# Patient Record
Sex: FEMALE | Race: WHITE | NOT HISPANIC OR LATINO | Employment: FULL TIME | URBAN - METROPOLITAN AREA
[De-identification: names, ages, dates, MRNs, and addresses within clinical notes are randomized per-mention and may not be internally consistent; named-entity substitution may affect disease eponyms.]

---

## 2017-01-11 ENCOUNTER — OFFICE VISIT (OUTPATIENT)
Dept: NEUROSURGERY | Facility: CLINIC | Age: 52
End: 2017-01-11

## 2017-01-11 VITALS
SYSTOLIC BLOOD PRESSURE: 102 MMHG | TEMPERATURE: 96.7 F | WEIGHT: 169 LBS | HEIGHT: 64 IN | BODY MASS INDEX: 28.85 KG/M2 | DIASTOLIC BLOOD PRESSURE: 78 MMHG

## 2017-01-11 DIAGNOSIS — M51.26 LUMBAR DISC HERNIATION: Primary | ICD-10-CM

## 2017-01-11 PROCEDURE — 99024 POSTOP FOLLOW-UP VISIT: CPT | Performed by: PHYSICIAN ASSISTANT

## 2017-01-11 NOTE — PROGRESS NOTES
Patient: Roxanna Beck  : 1965  Chart #: [id number]    Date of Service: 2017    CHIEF COMPLAINT: left L3 4 disc herniation as well as recess and foraminal stenosis    History of Present Illness Ms. Beck is a 51-year-old  with a protracted history of back and left lower extremity pain that has waxed and waned but more recently became severe and unremitting.  Her MRI revealed a disc extrusion at L3 4 leftward felt to provide clinical correlation.  As such, on 12/15/2016 she underwent lumbar decompressive surgery and discectomy at this level.  Surgery was without overt intraoperative complication.    Today Ms. Beck is 3 1/2 weeks postop.  She continues to have a bit of pain in her thigh and groin area on the left, specifically with sitting.  During the first 2 postoperative weeks she called our office several times complaining of severe, unrelenting leg pain.  She was given a Medrol Dosepak and her symptoms gradually improved.  She is feeling much better now.  She uses her postoperative narcotic-based pain medication on occasion but mostly is using ibuprofen and Tylenol.  She has been trying to increase her activities a bit and is feeling in general quite tired.    The following portions of the patient's history were reviewed and updated as appropriate: allergies, current medications, past family history, past medical history, past social history, past surgical history and problem list.    Review of Systems   Constitutional: Positive for appetite change, chills and diaphoresis. Negative for activity change, fatigue, fever and unexpected weight change.   HENT: Negative for congestion, dental problem, drooling, ear discharge, ear pain, facial swelling, hearing loss, mouth sores, nosebleeds, postnasal drip, rhinorrhea, sinus pressure, sneezing, sore throat, tinnitus, trouble swallowing and voice change.    Eyes: Negative for photophobia, pain, discharge, redness, itching and visual  "disturbance.   Respiratory: Negative for apnea, cough, choking, chest tightness, shortness of breath, wheezing and stridor.    Cardiovascular: Negative for chest pain, palpitations and leg swelling.   Gastrointestinal: Negative for abdominal distention, abdominal pain, anal bleeding, blood in stool, constipation, diarrhea, nausea, rectal pain and vomiting.   Endocrine: Positive for heat intolerance. Negative for cold intolerance, polydipsia, polyphagia and polyuria.   Genitourinary: Negative for decreased urine volume, difficulty urinating, dysuria, enuresis, flank pain, frequency, genital sores, hematuria and urgency.   Musculoskeletal: Positive for arthralgias, back pain and myalgias. Negative for gait problem, joint swelling, neck pain and neck stiffness.   Skin: Negative for color change, pallor, rash and wound.   Allergic/Immunologic: Positive for environmental allergies. Negative for food allergies and immunocompromised state.   Neurological: Positive for light-headedness. Negative for dizziness, tremors, seizures, syncope, facial asymmetry, speech difficulty, weakness, numbness and headaches.   Hematological: Negative for adenopathy. Does not bruise/bleed easily.   Psychiatric/Behavioral: Negative for agitation, behavioral problems, confusion, decreased concentration, dysphoric mood, hallucinations, self-injury, sleep disturbance and suicidal ideas. The patient is not nervous/anxious and is not hyperactive.        Objective   Vital Signs: Blood pressure 102/78, temperature 96.7 °F (35.9 °C), height 64\" (162.6 cm), weight 169 lb (76.7 kg).  Physical Exam   Constitutional: She appears well-developed and well-nourished. No distress.   Skin:   Lumbar incision has healed nicely.  She has some erythematous patches about the incision consistent with adhesive dermatitis.   Nursing note and vitals reviewed.    Assessment/Plan   Medical Decision Making: Ms. Beck is 3-1/2 weeks status post an uncomplicated " discectomy.  Her first couple weeks were a bit rough but it seems that she's made it over the hump.  Her pain is much better and she is now working on increasing her stamina.  I instructed her to listen to her body and not overdo it. Her place of work has adjusted her workstation so that she may stand or sit.  She will be off a couple more weeks to work on increasing her activities and then may return.  We will check back with her in 6 weeks.               Kya Garcia PA-C  Patient Care Team:  Donato Vilchis DO as PCP - General (Family Medicine)  Donato Vilchis DO as Consulting Physician (Family Medicine)  Jefferson Monk MD as Consulting Physician (Neurosurgery)  GUI Omalley as Referring Physician (Family Medicine)  Donato Vilchis DO as Consulting Physician (Family Medicine)

## 2017-03-17 ENCOUNTER — OFFICE VISIT (OUTPATIENT)
Dept: NEUROSURGERY | Facility: CLINIC | Age: 52
End: 2017-03-17

## 2017-03-17 VITALS — BODY MASS INDEX: 30.22 KG/M2 | WEIGHT: 177 LBS | HEIGHT: 64 IN | TEMPERATURE: 96.3 F

## 2017-03-17 DIAGNOSIS — M48.061 STENOSIS OF LATERAL RECESS OF LUMBAR SPINE: ICD-10-CM

## 2017-03-17 DIAGNOSIS — M51.26 LUMBAR DISC HERNIATION: Primary | ICD-10-CM

## 2017-03-17 PROCEDURE — 99212 OFFICE O/P EST SF 10 MIN: CPT | Performed by: NEUROLOGICAL SURGERY

## 2017-03-17 NOTE — PROGRESS NOTES
Patient: Roxanna Beck  : 1965    Primary Care Provider: Donato Vilchis DO    Requesting Provider: As above        History    Chief Complaint: Left L3-4 disc herniation as well as recess and foraminal stenosis.    History of Present Illness: Ms. Beck is a 51-year-old  who is seen in follow-up.  She's had prior lumbar surgery by another surgeon.  She developed severe back and left lower extremity symptoms that waxed, waned, and then became unremitting.  On 12/15/2016 she underwent lumbar decompression and discectomy on the left at L3-4.  Her initial first 2 weeks were quite rough.  However thereafter her symptoms dissipated and she's had no significant pain whatsoever.  She's been very active.  She has been walking actively and has even lost some weight.    Review of Systems   Constitutional: Positive for fatigue. Negative for activity change, appetite change, chills, diaphoresis, fever and unexpected weight change.   HENT: Positive for rhinorrhea, sinus pressure and sneezing. Negative for congestion, dental problem, drooling, ear discharge, ear pain, facial swelling, hearing loss, mouth sores, nosebleeds, postnasal drip, sore throat, tinnitus, trouble swallowing and voice change.    Eyes: Negative for photophobia, pain, discharge, redness, itching and visual disturbance.   Respiratory: Negative for apnea, cough, choking, chest tightness, shortness of breath, wheezing and stridor.    Cardiovascular: Negative for chest pain, palpitations and leg swelling.   Gastrointestinal: Negative for abdominal distention, abdominal pain, anal bleeding, blood in stool, constipation, diarrhea, nausea, rectal pain and vomiting.   Endocrine: Positive for heat intolerance and polydipsia.   Genitourinary: Positive for urgency.   Musculoskeletal: Positive for arthralgias, myalgias and neck stiffness. Negative for back pain, gait problem, joint swelling and neck pain.   Skin: Negative for color change, pallor,  "rash and wound.   Allergic/Immunologic: Positive for environmental allergies. Negative for food allergies and immunocompromised state.   Neurological: Positive for headaches. Negative for dizziness, tremors, seizures, syncope, facial asymmetry, speech difficulty, weakness, light-headedness and numbness.   Hematological: Negative for adenopathy. Does not bruise/bleed easily.   Psychiatric/Behavioral: Positive for dysphoric mood and sleep disturbance. Negative for agitation, behavioral problems, confusion, decreased concentration, self-injury and suicidal ideas. The patient is nervous/anxious. The patient is not hyperactive.        The patient's past medical history, past surgical history, family history, and social history have been reviewed at length in the electronic medical record.    Physical Exam:   Visit Vitals   • Temp 96.3 °F (35.7 °C)   • Ht 64\" (162.6 cm)   • Wt 177 lb (80.3 kg)   • BMI 30.38 kg/m2     MUSCULOSKELETAL:  Straight leg raising is negative.  Keenan's Sign is negative.  ROM in back normal.  Tenderness in the back to palpation is not observed.  NEUROLOGICAL:  Strength is intact in the lower extremities to direct testing.  Muscle tone is normal throughout.  Station and gait are normal.  Sensation is intact to light touch testing throughout except in the right leg where she has some residual numbness from her first surgery.  Deep tendon reflexes are 1+ and symmetrical.  Coordination is intact.      Medical Decision Making    Diagnosis:   Left L3-4 disc herniation and stenosis status post decompression.    Treatment Options:   Ms. Beck is doing great.  I'm very pleased with her progress.  At this juncture she will follow-up with me on an as-needed basis.       Diagnosis Plan   1. Lumbar disc herniation     2. Stenosis of lateral recess of lumbar spine             I, Dr. Cuenca, personally performed the services described in the documentation, as scribed in my presence, and it is both accurate " and complete.  Scribed for Sandro Cuenca MD by Dean Borges CMA on 03/17/2017 at 9:41 AM

## 2018-07-06 ENCOUNTER — APPOINTMENT (OUTPATIENT)
Dept: GENERAL RADIOLOGY | Facility: HOSPITAL | Age: 53
End: 2018-07-06

## 2018-07-06 ENCOUNTER — HOSPITAL ENCOUNTER (EMERGENCY)
Facility: HOSPITAL | Age: 53
Discharge: HOME | End: 2018-07-06
Attending: EMERGENCY MEDICINE | Admitting: EMERGENCY MEDICINE

## 2018-07-06 VITALS
OXYGEN SATURATION: 97 % | BODY MASS INDEX: 29.19 KG/M2 | WEIGHT: 171 LBS | RESPIRATION RATE: 16 BRPM | TEMPERATURE: 98.2 F | SYSTOLIC BLOOD PRESSURE: 114 MMHG | HEIGHT: 64 IN | DIASTOLIC BLOOD PRESSURE: 95 MMHG | HEART RATE: 87 BPM

## 2018-07-06 DIAGNOSIS — M54.12 CERVICAL RADICULOPATHY: Primary | ICD-10-CM

## 2018-07-06 DIAGNOSIS — M77.8 LEFT SHOULDER TENDONITIS: ICD-10-CM

## 2018-07-06 PROCEDURE — 99283 EMERGENCY DEPT VISIT LOW MDM: CPT

## 2018-07-06 PROCEDURE — 72050 X-RAY EXAM NECK SPINE 4/5VWS: CPT

## 2018-07-06 PROCEDURE — 73030 X-RAY EXAM OF SHOULDER: CPT

## 2018-07-06 RX ORDER — METHYLPREDNISOLONE 4 MG/1
TABLET ORAL
Qty: 21 EACH | Refills: 0 | OUTPATIENT
Start: 2018-07-06 | End: 2021-12-24

## 2018-07-06 RX ORDER — GLIPIZIDE 10 MG/1
10 TABLET ORAL
COMMUNITY

## 2018-07-06 RX ORDER — CEPHALEXIN 500 MG/1
500 CAPSULE ORAL 2 TIMES DAILY
Qty: 14 CAPSULE | Refills: 0 | Status: SHIPPED | OUTPATIENT
Start: 2018-07-06 | End: 2018-07-06

## 2018-07-06 RX ORDER — FUROSEMIDE 20 MG/1
20 TABLET ORAL DAILY
Qty: 7 TABLET | Refills: 0 | Status: SHIPPED | OUTPATIENT
Start: 2018-07-06 | End: 2018-07-06

## 2018-07-06 RX ORDER — METHYLPREDNISOLONE 4 MG/1
TABLET ORAL
Qty: 21 EACH | Refills: 0 | Status: SHIPPED | OUTPATIENT
Start: 2018-07-06 | End: 2018-07-06

## 2018-07-06 RX ORDER — CYCLOBENZAPRINE HCL 5 MG
5 TABLET ORAL 3 TIMES DAILY PRN
Qty: 15 TABLET | Refills: 0 | Status: ON HOLD | OUTPATIENT
Start: 2018-07-06 | End: 2021-12-29

## 2018-07-06 NOTE — ED PROVIDER NOTES
EMERGENCY DEPARTMENT ENCOUNTER    CHIEF COMPLAINT  Chief Complaint: Left shoulder pain  History given by: pt  History limited by: none  Room Number:   PMD: Donato Vilchis DO      HPI:  Pt is a 52 y.o. female who presents complaining of left shoulder pain that started 3 weeks ago that radiates down her left arm. Pt reports shes had bursitis before but has never had this much pain. Pt says the lateral side of her left hand is numb. Pt denies a history of neck problems. Pt reports she has moved recently but didn't lift anything too heavy.     Duration:  3 weeks  Onset: gradual  Timing: constant  Location: left shoulder  Radiation: down left arm  Quality: pain  Intensity/Severity: moderate  Progression: unchanged  Associated Symptoms: numbness in left hand  Aggravating Factors: none  Alleviating Factors: none  Previous Episodes: History of bursitis but nothing this painful  Treatment before arrival: none    PAST MEDICAL HISTORY  Active Ambulatory Problems     Diagnosis Date Noted   • Lumbar disc herniation 12/15/2016     Resolved Ambulatory Problems     Diagnosis Date Noted   • No Resolved Ambulatory Problems     Past Medical History:   Diagnosis Date   • Arthritis    • Depression    • Diabetes mellitus (CMS/HCC)    • Dizzy    • Fibromyalgia    • GERD (gastroesophageal reflux disease)    • History of kidney stones    • HNP (herniated nucleus pulposus)    • Hyperlipidemia    • Hypertension    • Wears glasses        PAST SURGICAL HISTORY  Past Surgical History:   Procedure Laterality Date   • BACK SURGERY      L4-5 Discectomy   • CARPAL TUNNEL RELEASE      left   •  SECTION     • CHOLECYSTECTOMY     • COLONOSCOPY     • ENDOSCOPY     • HYSTERECTOMY     • KIDNEY STONE SURGERY     • LUMBAR DISCECTOMY N/A 12/15/2016    Procedure: LUMBAR DISCECTOMY L3-4;  Surgeon: Sandro Cuenca MD;  [facility];  Service:    • OOPHORECTOMY     • TONSILLECTOMY     • WISDOM TOOTH EXTRACTION         FAMILY  HISTORY  Family History   Problem Relation Age of Onset   • Lung cancer Mother    • Cancer Mother    • Heart attack Father    • Heart disease Father    • Diabetes Sister    • Hypertension Sister    • Diabetes Brother    • Hypertension Brother    • Heart disease Paternal Grandmother    • Heart disease Paternal Grandfather        SOCIAL HISTORY  Social History     Social History   • Marital status:      Spouse name: N/A   • Number of children: 1   • Years of education: High School     Occupational History   • [employer]      Social History Main Topics   • Smoking status: Never Smoker   • Smokeless tobacco: Never Used   • Alcohol use No   • Drug use: No   • Sexual activity: Defer     Other Topics Concern   • Not on file     Social History Narrative   • No narrative on file       ALLERGIES  Adhesive tape; Codeine; Percocet [oxycodone-acetaminophen]; and Toradol [ketorolac tromethamine]    REVIEW OF SYSTEMS  Review of Systems   Constitutional: Negative for fever.   HENT: Negative for sore throat.    Eyes: Negative.    Respiratory: Negative for cough and shortness of breath.    Cardiovascular: Negative for chest pain.   Gastrointestinal: Negative for abdominal pain, diarrhea and vomiting.   Genitourinary: Negative for dysuria.   Musculoskeletal: Positive for arthralgias (Left shoulder). Negative for neck pain.   Skin: Negative for rash.   Allergic/Immunologic: Negative.    Neurological: Positive for numbness (Left lateral hand). Negative for weakness and headaches.   Hematological: Negative.    Psychiatric/Behavioral: Negative.    All other systems reviewed and are negative.      PHYSICAL EXAM  ED Triage Vitals [07/06/18 1925]   Temp Heart Rate Resp BP SpO2   98.2 °F (36.8 °C) 99 18 -- 99 %      Temp src Heart Rate Source Patient Position BP Location FiO2 (%)   Tympanic Monitor -- -- --       Physical Exam   Constitutional: She is oriented to person, place, and time. No distress.   HENT:   Head: Normocephalic and  atraumatic.   Eyes: EOM are normal. Pupils are equal, round, and reactive to light.   Neck: Normal range of motion. Neck supple.   Cardiovascular: Normal rate, regular rhythm and normal heart sounds.    Pulmonary/Chest: Effort normal and breath sounds normal. No respiratory distress.   Abdominal: Soft. There is no tenderness. There is no rebound and no guarding.   Musculoskeletal: Normal range of motion. She exhibits tenderness (Left trapezius and left shoulder). She exhibits no edema (pedal).    Numbness to lateral side of left hand but is neurovascularly intact. ROM of shoulder causes pain.    Neurological: She is alert and oriented to person, place, and time. She has normal sensation and normal strength.   Skin: Skin is warm and dry. No rash noted.   Psychiatric: Mood and affect normal.   Nursing note and vitals reviewed.        RADIOLOGY  XR Spine Cervical Complete 4 or 5 View       XR Shoulder 2+ View Left        Show some DJD of C spine and some calcification on left humerous that could be consistent with tendonitis          I ordered the above noted radiological studies. Interpreted by radiologist. Discussed with radiologist (Dr. Miranda). Reviewed by me in PACS.       PROCEDURES  Procedures      PROGRESS AND CONSULTS     1936  Ordered Spine XR and Shoulder XR for further evaluation.     2016  Rechecked patient who is resting comfortably. Discussed all lab and test results. Discussed plan to discharge with muscle relaxers, steroids, and options to see pain management or an orthopedic doctor. Pt understands and agrees with the plan. All questions have been answered.        MEDICAL DECISION MAKING  Results were reviewed/discussed with the patient and they were also made aware of online access. Pt also made aware that some labs, such as cultures, will not be resulted during ER visit and follow up with PMD is necessary.     MDM  Number of Diagnoses or Management Options     Amount and/or Complexity of Data  Reviewed  Tests in the radiology section of CPT®: ordered and reviewed (Cervical and Shoulder XR - show some DJD of C spine and some calcification on left humerous that could be consistent with tendonitis)  Discussion of test results with the performing providers: yes (Dr. Miranda)           DIAGNOSIS  Final diagnoses:   Cervical radiculopathy   Left shoulder tendonitis       DISPOSITION  DISCHARGE    Patient discharged in stable condition.    Reviewed implications of results, diagnosis, meds, responsibility to follow up, warning signs and symptoms of possible worsening, potential complications and reasons to return to ER.    Patient/Family voiced understanding of above instructions.    Discussed plan for discharge, as there is no emergent indication for admission. Patient referred to primary care provider for BP management due to today's BP. Pt/family is agreeable and understands need for follow up and repeat testing.  Pt is aware that discharge does not mean that nothing is wrong but it indicates no emergency is present that requires admission and they must continue care with follow-up as given below or physician of their choice.     FOLLOW-UP  Donato Vilchis, DO  [address] [address]  [phone]    In 1 week  For recheck         Medication List      New Prescriptions    cyclobenzaprine 5 MG tablet  Commonly known as:  FLEXERIL  Take 1 tablet by mouth 3 (Three) Times a Day As Needed for Muscle Spasms.     MethylPREDNISolone 4 MG tablet  Commonly known as:  MEDROL (JOHN)  Take as directed on package instructions.              Latest Documented Vital Signs:  As of 8:29 PM  BP- 132/83 HR- 89 Temp- 98.2 °F (36.8 °C) (Tympanic) O2 sat- 98%    --  Documentation assistance provided by florence Badillo for Dr. Givens.  Information recorded by the scribe was done at my direction and has been verified and validated by me.     Lynda Badillo  07/06/18 2021       Heraclio Givens,  MD  07/06/18 2029

## 2018-07-06 NOTE — ED TRIAGE NOTES
Pt reports she has left shoulder and arm pain that has been going on for a few weeks.  Pt states when she moves her arm the pain increases.  Pt has tried motrin and a muscle rub but reports the pain has increased. Pt also reports chest pain.

## 2018-11-08 ENCOUNTER — TRANSCRIBE ORDERS (OUTPATIENT)
Dept: ADMINISTRATIVE | Facility: HOSPITAL | Age: 53
End: 2018-11-08

## 2018-11-12 ENCOUNTER — TRANSCRIBE ORDERS (OUTPATIENT)
Dept: ADMINISTRATIVE | Facility: HOSPITAL | Age: 53
End: 2018-11-12

## 2018-11-12 DIAGNOSIS — N63.23 BREAST LUMP ON LEFT SIDE AT 5 O'CLOCK POSITION: Primary | ICD-10-CM

## 2018-11-28 ENCOUNTER — HOSPITAL ENCOUNTER (OUTPATIENT)
Dept: MAMMOGRAPHY | Facility: HOSPITAL | Age: 53
Discharge: HOME | End: 2018-11-28
Admitting: PHYSICIAN ASSISTANT

## 2018-11-28 ENCOUNTER — HOSPITAL ENCOUNTER (OUTPATIENT)
Dept: ULTRASOUND IMAGING | Facility: HOSPITAL | Age: 53
Discharge: HOME | End: 2018-11-28

## 2018-11-28 DIAGNOSIS — N63.23 BREAST LUMP ON LEFT SIDE AT 5 O'CLOCK POSITION: ICD-10-CM

## 2018-11-28 PROCEDURE — 76642 ULTRASOUND BREAST LIMITED: CPT | Performed by: RADIOLOGY

## 2018-11-28 PROCEDURE — 77062 BREAST TOMOSYNTHESIS BI: CPT | Performed by: RADIOLOGY

## 2018-11-28 PROCEDURE — 77066 DX MAMMO INCL CAD BI: CPT | Performed by: RADIOLOGY

## 2018-11-28 PROCEDURE — 76642 ULTRASOUND BREAST LIMITED: CPT

## 2018-11-28 PROCEDURE — G0279 TOMOSYNTHESIS, MAMMO: HCPCS

## 2018-11-28 PROCEDURE — 77066 DX MAMMO INCL CAD BI: CPT

## 2020-01-01 NOTE — MR AVS SNAPSHOT
Roxanna Beck   1/11/2017 11:00 AM   Office Visit    Dept Phone:  [phone]   Encounter #:  [id number]    Provider:  Kya Garcia PA-C   Department:  [facility]                Your Full Care Plan              Today's Medication Changes          These changes are accurate as of: 1/11/17 11:33 AM.  If you have any questions, ask your nurse or doctor.               Medication(s)that have changed:     * HYDROcodone-acetaminophen  MG per tablet   Commonly known as:  NORCO   Take 1 tablet by mouth 3 (Three) Times a Day As Needed for moderate pain (4-6).   What changed:  additional instructions   Changed by:  Sandro Cuenca MD       * HYDROcodone-acetaminophen  MG per tablet   Commonly known as:  NORCO   Take 1 tablet by mouth 3 (Three) Times a Day As Needed for moderate pain (4-6).   What changed:  Another medication with the same name was changed. Make sure you understand how and when to take each.   Changed by:  Dean Borges       * Notice:  This list has 2 medication(s) that are the same as other medications prescribed for you. Read the directions carefully, and ask your doctor or other care provider to review them with you.               Your Updated Medication List          This list is accurate as of: 1/11/17 11:33 AM.  Always use your most recent med list.                amitriptyline 25 MG tablet   Commonly known as:  ELAVIL       cetirizine 10 MG tablet   Commonly known as:  zyrTEC       DULoxetine 30 MG capsule   Commonly known as:  CYMBALTA       gabapentin 600 MG tablet   Commonly known as:  NEURONTIN       * HYDROcodone-acetaminophen  MG per tablet   Commonly known as:  NORCO   Take 1 tablet by mouth 3 (Three) Times a Day As Needed for moderate pain (4-6).       * HYDROcodone-acetaminophen  MG per tablet   Commonly known as:  NORCO   Take 1 tablet by mouth 3 (Three) Times a Day As Needed for moderate pain  (4-6).       insulin NPH-insulin regular (70-30) 100 UNIT/ML injection   Commonly known as:  novoLIN 70/30       INVOKANA PO       lisinopril 10 MG tablet   Commonly known as:  PRINIVIL,ZESTRIL       METFORMIN HCL PO       omeprazole 40 MG capsule   Commonly known as:  priLOSEC       rosuvastatin 10 MG tablet   Commonly known as:  CRESTOR       * Notice:  This list has 2 medication(s) that are the same as other medications prescribed for you. Read the directions carefully, and ask your doctor or other care provider to review them with you.            You Were Diagnosed With        Codes Comments    Lumbar disc herniation    -  Primary ICD-10-CM: M51.26  ICD-9-CM: 722.10       Instructions     None    Patient Instructions History      Upcoming Appointments     Visit Type Date Time Department    POST-OP 2017 11:00 AM [facility]    OFFICE VISIT 2017  1:45 PM [facility]      LineHop Signup     [facility] [employer] allows you to send messages to your doctor, view your test results, renew your prescriptions, schedule appointments, and more. To sign up, go to [employer] and click on the Sign Up Now link in the New User? box. Enter your LineHop Activation Code exactly as it appears below along with the last four digits of your Social Security Number and your Date of Birth () to complete the sign-up process. If you do not sign up before the expiration date, you must request a new code.    LineHop Activation Code: [email or web address]  Expires: 2017 11:33 AM    If you have questions, you can email [email or web address] or call [phone] to talk to our Line[employer] staff. Remember, LineHop is NOT to be used for urgent needs. For medical emergencies, dial 911.               Other Info from Your Visit           Your Appointments     2017  1:45 PM EST   (Arrive by 1:30 PM EST)   Office Visit with Sandro Cuenca MD   [facility] (--)    [address],  [facility] [address]  [facility] [address] [phone]           Arrive 15 minutes prior to appointment.              Allergies     Adhesive Tape      Codeine Intolerance Nausea And Vomiting    Percocet [Oxycodone-acetaminophen] Intolerance Nausea And Vomiting    Caused bad pain    Toradol [Ketorolac Tromethamine] Intolerance Other (See Comments)    \"doesnt do anything\"       Reason for Visit     Post-op           Vital Signs     Blood Pressure Temperature Height Weight Body Mass Index Smoking Status    102/78 96.7 °F (35.9 °C) 64\" (162.6 cm) 169 lb (76.7 kg) 29.01 kg/m2 Never Smoker      Problems and Diagnoses Noted     Lumbar disc herniation        " 0 -5.05

## 2020-07-16 ENCOUNTER — TELEPHONE (OUTPATIENT)
Dept: URGENT CARE | Facility: CLINIC | Age: 55
End: 2020-07-16

## 2021-12-24 ENCOUNTER — APPOINTMENT (OUTPATIENT)
Dept: GENERAL RADIOLOGY | Facility: HOSPITAL | Age: 56
End: 2021-12-24

## 2021-12-24 ENCOUNTER — HOSPITAL ENCOUNTER (EMERGENCY)
Facility: HOSPITAL | Age: 56
Discharge: HOME | End: 2021-12-24
Attending: EMERGENCY MEDICINE | Admitting: EMERGENCY MEDICINE

## 2021-12-24 VITALS
DIASTOLIC BLOOD PRESSURE: 92 MMHG | HEART RATE: 91 BPM | OXYGEN SATURATION: 98 % | SYSTOLIC BLOOD PRESSURE: 163 MMHG | TEMPERATURE: 97.2 F | RESPIRATION RATE: 18 BRPM

## 2021-12-24 DIAGNOSIS — E86.0 DEHYDRATION: ICD-10-CM

## 2021-12-24 DIAGNOSIS — B34.8 RHINOVIRUS INFECTION: Primary | ICD-10-CM

## 2021-12-24 DIAGNOSIS — R53.83 EXHAUSTION: ICD-10-CM

## 2021-12-24 DIAGNOSIS — G44.89 OTHER HEADACHE SYNDROME: ICD-10-CM

## 2021-12-24 LAB
ALBUMIN SERPL-MCNC: 4.5 G/DL (ref 3.5–5.2)
ALBUMIN/GLOB SERPL: 1.3 G/DL
ALP SERPL-CCNC: 141 U/L (ref 39–117)
ALT SERPL W P-5'-P-CCNC: 54 U/L (ref 1–33)
ANION GAP SERPL CALCULATED.3IONS-SCNC: 15.2 MMOL/L (ref 5–15)
AST SERPL-CCNC: 46 U/L (ref 1–32)
B PARAPERT DNA SPEC QL NAA+PROBE: NOT DETECTED
B PERT DNA SPEC QL NAA+PROBE: NOT DETECTED
BACTERIA UR QL AUTO: ABNORMAL /HPF
BASOPHILS # BLD AUTO: 0.03 10*3/MM3 (ref 0–0.2)
BASOPHILS NFR BLD AUTO: 0.4 % (ref 0–1.5)
BILIRUB SERPL-MCNC: 0.3 MG/DL (ref 0–1.2)
BILIRUB UR QL STRIP: NEGATIVE
BUN SERPL-MCNC: 21 MG/DL (ref 6–20)
BUN/CREAT SERPL: 23.6 (ref 7–25)
C PNEUM DNA NPH QL NAA+NON-PROBE: NOT DETECTED
CALCIUM SPEC-SCNC: 9.9 MG/DL (ref 8.6–10.5)
CHLORIDE SERPL-SCNC: 99 MMOL/L (ref 98–107)
CLARITY UR: CLEAR
CO2 SERPL-SCNC: 20.8 MMOL/L (ref 22–29)
COLOR UR: YELLOW
CREAT SERPL-MCNC: 0.89 MG/DL (ref 0.57–1)
CRP SERPL-MCNC: 0.48 MG/DL (ref 0–0.5)
D-LACTATE SERPL-SCNC: 1.7 MMOL/L (ref 0.5–2)
D-LACTATE SERPL-SCNC: 2.6 MMOL/L (ref 0.5–2)
DEPRECATED RDW RBC AUTO: 39.9 FL (ref 37–54)
EOSINOPHIL # BLD AUTO: 0.09 10*3/MM3 (ref 0–0.4)
EOSINOPHIL NFR BLD AUTO: 1.1 % (ref 0.3–6.2)
ERYTHROCYTE [DISTWIDTH] IN BLOOD BY AUTOMATED COUNT: 12.2 % (ref 12.3–15.4)
FLUAV SUBTYP SPEC NAA+PROBE: NOT DETECTED
FLUBV RNA ISLT QL NAA+PROBE: NOT DETECTED
GFR SERPL CREATININE-BSD FRML MDRD: 65 ML/MIN/1.73
GLOBULIN UR ELPH-MCNC: 3.6 GM/DL
GLUCOSE SERPL-MCNC: 213 MG/DL (ref 65–99)
GLUCOSE UR STRIP-MCNC: ABNORMAL MG/DL
HADV DNA SPEC NAA+PROBE: NOT DETECTED
HCOV 229E RNA SPEC QL NAA+PROBE: NOT DETECTED
HCOV HKU1 RNA SPEC QL NAA+PROBE: NOT DETECTED
HCOV NL63 RNA SPEC QL NAA+PROBE: NOT DETECTED
HCOV OC43 RNA SPEC QL NAA+PROBE: NOT DETECTED
HCT VFR BLD AUTO: 43.9 % (ref 34–46.6)
HGB BLD-MCNC: 15 G/DL (ref 12–15.9)
HGB UR QL STRIP.AUTO: NEGATIVE
HMPV RNA NPH QL NAA+NON-PROBE: NOT DETECTED
HPIV1 RNA ISLT QL NAA+PROBE: NOT DETECTED
HPIV2 RNA SPEC QL NAA+PROBE: NOT DETECTED
HPIV3 RNA NPH QL NAA+PROBE: NOT DETECTED
HPIV4 P GENE NPH QL NAA+PROBE: NOT DETECTED
HYALINE CASTS UR QL AUTO: ABNORMAL /LPF
IMM GRANULOCYTES # BLD AUTO: 0.04 10*3/MM3 (ref 0–0.05)
IMM GRANULOCYTES NFR BLD AUTO: 0.5 % (ref 0–0.5)
KETONES UR QL STRIP: ABNORMAL
LEUKOCYTE ESTERASE UR QL STRIP.AUTO: ABNORMAL
LYMPHOCYTES # BLD AUTO: 2.76 10*3/MM3 (ref 0.7–3.1)
LYMPHOCYTES NFR BLD AUTO: 35 % (ref 19.6–45.3)
M PNEUMO IGG SER IA-ACNC: NOT DETECTED
MCH RBC QN AUTO: 31 PG (ref 26.6–33)
MCHC RBC AUTO-ENTMCNC: 34.2 G/DL (ref 31.5–35.7)
MCV RBC AUTO: 90.7 FL (ref 79–97)
MONOCYTES # BLD AUTO: 0.7 10*3/MM3 (ref 0.1–0.9)
MONOCYTES NFR BLD AUTO: 8.9 % (ref 5–12)
NEUTROPHILS NFR BLD AUTO: 4.26 10*3/MM3 (ref 1.7–7)
NEUTROPHILS NFR BLD AUTO: 54.1 % (ref 42.7–76)
NITRITE UR QL STRIP: NEGATIVE
NRBC BLD AUTO-RTO: 0 /100 WBC (ref 0–0.2)
NT-PROBNP SERPL-MCNC: 31.8 PG/ML (ref 0–900)
PH UR STRIP.AUTO: <=5 [PH] (ref 5–8)
PLATELET # BLD AUTO: 279 10*3/MM3 (ref 140–450)
PMV BLD AUTO: 9.6 FL (ref 6–12)
POTASSIUM SERPL-SCNC: 5.4 MMOL/L (ref 3.5–5.2)
PROCALCITONIN SERPL-MCNC: 0.1 NG/ML (ref 0–0.25)
PROT SERPL-MCNC: 8.1 G/DL (ref 6–8.5)
PROT UR QL STRIP: ABNORMAL
RBC # BLD AUTO: 4.84 10*6/MM3 (ref 3.77–5.28)
RBC # UR STRIP: ABNORMAL /HPF
REF LAB TEST METHOD: ABNORMAL
RHINOVIRUS RNA SPEC NAA+PROBE: DETECTED
RSV RNA NPH QL NAA+NON-PROBE: NOT DETECTED
SARS-COV-2 RNA NPH QL NAA+NON-PROBE: NOT DETECTED
SODIUM SERPL-SCNC: 135 MMOL/L (ref 136–145)
SP GR UR STRIP: 1.02 (ref 1–1.03)
SQUAMOUS #/AREA URNS HPF: ABNORMAL /HPF
TROPONIN T SERPL-MCNC: <0.01 NG/ML (ref 0–0.03)
UROBILINOGEN UR QL STRIP: ABNORMAL
WBC # UR STRIP: ABNORMAL /HPF
WBC NRBC COR # BLD: 7.88 10*3/MM3 (ref 3.4–10.8)

## 2021-12-24 PROCEDURE — 84145 PROCALCITONIN (PCT): CPT | Performed by: EMERGENCY MEDICINE

## 2021-12-24 PROCEDURE — 93005 ELECTROCARDIOGRAM TRACING: CPT | Performed by: EMERGENCY MEDICINE

## 2021-12-24 PROCEDURE — 80053 COMPREHEN METABOLIC PANEL: CPT | Performed by: EMERGENCY MEDICINE

## 2021-12-24 PROCEDURE — 87040 BLOOD CULTURE FOR BACTERIA: CPT | Performed by: EMERGENCY MEDICINE

## 2021-12-24 PROCEDURE — 86140 C-REACTIVE PROTEIN: CPT | Performed by: EMERGENCY MEDICINE

## 2021-12-24 PROCEDURE — 96375 TX/PRO/DX INJ NEW DRUG ADDON: CPT

## 2021-12-24 PROCEDURE — 83605 ASSAY OF LACTIC ACID: CPT | Performed by: EMERGENCY MEDICINE

## 2021-12-24 PROCEDURE — 71045 X-RAY EXAM CHEST 1 VIEW: CPT

## 2021-12-24 PROCEDURE — 36415 COLL VENOUS BLD VENIPUNCTURE: CPT

## 2021-12-24 PROCEDURE — 85025 COMPLETE CBC W/AUTO DIFF WBC: CPT | Performed by: EMERGENCY MEDICINE

## 2021-12-24 PROCEDURE — 83880 ASSAY OF NATRIURETIC PEPTIDE: CPT | Performed by: EMERGENCY MEDICINE

## 2021-12-24 PROCEDURE — 25010000002 DIPHENHYDRAMINE PER 50 MG: Performed by: EMERGENCY MEDICINE

## 2021-12-24 PROCEDURE — 0202U NFCT DS 22 TRGT SARS-COV-2: CPT | Performed by: EMERGENCY MEDICINE

## 2021-12-24 PROCEDURE — 93010 ELECTROCARDIOGRAM REPORT: CPT | Performed by: INTERNAL MEDICINE

## 2021-12-24 PROCEDURE — 84484 ASSAY OF TROPONIN QUANT: CPT | Performed by: EMERGENCY MEDICINE

## 2021-12-24 PROCEDURE — 25010000002 PROCHLORPERAZINE 10 MG/2ML SOLUTION: Performed by: EMERGENCY MEDICINE

## 2021-12-24 PROCEDURE — 96374 THER/PROPH/DIAG INJ IV PUSH: CPT

## 2021-12-24 PROCEDURE — 81001 URINALYSIS AUTO W/SCOPE: CPT | Performed by: EMERGENCY MEDICINE

## 2021-12-24 PROCEDURE — 99284 EMERGENCY DEPT VISIT MOD MDM: CPT

## 2021-12-24 RX ORDER — SODIUM CHLORIDE 0.9 % (FLUSH) 0.9 %
10 SYRINGE (ML) INJECTION AS NEEDED
Status: DISCONTINUED | OUTPATIENT
Start: 2021-12-24 | End: 2021-12-24 | Stop reason: HOSPADM

## 2021-12-24 RX ORDER — METHYLPREDNISOLONE 4 MG/1
TABLET ORAL
Qty: 21 EACH | Refills: 0 | Status: SHIPPED | OUTPATIENT
Start: 2021-12-24 | End: 2021-12-24 | Stop reason: SDUPTHER

## 2021-12-24 RX ORDER — IBUPROFEN 600 MG/1
600 TABLET ORAL EVERY 8 HOURS PRN
Qty: 21 TABLET | Refills: 0 | Status: SHIPPED | OUTPATIENT
Start: 2021-12-24 | End: 2021-12-24 | Stop reason: SDUPTHER

## 2021-12-24 RX ORDER — PROCHLORPERAZINE EDISYLATE 5 MG/ML
5 INJECTION INTRAMUSCULAR; INTRAVENOUS ONCE
Status: COMPLETED | OUTPATIENT
Start: 2021-12-24 | End: 2021-12-24

## 2021-12-24 RX ORDER — ACETAMINOPHEN 500 MG
1000 TABLET ORAL ONCE
Status: COMPLETED | OUTPATIENT
Start: 2021-12-24 | End: 2021-12-24

## 2021-12-24 RX ORDER — IBUPROFEN 600 MG/1
600 TABLET ORAL EVERY 8 HOURS PRN
Qty: 21 TABLET | Refills: 0 | Status: SHIPPED | OUTPATIENT
Start: 2021-12-24

## 2021-12-24 RX ORDER — DIPHENHYDRAMINE HYDROCHLORIDE 50 MG/ML
25 INJECTION INTRAMUSCULAR; INTRAVENOUS ONCE
Status: COMPLETED | OUTPATIENT
Start: 2021-12-24 | End: 2021-12-24

## 2021-12-24 RX ORDER — METHYLPREDNISOLONE 4 MG/1
TABLET ORAL
Qty: 21 EACH | Refills: 0 | Status: SHIPPED | OUTPATIENT
Start: 2021-12-24

## 2021-12-24 RX ADMIN — ACETAMINOPHEN 1000 MG: 500 TABLET ORAL at 15:54

## 2021-12-24 RX ADMIN — DIPHENHYDRAMINE HYDROCHLORIDE 25 MG: 50 INJECTION, SOLUTION INTRAMUSCULAR; INTRAVENOUS at 15:55

## 2021-12-24 RX ADMIN — SODIUM CHLORIDE, POTASSIUM CHLORIDE, SODIUM LACTATE AND CALCIUM CHLORIDE 1000 ML: 600; 310; 30; 20 INJECTION, SOLUTION INTRAVENOUS at 15:37

## 2021-12-24 RX ADMIN — PROCHLORPERAZINE EDISYLATE 5 MG: 5 INJECTION INTRAMUSCULAR; INTRAVENOUS at 15:55

## 2021-12-24 NOTE — ED PROVIDER NOTES
EMERGENCY DEPARTMENT ENCOUNTER  Patient was placed in face mask in first look and the following protective measures were taken unless  documented below in the ED course. Patient was wearing facemask when I entered the room and throughout our encounter. I wore full protective equipment throughout this patient encounter including a N95 mask, eye shield, gown and gloves. Hand hygiene was performed before donning protective equipment and after removal when leaving the room.    Room Number:  30/30  Date of encounter:  12/24/2021  PCP: Donato Vilchis DO    HPI:  Context: Roxanna Beck is a 56 y.o. female who presents to the ED c/o chief complaint of several complaints.  Patient complains of sinus congestion for approximately 2 weeks.  Over the past several days, she has noted thick yellow discharge.  He works at a local  and she has worked 12 days in a row.  One of her coworkers was not feeling well for several days and she found out that her coworker tested positive for SARS-CoV-2 3 days ago.  Patient denies fever, chills or loss of sense of smell or taste.  However she complains of a posterior headache, decreased appetite and a dry cough.  She has noted lightheadedness when she stands up and walks.  She is also had dyspnea on exertion.  She states that she has been falling more when she walks over the past several days.  She is also noted diarrhea yesterday.  Is wondering if she has a sinus infection or another type of infection that is the cause of her symptoms.  Patient has been immunized against SARS-CoV-2 but has not had the booster.  Patient is a diabetic and she states her blood sugar this morning was 192.    MEDICAL HISTORY REVIEW  Reviewed in EPIC    PAST MEDICAL HISTORY  Active Ambulatory Problems     Diagnosis Date Noted   • Lumbar disc herniation 12/15/2016     Resolved Ambulatory Problems     Diagnosis Date Noted   • No Resolved Ambulatory Problems     Past Medical History:   Diagnosis Date    • Arthritis    • Depression    • Diabetes mellitus (CMS/HCC)    • Dizzy    • Fibromyalgia    • GERD (gastroesophageal reflux disease)    • History of kidney stones    • HNP (herniated nucleus pulposus)    • Hyperlipidemia    • Hypertension    • Wears glasses        PAST SURGICAL HISTORY  Past Surgical History:   Procedure Laterality Date   • BACK SURGERY      L4-5 Discectomy   • CARPAL TUNNEL RELEASE      left   •  SECTION     • CHOLECYSTECTOMY     • COLONOSCOPY     • ENDOSCOPY     • HYSTERECTOMY     • KIDNEY STONE SURGERY     • LUMBAR DISCECTOMY N/A 12/15/2016    Procedure: LUMBAR DISCECTOMY L3-4;  Surgeon: Sandro Cuenca MD;  Location: [facility];  Service:    • OOPHORECTOMY     • TONSILLECTOMY     • WISDOM TOOTH EXTRACTION         FAMILY HISTORY  Family History   Problem Relation Age of Onset   • Lung cancer Mother    • Cancer Mother    • Heart attack Father    • Heart disease Father    • Diabetes Sister    • Hypertension Sister    • Breast cancer Sister 70   • Diabetes Brother    • Hypertension Brother    • Heart disease Paternal Grandmother    • Heart disease Paternal Grandfather    • Breast cancer Cousin         early 50's   • Breast cancer Maternal Aunt         50's   • Breast cancer Maternal Aunt         50's   • Breast cancer Maternal Aunt         50's   • Breast cancer Maternal Aunt         50's   • Breast cancer Maternal Aunt         50's   • Endometrial cancer Neg Hx    • Ovarian cancer Neg Hx        SOCIAL HISTORY  Social History     Socioeconomic History   • Marital status:    • Number of children: 1   • Years of education: High School   Tobacco Use   • Smoking status: Never Smoker   • Smokeless tobacco: Never Used   Substance and Sexual Activity   • Alcohol use: No   • Drug use: No   • Sexual activity: Defer       ALLERGIES  Adhesive tape, Codeine, Percocet [oxycodone-acetaminophen], and Toradol [ketorolac tromethamine]    The patient's allergies have been reviewed    REVIEW OF  SYSTEMS  All systems reviewed and negative except for those discussed in HPI.     PHYSICAL EXAM  I have reviewed the triage vital signs and nursing notes.  ED Triage Vitals   Temp Heart Rate Resp BP SpO2   12/24/21 1249 12/24/21 1249 12/24/21 1249 12/24/21 1431 12/24/21 1249   97.2 °F (36.2 °C) 109 18 164/96 98 %      Temp src Heart Rate Source Patient Position BP Location FiO2 (%)   -- -- -- -- --                General: Mild distress  HENT: NCAT, PERRL, Nares patent.  Sinuses are nontender to palpation.  Her tympanic membranes are normal in appearance.  Eyes: no scleral icterus.  Extraocular movements are intact.  Neck: trachea midline, no ROM limitations.  No lymphadenopathy or stiff neck.  She does not have meningismus.  CV: regular rhythm, regular rate.  Respiratory: normal effort, CTAB.  Abdomen: soft, nondistended, NTTP, no rebound tenderness, no guarding or rigidity.  : deferred.  Musculoskeletal: no deformity.  No edema or calf tenderness.  Neuro: alert, moves all extremities, follows commands.  Skin: warm, dry.    LAB RESULTS  Recent Results (from the past 24 hour(s))   Comprehensive Metabolic Panel    Collection Time: 12/24/21  2:10 PM    Specimen: Blood   Result Value Ref Range    Glucose 213 (H) 65 - 99 mg/dL    BUN 21 (H) 6 - 20 mg/dL    Creatinine 0.89 0.57 - 1.00 mg/dL    Sodium 135 (L) 136 - 145 mmol/L    Potassium 5.4 (H) 3.5 - 5.2 mmol/L    Chloride 99 98 - 107 mmol/L    CO2 20.8 (L) 22.0 - 29.0 mmol/L    Calcium 9.9 8.6 - 10.5 mg/dL    Total Protein 8.1 6.0 - 8.5 g/dL    Albumin 4.50 3.50 - 5.20 g/dL    ALT (SGPT) 54 (H) 1 - 33 U/L    AST (SGOT) 46 (H) 1 - 32 U/L    Alkaline Phosphatase 141 (H) 39 - 117 U/L    Total Bilirubin 0.3 0.0 - 1.2 mg/dL    eGFR Non African Amer 65 >60 mL/min/1.73    Globulin 3.6 gm/dL    A/G Ratio 1.3 g/dL    BUN/Creatinine Ratio 23.6 7.0 - 25.0    Anion Gap 15.2 (H) 5.0 - 15.0 mmol/L   Procalcitonin    Collection Time: 12/24/21  2:10 PM    Specimen: Blood   Result  Value Ref Range    Procalcitonin 0.10 0.00 - 0.25 ng/mL   C-reactive Protein    Collection Time: 12/24/21  2:10 PM    Specimen: Blood   Result Value Ref Range    C-Reactive Protein 0.48 0.00 - 0.50 mg/dL   Lactic Acid, Plasma    Collection Time: 12/24/21  2:10 PM    Specimen: Blood   Result Value Ref Range    Lactate 2.6 (C) 0.5 - 2.0 mmol/L   Troponin    Collection Time: 12/24/21  2:10 PM    Specimen: Blood   Result Value Ref Range    Troponin T <0.010 0.000 - 0.030 ng/mL   BNP    Collection Time: 12/24/21  2:10 PM    Specimen: Blood   Result Value Ref Range    proBNP 31.8 0.0 - 900.0 pg/mL   CBC Auto Differential    Collection Time: 12/24/21  2:10 PM    Specimen: Blood   Result Value Ref Range    WBC 7.88 3.40 - 10.80 10*3/mm3    RBC 4.84 3.77 - 5.28 10*6/mm3    Hemoglobin 15.0 12.0 - 15.9 g/dL    Hematocrit 43.9 34.0 - 46.6 %    MCV 90.7 79.0 - 97.0 fL    MCH 31.0 26.6 - 33.0 pg    MCHC 34.2 31.5 - 35.7 g/dL    RDW 12.2 (L) 12.3 - 15.4 %    RDW-SD 39.9 37.0 - 54.0 fl    MPV 9.6 6.0 - 12.0 fL    Platelets 279 140 - 450 10*3/mm3    Neutrophil % 54.1 42.7 - 76.0 %    Lymphocyte % 35.0 19.6 - 45.3 %    Monocyte % 8.9 5.0 - 12.0 %    Eosinophil % 1.1 0.3 - 6.2 %    Basophil % 0.4 0.0 - 1.5 %    Immature Grans % 0.5 0.0 - 0.5 %    Neutrophils, Absolute 4.26 1.70 - 7.00 10*3/mm3    Lymphocytes, Absolute 2.76 0.70 - 3.10 10*3/mm3    Monocytes, Absolute 0.70 0.10 - 0.90 10*3/mm3    Eosinophils, Absolute 0.09 0.00 - 0.40 10*3/mm3    Basophils, Absolute 0.03 0.00 - 0.20 10*3/mm3    Immature Grans, Absolute 0.04 0.00 - 0.05 10*3/mm3    nRBC 0.0 0.0 - 0.2 /100 WBC   Respiratory Panel PCR w/COVID-19(SARS-CoV-2) [facility] In-House, NP Swab in UTM/VTM, 3-4 HR TAT - Swab, Nasopharynx    Collection Time: 12/24/21  2:11 PM    Specimen: Nasopharynx; Swab   Result Value Ref Range    ADENOVIRUS, PCR Not Detected Not Detected    Coronavirus 229E Not Detected Not Detected    Coronavirus HKU1 Not Detected Not Detected     Coronavirus NL63 Not Detected Not Detected    Coronavirus OC43 Not Detected Not Detected    COVID19 Not Detected Not Detected - Ref. Range    Human Metapneumovirus Not Detected Not Detected    Human Rhinovirus/Enterovirus Detected (A) Not Detected    Influenza A PCR Not Detected Not Detected    Influenza B PCR Not Detected Not Detected    Parainfluenza Virus 1 Not Detected Not Detected    Parainfluenza Virus 2 Not Detected Not Detected    Parainfluenza Virus 3 Not Detected Not Detected    Parainfluenza Virus 4 Not Detected Not Detected    RSV, PCR Not Detected Not Detected    Bordetella pertussis pcr Not Detected Not Detected    Bordetella parapertussis PCR Not Detected Not Detected    Chlamydophila pneumoniae PCR Not Detected Not Detected    Mycoplasma pneumo by PCR Not Detected Not Detected   Urinalysis With Microscopic If Indicated (No Culture) - Urine, Clean Catch    Collection Time: 12/24/21  2:16 PM    Specimen: Urine, Clean Catch   Result Value Ref Range    Color, UA Yellow Yellow, Straw    Appearance, UA Clear Clear    pH, UA <=5.0 5.0 - 8.0    Specific Gravity, UA 1.025 1.005 - 1.030    Glucose,  mg/dL (1+) (A) Negative    Ketones, UA Trace (A) Negative    Bilirubin, UA Negative Negative    Blood, UA Negative Negative    Protein, UA Trace (A) Negative    Leuk Esterase, UA Moderate (2+) (A) Negative    Nitrite, UA Negative Negative    Urobilinogen, UA 1.0 E.U./dL 0.2 - 1.0 E.U./dL   Urinalysis, Microscopic Only - Urine, Clean Catch    Collection Time: 12/24/21  2:16 PM    Specimen: Urine, Clean Catch   Result Value Ref Range    RBC, UA 0-2 None Seen, 0-2 /HPF    WBC, UA 13-20 (A) None Seen, 0-2 /HPF    Bacteria, UA 1+ (A) None Seen /HPF    Squamous Epithelial Cells, UA 3-6 (A) None Seen, 0-2 /HPF    Hyaline Casts, UA None Seen None Seen /LPF    Methodology Manual Light Microscopy    STAT Lactic Acid, Reflex    Collection Time: 12/24/21  3:36 PM    Specimen: Blood   Result Value Ref Range    Lactate  1.7 0.5 - 2.0 mmol/L       I ordered the above labs and reviewed the results.    RADIOLOGY  XR Chest AP    Result Date: 12/24/2021  PORTABLE CHEST X-RAY  HISTORY: Cough and fever. COVID evaluation.  Portable chest x-ray is provided. No prior imaging for correlation.  FINDINGS: The cardiomediastinal silhouette is normal. The lungs are clear. The costophrenic sulci are dry and the bones appear normal. There is no pneumothorax.      Negative.  This report was finalized on 12/24/2021 1:58 PM by Dr. Kale Callaway M.D.        I ordered the above noted radiological studies. I reviewed the images and results. I agree with the radiologist interpretation.    PROCEDURES  Procedures  EKG          EKG time: 1338  Rhythm/Rate: Normal sinus rhythm, rate 96  P waves and AL: normal  QRS, axis: Q waves inferiorly  ST and T waves: normal     Interpreted Contemporaneously by me, independently viewed  No previous EKG    MEDICATIONS GIVEN IN ER  Medications   lactated ringers bolus 1,000 mL (0 mL Intravenous Stopped 12/24/21 1607)   prochlorperazine (COMPAZINE) injection 5 mg (5 mg Intravenous Given 12/24/21 1555)   diphenhydrAMINE (BENADRYL) injection 25 mg (25 mg Intravenous Given 12/24/21 1555)   acetaminophen (TYLENOL) tablet 1,000 mg (1,000 mg Oral Given 12/24/21 1554)       PROGRESS, DATA ANALYSIS, CONSULTS, AND MEDICAL DECISION MAKING  A complete history and physical exam have been performed.  All available laboratory and imaging results have been reviewed by myself prior to disposition.    MDM  After the initial H&P, I discussed pertinent information from history and physical exam with patient/family.  Discussed differential diagnosis.  Discussed plan for ED evaluation/work-up/treatment.  All questions answered.  Patient/family is agreeable with plan.  ED Course as of 12/24/21 2009   Fri Dec 24, 2021   1346 Patient presents with increasing fatigue and known exposure to SARS-CoV-2.  She has had had decreased appetite.  We will  give patient IV fluids, check a respiratory viral panel, rule out sepsis and check for pneumonia.  We will also check urine to make sure she does not urinary tract infection. [DE]   1403 Patient's chest x-ray is negative. [DE]   1454 Lactate(!!): 2.6 [DE]   1454 WBC, UA(!): 13-20 [DE]   1454 Bacteria, UA(!): 1+ [DE]   1454 Leukocytes, UA(!): Moderate (2+) [DE]   1454 WBC: 7.88 [DE]   1507 Procalcitonin: 0.10  Patient's lactic acid is elevated but her procalcitonin is negative.  This supports the idea of dehydration. [DE]   1511 I updated patient on her results so far.  Patient denies dysuria, frequency or urgency.  I suspect that patient's urinalysis is secondary to contaminant and I will not treat.  Patient is complaining of a posterior headache with photophobia.  She does have a history of migraines though she states her headache pain is usually in different location.  I will give a migraine cocktail to see if that helps her. [DE]   1520 Potassium(!): 5.4 [DE]   1520 BUN(!): 21 [DE]   1520 Old records reviewed patient's alkaline phosphatase was 142 seven years ago.  Her LFTs are unchanged. [DE]   1552 Human Rhinovirus/Enterovirus(!): Detected  I notify patient that she has human rhinovirus.  This does explain the sinus congestion headache and body aches.  She is receiving IV fluids and is about to receive Benadryl and Compazine. [DE]   1625 Patient states she feels little better after the Compazine and Benadryl. I think it is safe to discharge patient to home. [DE]      ED Course User Index  [DE] Corey Lopez MD       AS OF 20:09 EST VITALS:    BP - 163/92  HR - 91  TEMP - 97.2 °F (36.2 °C)  O2 SATS - 98%    DIAGNOSIS  Final diagnoses:   Rhinovirus infection   Dehydration   Exhaustion   Other headache syndrome         DISPOSITION       DISCHARGE    Patient discharged in stable condition.    Reviewed implications of results, diagnosis, meds, responsibility to follow up, warning signs and symptoms of possible  worsening, potential complications and reasons to return to ER.    Patient/Family voiced understanding of above instructions.    Discussed plan for discharge, as there is no emergent indication for admission. Patient referred to primary care provider for BP management due to today's BP. Pt/family is agreeable and understands need for follow up and repeat testing.  Pt is aware that discharge does not mean that nothing is wrong but it indicates no emergency is present that requires admission and they must continue care with follow-up as given below or physician of their choice.     FOLLOW-UP  Donato Vilchis, DO  [address] [address] [phone]    Schedule an appointment as soon as possible for a visit           Medication List      New Prescriptions    ibuprofen 600 MG tablet  Commonly known as: ADVIL,MOTRIN  Take 1 tablet by mouth Every 8 (Eight) Hours As Needed for Moderate Pain .           Where to Get Your Medications      These medications were sent to [address] - [address] [facility] - [phone]  - [phone]   [address] [facility] [address]    Phone: [phone]   · ibuprofen 600 MG tablet  · methylPREDNISolone 4 MG dose pack          Corey Lopez MD  12/24/21 2009

## 2021-12-24 NOTE — DISCHARGE INSTRUCTIONS
Continue to use over-the-counter Tylenol and Mucinex as needed. Use the prescriptions as directed and follow-up with your family doctor. Drink plenty of fluids and rest over the weekend. Please return to the emergency department if symptoms worsen.

## 2021-12-24 NOTE — ED NOTES
Cough and SOA x2 weeks, multiple falls recently. Numbness to both feet. Hx of neuropathy with worsening neuropathy. Increased confusion. A&Ox4 in triage at this time.     Patient was placed in face mask during triage process. Patient was wearing facemask when I entered the room and throughout our encounter. I wore full protective equipment throughout this patient encounter including a face mask, eye protection, and gloves. Hand hygiene was performed before donning protective equipment and again following doffing of PPE after leaving the room.         Beverly Cruz, VIOLET  12/24/21 3839       Beverly Cruz RN  12/24/21 8724

## 2021-12-28 ENCOUNTER — HOSPITAL ENCOUNTER (OUTPATIENT)
Facility: HOSPITAL | Age: 56
Setting detail: OBSERVATION
Discharge: HOME | End: 2021-12-30
Attending: EMERGENCY MEDICINE | Admitting: EMERGENCY MEDICINE

## 2021-12-28 ENCOUNTER — APPOINTMENT (OUTPATIENT)
Dept: CT IMAGING | Facility: HOSPITAL | Age: 56
End: 2021-12-28

## 2021-12-28 DIAGNOSIS — R26.9 GAIT ABNORMALITY: ICD-10-CM

## 2021-12-28 DIAGNOSIS — G43.009 MIGRAINE WITHOUT AURA AND WITHOUT STATUS MIGRAINOSUS, NOT INTRACTABLE: ICD-10-CM

## 2021-12-28 DIAGNOSIS — R20.2 PARESTHESIAS: ICD-10-CM

## 2021-12-28 DIAGNOSIS — R47.9 DIFFICULTY WITH SPEECH: Primary | ICD-10-CM

## 2021-12-28 LAB
ALBUMIN SERPL-MCNC: 4.3 G/DL (ref 3.5–5.2)
ALBUMIN/GLOB SERPL: 1.5 G/DL
ALP SERPL-CCNC: 115 U/L (ref 39–117)
ALT SERPL W P-5'-P-CCNC: 36 U/L (ref 1–33)
ANION GAP SERPL CALCULATED.3IONS-SCNC: 11.6 MMOL/L (ref 5–15)
AST SERPL-CCNC: 22 U/L (ref 1–32)
BACTERIA UR QL AUTO: NORMAL /HPF
BASOPHILS # BLD AUTO: 0.03 10*3/MM3 (ref 0–0.2)
BASOPHILS NFR BLD AUTO: 0.3 % (ref 0–1.5)
BILIRUB SERPL-MCNC: 0.2 MG/DL (ref 0–1.2)
BILIRUB UR QL STRIP: NEGATIVE
BUN SERPL-MCNC: 33 MG/DL (ref 6–20)
BUN/CREAT SERPL: 42.3 (ref 7–25)
CALCIUM SPEC-SCNC: 9.6 MG/DL (ref 8.6–10.5)
CHLORIDE SERPL-SCNC: 100 MMOL/L (ref 98–107)
CLARITY UR: CLEAR
CO2 SERPL-SCNC: 24.4 MMOL/L (ref 22–29)
COLOR UR: YELLOW
CREAT SERPL-MCNC: 0.78 MG/DL (ref 0.57–1)
DEPRECATED RDW RBC AUTO: 39.9 FL (ref 37–54)
EOSINOPHIL # BLD AUTO: 0.01 10*3/MM3 (ref 0–0.4)
EOSINOPHIL NFR BLD AUTO: 0.1 % (ref 0.3–6.2)
ERYTHROCYTE [DISTWIDTH] IN BLOOD BY AUTOMATED COUNT: 12.1 % (ref 12.3–15.4)
GFR SERPL CREATININE-BSD FRML MDRD: 76 ML/MIN/1.73
GLOBULIN UR ELPH-MCNC: 2.8 GM/DL
GLUCOSE SERPL-MCNC: 242 MG/DL (ref 65–99)
GLUCOSE UR STRIP-MCNC: NEGATIVE MG/DL
HCT VFR BLD AUTO: 41.8 % (ref 34–46.6)
HGB BLD-MCNC: 14 G/DL (ref 12–15.9)
HGB UR QL STRIP.AUTO: NEGATIVE
HYALINE CASTS UR QL AUTO: NORMAL /LPF
IMM GRANULOCYTES # BLD AUTO: 0.08 10*3/MM3 (ref 0–0.05)
IMM GRANULOCYTES NFR BLD AUTO: 0.7 % (ref 0–0.5)
KETONES UR QL STRIP: NEGATIVE
LEUKOCYTE ESTERASE UR QL STRIP.AUTO: ABNORMAL
LYMPHOCYTES # BLD AUTO: 2.31 10*3/MM3 (ref 0.7–3.1)
LYMPHOCYTES NFR BLD AUTO: 19.6 % (ref 19.6–45.3)
MCH RBC QN AUTO: 30.6 PG (ref 26.6–33)
MCHC RBC AUTO-ENTMCNC: 33.5 G/DL (ref 31.5–35.7)
MCV RBC AUTO: 91.3 FL (ref 79–97)
MONOCYTES # BLD AUTO: 0.93 10*3/MM3 (ref 0.1–0.9)
MONOCYTES NFR BLD AUTO: 7.9 % (ref 5–12)
NEUTROPHILS NFR BLD AUTO: 71.4 % (ref 42.7–76)
NEUTROPHILS NFR BLD AUTO: 8.44 10*3/MM3 (ref 1.7–7)
NITRITE UR QL STRIP: NEGATIVE
NRBC BLD AUTO-RTO: 0 /100 WBC (ref 0–0.2)
PH UR STRIP.AUTO: 5.5 [PH] (ref 5–8)
PLATELET # BLD AUTO: 248 10*3/MM3 (ref 140–450)
PMV BLD AUTO: 9.2 FL (ref 6–12)
POTASSIUM SERPL-SCNC: 5.3 MMOL/L (ref 3.5–5.2)
PROT SERPL-MCNC: 7.1 G/DL (ref 6–8.5)
PROT UR QL STRIP: NEGATIVE
RBC # BLD AUTO: 4.58 10*6/MM3 (ref 3.77–5.28)
RBC # UR STRIP: NORMAL /HPF
REF LAB TEST METHOD: NORMAL
SODIUM SERPL-SCNC: 136 MMOL/L (ref 136–145)
SP GR UR STRIP: 1.01 (ref 1–1.03)
SQUAMOUS #/AREA URNS HPF: NORMAL /HPF
UROBILINOGEN UR QL STRIP: ABNORMAL
WBC # UR STRIP: NORMAL /HPF
WBC NRBC COR # BLD: 11.8 10*3/MM3 (ref 3.4–10.8)

## 2021-12-28 PROCEDURE — 80053 COMPREHEN METABOLIC PANEL: CPT | Performed by: NURSE PRACTITIONER

## 2021-12-28 PROCEDURE — 96374 THER/PROPH/DIAG INJ IV PUSH: CPT

## 2021-12-28 PROCEDURE — 96375 TX/PRO/DX INJ NEW DRUG ADDON: CPT

## 2021-12-28 PROCEDURE — 70450 CT HEAD/BRAIN W/O DYE: CPT

## 2021-12-28 PROCEDURE — 85025 COMPLETE CBC W/AUTO DIFF WBC: CPT | Performed by: NURSE PRACTITIONER

## 2021-12-28 PROCEDURE — 99285 EMERGENCY DEPT VISIT HI MDM: CPT

## 2021-12-28 PROCEDURE — 25010000002 DROPERIDOL PER 5 MG: Performed by: NURSE PRACTITIONER

## 2021-12-28 PROCEDURE — 25010000002 DIPHENHYDRAMINE PER 50 MG: Performed by: NURSE PRACTITIONER

## 2021-12-28 PROCEDURE — 81001 URINALYSIS AUTO W/SCOPE: CPT | Performed by: NURSE PRACTITIONER

## 2021-12-28 PROCEDURE — 36415 COLL VENOUS BLD VENIPUNCTURE: CPT | Performed by: NURSE PRACTITIONER

## 2021-12-28 RX ORDER — SODIUM CHLORIDE 0.9 % (FLUSH) 0.9 %
10 SYRINGE (ML) INJECTION AS NEEDED
Status: DISCONTINUED | OUTPATIENT
Start: 2021-12-28 | End: 2021-12-30 | Stop reason: HOSPADM

## 2021-12-28 RX ORDER — DIPHENHYDRAMINE HYDROCHLORIDE 50 MG/ML
25 INJECTION INTRAMUSCULAR; INTRAVENOUS ONCE
Status: COMPLETED | OUTPATIENT
Start: 2021-12-28 | End: 2021-12-28

## 2021-12-28 RX ORDER — DROPERIDOL 2.5 MG/ML
2.5 INJECTION, SOLUTION INTRAMUSCULAR; INTRAVENOUS ONCE
Status: COMPLETED | OUTPATIENT
Start: 2021-12-28 | End: 2021-12-28

## 2021-12-28 RX ADMIN — DIPHENHYDRAMINE HYDROCHLORIDE 25 MG: 50 INJECTION, SOLUTION INTRAMUSCULAR; INTRAVENOUS at 23:14

## 2021-12-28 RX ADMIN — DROPERIDOL 2.5 MG: 2.5 INJECTION, SOLUTION INTRAMUSCULAR; INTRAVENOUS at 23:12

## 2021-12-29 ENCOUNTER — APPOINTMENT (OUTPATIENT)
Dept: MRI IMAGING | Facility: HOSPITAL | Age: 56
End: 2021-12-29

## 2021-12-29 PROBLEM — G43.909 MIGRAINE: Status: ACTIVE | Noted: 2021-12-29

## 2021-12-29 LAB
BACTERIA SPEC AEROBE CULT: NORMAL
ERYTHROCYTE [SEDIMENTATION RATE] IN BLOOD: 14 MM/HR (ref 0–30)
GLUCOSE BLDC GLUCOMTR-MCNC: 107 MG/DL (ref 70–130)
GLUCOSE BLDC GLUCOMTR-MCNC: 110 MG/DL (ref 70–130)
GLUCOSE BLDC GLUCOMTR-MCNC: 246 MG/DL (ref 70–130)
SARS-COV-2 RNA RESP QL NAA+PROBE: NOT DETECTED

## 2021-12-29 PROCEDURE — 70551 MRI BRAIN STEM W/O DYE: CPT

## 2021-12-29 PROCEDURE — G0378 HOSPITAL OBSERVATION PER HR: HCPCS

## 2021-12-29 PROCEDURE — 82962 GLUCOSE BLOOD TEST: CPT

## 2021-12-29 PROCEDURE — 96375 TX/PRO/DX INJ NEW DRUG ADDON: CPT

## 2021-12-29 PROCEDURE — 25010000002 METOCLOPRAMIDE PER 10 MG: Performed by: NURSE PRACTITIONER

## 2021-12-29 PROCEDURE — 25010000002 METHYLPREDNISOLONE PER 125 MG: Performed by: PSYCHIATRY & NEUROLOGY

## 2021-12-29 PROCEDURE — 99204 OFFICE O/P NEW MOD 45 MIN: CPT | Performed by: PSYCHIATRY & NEUROLOGY

## 2021-12-29 PROCEDURE — 25010000002 DIPHENHYDRAMINE PER 50 MG: Performed by: NURSE PRACTITIONER

## 2021-12-29 PROCEDURE — U0003 INFECTIOUS AGENT DETECTION BY NUCLEIC ACID (DNA OR RNA); SEVERE ACUTE RESPIRATORY SYNDROME CORONAVIRUS 2 (SARS-COV-2) (CORONAVIRUS DISEASE [COVID-19]), AMPLIFIED PROBE TECHNIQUE, MAKING USE OF HIGH THROUGHPUT TECHNOLOGIES AS DESCRIBED BY CMS-2020-01-R: HCPCS | Performed by: NURSE PRACTITIONER

## 2021-12-29 PROCEDURE — 96376 TX/PRO/DX INJ SAME DRUG ADON: CPT

## 2021-12-29 PROCEDURE — 63710000001 INSULIN LISPRO (HUMAN) PER 5 UNITS: Performed by: NURSE PRACTITIONER

## 2021-12-29 PROCEDURE — 85652 RBC SED RATE AUTOMATED: CPT | Performed by: PSYCHIATRY & NEUROLOGY

## 2021-12-29 RX ORDER — ONDANSETRON 2 MG/ML
4 INJECTION INTRAMUSCULAR; INTRAVENOUS EVERY 6 HOURS PRN
Status: DISCONTINUED | OUTPATIENT
Start: 2021-12-29 | End: 2021-12-30 | Stop reason: HOSPADM

## 2021-12-29 RX ORDER — LISINOPRIL 20 MG/1
20 TABLET ORAL DAILY
Status: DISCONTINUED | OUTPATIENT
Start: 2021-12-30 | End: 2021-12-30 | Stop reason: HOSPADM

## 2021-12-29 RX ORDER — BUTALBITAL, ACETAMINOPHEN AND CAFFEINE 50; 325; 40 MG/1; MG/1; MG/1
1 TABLET ORAL ONCE
Status: COMPLETED | OUTPATIENT
Start: 2021-12-29 | End: 2021-12-29

## 2021-12-29 RX ORDER — SODIUM CHLORIDE 0.9 % (FLUSH) 0.9 %
10 SYRINGE (ML) INJECTION AS NEEDED
Status: DISCONTINUED | OUTPATIENT
Start: 2021-12-29 | End: 2021-12-30 | Stop reason: HOSPADM

## 2021-12-29 RX ORDER — BACLOFEN 10 MG/1
10 TABLET ORAL 2 TIMES DAILY
Status: DISCONTINUED | OUTPATIENT
Start: 2021-12-29 | End: 2021-12-30 | Stop reason: HOSPADM

## 2021-12-29 RX ORDER — DULOXETIN HYDROCHLORIDE 60 MG/1
60 CAPSULE, DELAYED RELEASE ORAL DAILY
Status: DISCONTINUED | OUTPATIENT
Start: 2021-12-29 | End: 2021-12-30 | Stop reason: HOSPADM

## 2021-12-29 RX ORDER — AMITRIPTYLINE HYDROCHLORIDE 25 MG/1
25 TABLET, FILM COATED ORAL NIGHTLY PRN
Status: DISCONTINUED | OUTPATIENT
Start: 2021-12-29 | End: 2021-12-30 | Stop reason: HOSPADM

## 2021-12-29 RX ORDER — LISINOPRIL 10 MG/1
10 TABLET ORAL ONCE
Status: COMPLETED | OUTPATIENT
Start: 2021-12-29 | End: 2021-12-29

## 2021-12-29 RX ORDER — METOCLOPRAMIDE HYDROCHLORIDE 5 MG/ML
10 INJECTION INTRAMUSCULAR; INTRAVENOUS ONCE
Status: COMPLETED | OUTPATIENT
Start: 2021-12-29 | End: 2021-12-29

## 2021-12-29 RX ORDER — NICOTINE POLACRILEX 4 MG
15 LOZENGE BUCCAL
Status: DISCONTINUED | OUTPATIENT
Start: 2021-12-29 | End: 2021-12-30 | Stop reason: HOSPADM

## 2021-12-29 RX ORDER — SUMATRIPTAN 50 MG/1
50 TABLET, FILM COATED ORAL
Status: DISCONTINUED | OUTPATIENT
Start: 2021-12-29 | End: 2021-12-30 | Stop reason: HOSPADM

## 2021-12-29 RX ORDER — SODIUM CHLORIDE 0.9 % (FLUSH) 0.9 %
10 SYRINGE (ML) INJECTION EVERY 12 HOURS SCHEDULED
Status: DISCONTINUED | OUTPATIENT
Start: 2021-12-29 | End: 2021-12-30 | Stop reason: HOSPADM

## 2021-12-29 RX ORDER — DIPHENHYDRAMINE HYDROCHLORIDE 50 MG/ML
12.5 INJECTION INTRAMUSCULAR; INTRAVENOUS ONCE
Status: COMPLETED | OUTPATIENT
Start: 2021-12-29 | End: 2021-12-29

## 2021-12-29 RX ORDER — INSULIN LISPRO 100 [IU]/ML
0-9 INJECTION, SOLUTION INTRAVENOUS; SUBCUTANEOUS
Status: DISCONTINUED | OUTPATIENT
Start: 2021-12-29 | End: 2021-12-30 | Stop reason: HOSPADM

## 2021-12-29 RX ORDER — MELOXICAM 15 MG/1
15 TABLET ORAL DAILY
COMMUNITY

## 2021-12-29 RX ORDER — NITROGLYCERIN 0.4 MG/1
0.4 TABLET SUBLINGUAL
Status: DISCONTINUED | OUTPATIENT
Start: 2021-12-29 | End: 2021-12-30 | Stop reason: HOSPADM

## 2021-12-29 RX ORDER — INSULIN GLARGINE AND LIXISENATIDE 100; 33 U/ML; UG/ML
50 INJECTION, SOLUTION SUBCUTANEOUS DAILY
COMMUNITY

## 2021-12-29 RX ORDER — METHYLPREDNISOLONE SODIUM SUCCINATE 125 MG/2ML
60 INJECTION, POWDER, LYOPHILIZED, FOR SOLUTION INTRAMUSCULAR; INTRAVENOUS DAILY
Status: COMPLETED | OUTPATIENT
Start: 2021-12-29 | End: 2021-12-30

## 2021-12-29 RX ORDER — ONDANSETRON 4 MG/1
4 TABLET, FILM COATED ORAL EVERY 6 HOURS PRN
Status: DISCONTINUED | OUTPATIENT
Start: 2021-12-29 | End: 2021-12-30 | Stop reason: HOSPADM

## 2021-12-29 RX ORDER — GLIPIZIDE 10 MG/1
10 TABLET ORAL
Status: DISCONTINUED | OUTPATIENT
Start: 2021-12-29 | End: 2021-12-30 | Stop reason: HOSPADM

## 2021-12-29 RX ORDER — ACETAMINOPHEN 325 MG/1
650 TABLET ORAL EVERY 4 HOURS PRN
Status: DISCONTINUED | OUTPATIENT
Start: 2021-12-29 | End: 2021-12-30 | Stop reason: HOSPADM

## 2021-12-29 RX ORDER — PANTOPRAZOLE SODIUM 40 MG/1
40 TABLET, DELAYED RELEASE ORAL EVERY MORNING
Status: DISCONTINUED | OUTPATIENT
Start: 2021-12-29 | End: 2021-12-30 | Stop reason: HOSPADM

## 2021-12-29 RX ORDER — ROSUVASTATIN CALCIUM 20 MG/1
10 TABLET, COATED ORAL DAILY
Status: DISCONTINUED | OUTPATIENT
Start: 2021-12-29 | End: 2021-12-30 | Stop reason: HOSPADM

## 2021-12-29 RX ORDER — BUTALBITAL, ACETAMINOPHEN AND CAFFEINE 50; 325; 40 MG/1; MG/1; MG/1
1 TABLET ORAL EVERY 4 HOURS PRN
Status: DISCONTINUED | OUTPATIENT
Start: 2021-12-29 | End: 2021-12-30 | Stop reason: HOSPADM

## 2021-12-29 RX ORDER — LISINOPRIL 10 MG/1
10 TABLET ORAL DAILY
Status: DISCONTINUED | OUTPATIENT
Start: 2021-12-29 | End: 2021-12-29

## 2021-12-29 RX ORDER — DEXTROSE MONOHYDRATE 25 G/50ML
25 INJECTION, SOLUTION INTRAVENOUS
Status: DISCONTINUED | OUTPATIENT
Start: 2021-12-29 | End: 2021-12-30 | Stop reason: HOSPADM

## 2021-12-29 RX ADMIN — DULOXETINE HYDROCHLORIDE 60 MG: 60 CAPSULE, DELAYED RELEASE ORAL at 12:38

## 2021-12-29 RX ADMIN — AMITRIPTYLINE HYDROCHLORIDE 75 MG: 50 TABLET, FILM COATED ORAL at 20:08

## 2021-12-29 RX ADMIN — BACLOFEN 10 MG: 10 TABLET ORAL at 14:07

## 2021-12-29 RX ADMIN — INSULIN LISPRO 4 UNITS: 100 INJECTION, SOLUTION INTRAVENOUS; SUBCUTANEOUS at 17:24

## 2021-12-29 RX ADMIN — LISINOPRIL 10 MG: 10 TABLET ORAL at 16:27

## 2021-12-29 RX ADMIN — METOCLOPRAMIDE HYDROCHLORIDE 10 MG: 5 INJECTION INTRAMUSCULAR; INTRAVENOUS at 06:49

## 2021-12-29 RX ADMIN — BUTALBITAL, ACETAMINOPHEN, AND CAFFEINE 1 TABLET: 50; 325; 40 TABLET ORAL at 20:08

## 2021-12-29 RX ADMIN — SODIUM CHLORIDE, PRESERVATIVE FREE 10 ML: 5 INJECTION INTRAVENOUS at 20:08

## 2021-12-29 RX ADMIN — METHYLPREDNISOLONE SODIUM SUCCINATE 60 MG: 125 INJECTION, POWDER, FOR SOLUTION INTRAMUSCULAR; INTRAVENOUS at 16:27

## 2021-12-29 RX ADMIN — METFORMIN HYDROCHLORIDE 1000 MG: 1000 TABLET ORAL at 12:38

## 2021-12-29 RX ADMIN — ACETAMINOPHEN 650 MG: 325 TABLET ORAL at 06:08

## 2021-12-29 RX ADMIN — SODIUM CHLORIDE, PRESERVATIVE FREE 10 ML: 5 INJECTION INTRAVENOUS at 12:39

## 2021-12-29 RX ADMIN — LISINOPRIL 10 MG: 10 TABLET ORAL at 12:31

## 2021-12-29 RX ADMIN — SUMATRIPTAN SUCCINATE 50 MG: 50 TABLET ORAL at 14:07

## 2021-12-29 RX ADMIN — SODIUM CHLORIDE, PRESERVATIVE FREE 10 ML: 5 INJECTION INTRAVENOUS at 01:20

## 2021-12-29 RX ADMIN — PANTOPRAZOLE SODIUM 40 MG: 40 TABLET, DELAYED RELEASE ORAL at 08:18

## 2021-12-29 RX ADMIN — BACLOFEN 10 MG: 10 TABLET ORAL at 20:08

## 2021-12-29 RX ADMIN — GLIPIZIDE 10 MG: 10 TABLET ORAL at 17:24

## 2021-12-29 RX ADMIN — SUMATRIPTAN SUCCINATE 50 MG: 50 TABLET ORAL at 15:59

## 2021-12-29 RX ADMIN — BUTALBITAL, ACETAMINOPHEN, AND CAFFEINE 1 TABLET: 50; 325; 40 TABLET ORAL at 06:49

## 2021-12-29 RX ADMIN — SODIUM CHLORIDE 500 ML: 9 INJECTION, SOLUTION INTRAVENOUS at 06:50

## 2021-12-29 RX ADMIN — ROSUVASTATIN CALCIUM 10 MG: 20 TABLET, FILM COATED ORAL at 12:38

## 2021-12-29 RX ADMIN — METFORMIN HYDROCHLORIDE 1000 MG: 1000 TABLET ORAL at 17:24

## 2021-12-29 RX ADMIN — DIPHENHYDRAMINE HYDROCHLORIDE 12.5 MG: 50 INJECTION, SOLUTION INTRAMUSCULAR; INTRAVENOUS at 06:49

## 2021-12-30 VITALS
SYSTOLIC BLOOD PRESSURE: 156 MMHG | OXYGEN SATURATION: 97 % | DIASTOLIC BLOOD PRESSURE: 104 MMHG | BODY MASS INDEX: 45.59 KG/M2 | HEIGHT: 55 IN | WEIGHT: 197 LBS | HEART RATE: 105 BPM | TEMPERATURE: 98.3 F | RESPIRATION RATE: 18 BRPM

## 2021-12-30 LAB
GLUCOSE BLDC GLUCOMTR-MCNC: 275 MG/DL (ref 70–130)
GLUCOSE BLDC GLUCOMTR-MCNC: 297 MG/DL (ref 70–130)

## 2021-12-30 PROCEDURE — 96376 TX/PRO/DX INJ SAME DRUG ADON: CPT

## 2021-12-30 PROCEDURE — 63710000001 INSULIN LISPRO (HUMAN) PER 5 UNITS: Performed by: NURSE PRACTITIONER

## 2021-12-30 PROCEDURE — G0378 HOSPITAL OBSERVATION PER HR: HCPCS

## 2021-12-30 PROCEDURE — 99212 OFFICE O/P EST SF 10 MIN: CPT | Performed by: PSYCHIATRY & NEUROLOGY

## 2021-12-30 PROCEDURE — 25010000002 METHYLPREDNISOLONE PER 125 MG: Performed by: PSYCHIATRY & NEUROLOGY

## 2021-12-30 PROCEDURE — 82962 GLUCOSE BLOOD TEST: CPT

## 2021-12-30 RX ORDER — AMITRIPTYLINE HYDROCHLORIDE 25 MG/1
75 TABLET, FILM COATED ORAL NIGHTLY
Qty: 90 TABLET | Refills: 0 | Status: SHIPPED | OUTPATIENT
Start: 2021-12-30

## 2021-12-30 RX ORDER — BACLOFEN 10 MG/1
10 TABLET ORAL 2 TIMES DAILY
Qty: 60 TABLET | Refills: 0 | Status: SHIPPED | OUTPATIENT
Start: 2021-12-30

## 2021-12-30 RX ORDER — SUMATRIPTAN 50 MG/1
TABLET, FILM COATED ORAL
Qty: 9 TABLET | Refills: 0 | Status: SHIPPED | OUTPATIENT
Start: 2021-12-30

## 2021-12-30 RX ADMIN — GLIPIZIDE 10 MG: 10 TABLET ORAL at 06:38

## 2021-12-30 RX ADMIN — DULOXETINE HYDROCHLORIDE 60 MG: 60 CAPSULE, DELAYED RELEASE ORAL at 08:45

## 2021-12-30 RX ADMIN — METHYLPREDNISOLONE SODIUM SUCCINATE 60 MG: 125 INJECTION, POWDER, FOR SOLUTION INTRAMUSCULAR; INTRAVENOUS at 08:44

## 2021-12-30 RX ADMIN — METFORMIN HYDROCHLORIDE 1000 MG: 1000 TABLET ORAL at 08:45

## 2021-12-30 RX ADMIN — BUTALBITAL, ACETAMINOPHEN, AND CAFFEINE 1 TABLET: 50; 325; 40 TABLET ORAL at 06:40

## 2021-12-30 RX ADMIN — ROSUVASTATIN CALCIUM 10 MG: 20 TABLET, FILM COATED ORAL at 08:45

## 2021-12-30 RX ADMIN — PANTOPRAZOLE SODIUM 40 MG: 40 TABLET, DELAYED RELEASE ORAL at 06:38

## 2021-12-30 RX ADMIN — BACLOFEN 10 MG: 10 TABLET ORAL at 08:45

## 2021-12-30 RX ADMIN — LISINOPRIL 20 MG: 20 TABLET ORAL at 08:44

## 2021-12-30 RX ADMIN — SODIUM CHLORIDE, PRESERVATIVE FREE 10 ML: 5 INJECTION INTRAVENOUS at 08:44

## 2021-12-30 RX ADMIN — INSULIN LISPRO 6 UNITS: 100 INJECTION, SOLUTION INTRAVENOUS; SUBCUTANEOUS at 06:38

## 2021-12-30 RX ADMIN — INSULIN LISPRO 6 UNITS: 100 INJECTION, SOLUTION INTRAVENOUS; SUBCUTANEOUS at 11:41

## 2022-01-02 LAB — QT INTERVAL: 342 MS

## 2024-02-23 ENCOUNTER — APPOINTMENT (OUTPATIENT)
Dept: GENERAL RADIOLOGY | Facility: HOSPITAL | Age: 59
DRG: 638 | End: 2024-02-23
Payer: MEDICAID

## 2024-02-23 ENCOUNTER — HOSPITAL ENCOUNTER (INPATIENT)
Facility: HOSPITAL | Age: 59
LOS: 1 days | Discharge: HOME | DRG: 638 | End: 2024-02-26
Attending: EMERGENCY MEDICINE | Admitting: INTERNAL MEDICINE
Payer: MEDICAID

## 2024-02-23 DIAGNOSIS — Z79.4 TYPE 2 DIABETES MELLITUS WITH HYPERGLYCEMIA, WITH LONG-TERM CURRENT USE OF INSULIN: ICD-10-CM

## 2024-02-23 DIAGNOSIS — E11.628 DIABETIC FOOT INFECTION: Primary | ICD-10-CM

## 2024-02-23 DIAGNOSIS — M51.26 LUMBAR DISC HERNIATION: ICD-10-CM

## 2024-02-23 DIAGNOSIS — E87.20 LACTIC ACIDOSIS: ICD-10-CM

## 2024-02-23 DIAGNOSIS — E11.65 TYPE 2 DIABETES MELLITUS WITH HYPERGLYCEMIA, WITH LONG-TERM CURRENT USE OF INSULIN: ICD-10-CM

## 2024-02-23 DIAGNOSIS — L08.9 DIABETIC FOOT INFECTION: Primary | ICD-10-CM

## 2024-02-23 PROBLEM — E11.9 INSULIN DEPENDENT TYPE 2 DIABETES MELLITUS: Chronic | Status: ACTIVE | Noted: 2021-11-24

## 2024-02-23 PROBLEM — G43.909 MIGRAINE HEADACHE: Status: ACTIVE | Noted: 2020-12-23

## 2024-02-23 PROBLEM — F41.1 GENERALIZED ANXIETY DISORDER: Status: ACTIVE | Noted: 2020-12-23

## 2024-02-23 PROBLEM — L97.519 DIABETIC ULCER OF RIGHT GREAT TOE: Status: ACTIVE | Noted: 2024-02-23

## 2024-02-23 PROBLEM — E11.42 DIABETIC PERIPHERAL NEUROPATHY: Chronic | Status: ACTIVE | Noted: 2020-12-23

## 2024-02-23 PROBLEM — E11.621 DIABETIC ULCER OF RIGHT GREAT TOE: Status: ACTIVE | Noted: 2024-02-23

## 2024-02-23 PROBLEM — R73.9 HYPERGLYCEMIA: Status: ACTIVE | Noted: 2024-02-23

## 2024-02-23 PROBLEM — I10 HYPERTENSION: Status: ACTIVE | Noted: 2021-06-23

## 2024-02-23 LAB
ALBUMIN SERPL-MCNC: 4 G/DL (ref 3.5–5.2)
ALBUMIN/GLOB SERPL: 1.4 G/DL
ALP SERPL-CCNC: 148 U/L (ref 39–117)
ALT SERPL W P-5'-P-CCNC: 26 U/L (ref 1–33)
ANION GAP SERPL CALCULATED.3IONS-SCNC: 13.5 MMOL/L (ref 5–15)
AST SERPL-CCNC: 14 U/L (ref 1–32)
BASOPHILS # BLD AUTO: 0.04 10*3/MM3 (ref 0–0.2)
BASOPHILS NFR BLD AUTO: 0.5 % (ref 0–1.5)
BILIRUB SERPL-MCNC: 0.3 MG/DL (ref 0–1.2)
BUN SERPL-MCNC: 33 MG/DL (ref 6–20)
BUN/CREAT SERPL: 35.9 (ref 7–25)
CALCIUM SPEC-SCNC: 10 MG/DL (ref 8.6–10.5)
CHLORIDE SERPL-SCNC: 98 MMOL/L (ref 98–107)
CO2 SERPL-SCNC: 21.5 MMOL/L (ref 22–29)
CREAT SERPL-MCNC: 0.92 MG/DL (ref 0.57–1)
CRP SERPL-MCNC: 0.51 MG/DL (ref 0–0.5)
D-LACTATE SERPL-SCNC: 2.1 MMOL/L (ref 0.5–2)
D-LACTATE SERPL-SCNC: 3.4 MMOL/L (ref 0.5–2)
DEPRECATED RDW RBC AUTO: 39.7 FL (ref 37–54)
EGFRCR SERPLBLD CKD-EPI 2021: 71.9 ML/MIN/1.73
EOSINOPHIL # BLD AUTO: 0.19 10*3/MM3 (ref 0–0.4)
EOSINOPHIL NFR BLD AUTO: 2.3 % (ref 0.3–6.2)
ERYTHROCYTE [DISTWIDTH] IN BLOOD BY AUTOMATED COUNT: 11.9 % (ref 12.3–15.4)
ERYTHROCYTE [SEDIMENTATION RATE] IN BLOOD: 20 MM/HR (ref 0–30)
GLOBULIN UR ELPH-MCNC: 2.9 GM/DL
GLUCOSE BLDC GLUCOMTR-MCNC: 211 MG/DL (ref 70–130)
GLUCOSE SERPL-MCNC: 447 MG/DL (ref 65–99)
HCT VFR BLD AUTO: 39.2 % (ref 34–46.6)
HGB BLD-MCNC: 13.3 G/DL (ref 12–15.9)
IMM GRANULOCYTES # BLD AUTO: 0.02 10*3/MM3 (ref 0–0.05)
IMM GRANULOCYTES NFR BLD AUTO: 0.2 % (ref 0–0.5)
LYMPHOCYTES # BLD AUTO: 1.62 10*3/MM3 (ref 0.7–3.1)
LYMPHOCYTES NFR BLD AUTO: 19.8 % (ref 19.6–45.3)
MCH RBC QN AUTO: 30.9 PG (ref 26.6–33)
MCHC RBC AUTO-ENTMCNC: 33.9 G/DL (ref 31.5–35.7)
MCV RBC AUTO: 91 FL (ref 79–97)
MONOCYTES # BLD AUTO: 0.71 10*3/MM3 (ref 0.1–0.9)
MONOCYTES NFR BLD AUTO: 8.7 % (ref 5–12)
NEUTROPHILS NFR BLD AUTO: 5.62 10*3/MM3 (ref 1.7–7)
NEUTROPHILS NFR BLD AUTO: 68.5 % (ref 42.7–76)
NRBC BLD AUTO-RTO: 0 /100 WBC (ref 0–0.2)
PLATELET # BLD AUTO: 205 10*3/MM3 (ref 140–450)
PMV BLD AUTO: 9.9 FL (ref 6–12)
POTASSIUM SERPL-SCNC: 5.1 MMOL/L (ref 3.5–5.2)
PROT SERPL-MCNC: 6.9 G/DL (ref 6–8.5)
RBC # BLD AUTO: 4.31 10*6/MM3 (ref 3.77–5.28)
SODIUM SERPL-SCNC: 133 MMOL/L (ref 136–145)
WBC NRBC COR # BLD AUTO: 8.2 10*3/MM3 (ref 3.4–10.8)

## 2024-02-23 PROCEDURE — 73630 X-RAY EXAM OF FOOT: CPT

## 2024-02-23 PROCEDURE — 25010000002 VANCOMYCIN 10 G RECONSTITUTED SOLUTION: Performed by: PHYSICIAN ASSISTANT

## 2024-02-23 PROCEDURE — 87147 CULTURE TYPE IMMUNOLOGIC: CPT | Performed by: PHYSICIAN ASSISTANT

## 2024-02-23 PROCEDURE — 87205 SMEAR GRAM STAIN: CPT | Performed by: PHYSICIAN ASSISTANT

## 2024-02-23 PROCEDURE — 80053 COMPREHEN METABOLIC PANEL: CPT | Performed by: PHYSICIAN ASSISTANT

## 2024-02-23 PROCEDURE — 63710000001 INSULIN REGULAR HUMAN PER 5 UNITS: Performed by: PHYSICIAN ASSISTANT

## 2024-02-23 PROCEDURE — 99285 EMERGENCY DEPT VISIT HI MDM: CPT

## 2024-02-23 PROCEDURE — 87070 CULTURE OTHR SPECIMN AEROBIC: CPT | Performed by: PHYSICIAN ASSISTANT

## 2024-02-23 PROCEDURE — 25810000003 SODIUM CHLORIDE 0.9 % SOLUTION: Performed by: PHYSICIAN ASSISTANT

## 2024-02-23 PROCEDURE — G0378 HOSPITAL OBSERVATION PER HR: HCPCS

## 2024-02-23 PROCEDURE — 36415 COLL VENOUS BLD VENIPUNCTURE: CPT | Performed by: PHYSICIAN ASSISTANT

## 2024-02-23 PROCEDURE — 87186 SC STD MICRODIL/AGAR DIL: CPT | Performed by: PHYSICIAN ASSISTANT

## 2024-02-23 PROCEDURE — 85025 COMPLETE CBC W/AUTO DIFF WBC: CPT | Performed by: PHYSICIAN ASSISTANT

## 2024-02-23 PROCEDURE — 82948 REAGENT STRIP/BLOOD GLUCOSE: CPT

## 2024-02-23 PROCEDURE — 25010000002 PIPERACILLIN SOD-TAZOBACTAM PER 1 G: Performed by: PHYSICIAN ASSISTANT

## 2024-02-23 PROCEDURE — 83605 ASSAY OF LACTIC ACID: CPT | Performed by: PHYSICIAN ASSISTANT

## 2024-02-23 PROCEDURE — 87040 BLOOD CULTURE FOR BACTERIA: CPT | Performed by: PHYSICIAN ASSISTANT

## 2024-02-23 PROCEDURE — 86140 C-REACTIVE PROTEIN: CPT | Performed by: PHYSICIAN ASSISTANT

## 2024-02-23 PROCEDURE — 85652 RBC SED RATE AUTOMATED: CPT | Performed by: PHYSICIAN ASSISTANT

## 2024-02-23 PROCEDURE — 63710000001 INSULIN LISPRO (HUMAN) PER 5 UNITS: Performed by: NURSE PRACTITIONER

## 2024-02-23 RX ORDER — IBUPROFEN 600 MG/1
1 TABLET ORAL
Status: DISCONTINUED | OUTPATIENT
Start: 2024-02-23 | End: 2024-02-26 | Stop reason: HOSPADM

## 2024-02-23 RX ORDER — BISACODYL 10 MG
10 SUPPOSITORY, RECTAL RECTAL DAILY PRN
Status: DISCONTINUED | OUTPATIENT
Start: 2024-02-23 | End: 2024-02-26 | Stop reason: HOSPADM

## 2024-02-23 RX ORDER — BACLOFEN 10 MG/1
10 TABLET ORAL 2 TIMES DAILY
Status: DISCONTINUED | OUTPATIENT
Start: 2024-02-24 | End: 2024-02-26 | Stop reason: HOSPADM

## 2024-02-23 RX ORDER — SODIUM CHLORIDE 0.9 % (FLUSH) 0.9 %
10 SYRINGE (ML) INJECTION EVERY 12 HOURS SCHEDULED
Status: DISCONTINUED | OUTPATIENT
Start: 2024-02-23 | End: 2024-02-26 | Stop reason: HOSPADM

## 2024-02-23 RX ORDER — AMOXICILLIN 250 MG
2 CAPSULE ORAL 2 TIMES DAILY PRN
Status: DISCONTINUED | OUTPATIENT
Start: 2024-02-23 | End: 2024-02-26 | Stop reason: HOSPADM

## 2024-02-23 RX ORDER — GABAPENTIN 300 MG/1
300 CAPSULE ORAL NIGHTLY PRN
Status: DISCONTINUED | OUTPATIENT
Start: 2024-02-23 | End: 2024-02-26 | Stop reason: HOSPADM

## 2024-02-23 RX ORDER — NITROGLYCERIN 0.4 MG/1
0.4 TABLET SUBLINGUAL
Status: DISCONTINUED | OUTPATIENT
Start: 2024-02-23 | End: 2024-02-26 | Stop reason: HOSPADM

## 2024-02-23 RX ORDER — POLYETHYLENE GLYCOL 3350 17 G/17G
17 POWDER, FOR SOLUTION ORAL DAILY PRN
Status: DISCONTINUED | OUTPATIENT
Start: 2024-02-23 | End: 2024-02-26 | Stop reason: HOSPADM

## 2024-02-23 RX ORDER — HYDROCHLOROTHIAZIDE 25 MG/1
25 TABLET ORAL DAILY
Status: DISCONTINUED | OUTPATIENT
Start: 2024-02-24 | End: 2024-02-24

## 2024-02-23 RX ORDER — SODIUM CHLORIDE 9 MG/ML
40 INJECTION, SOLUTION INTRAVENOUS AS NEEDED
Status: DISCONTINUED | OUTPATIENT
Start: 2024-02-23 | End: 2024-02-26 | Stop reason: HOSPADM

## 2024-02-23 RX ORDER — LISINOPRIL 20 MG/1
20 TABLET ORAL DAILY
Status: DISCONTINUED | OUTPATIENT
Start: 2024-02-24 | End: 2024-02-26 | Stop reason: HOSPADM

## 2024-02-23 RX ORDER — VANCOMYCIN/0.9 % SOD CHLORIDE 1.5G/250ML
20 PLASTIC BAG, INJECTION (ML) INTRAVENOUS ONCE
Status: COMPLETED | OUTPATIENT
Start: 2024-02-23 | End: 2024-02-23

## 2024-02-23 RX ORDER — SODIUM CHLORIDE 0.9 % (FLUSH) 0.9 %
10 SYRINGE (ML) INJECTION AS NEEDED
Status: DISCONTINUED | OUTPATIENT
Start: 2024-02-23 | End: 2024-02-26 | Stop reason: HOSPADM

## 2024-02-23 RX ORDER — CETIRIZINE HYDROCHLORIDE 10 MG/1
10 TABLET ORAL DAILY PRN
Status: DISCONTINUED | OUTPATIENT
Start: 2024-02-23 | End: 2024-02-26 | Stop reason: HOSPADM

## 2024-02-23 RX ORDER — MELOXICAM 15 MG/1
15 TABLET ORAL DAILY
Status: DISCONTINUED | OUTPATIENT
Start: 2024-02-24 | End: 2024-02-23

## 2024-02-23 RX ORDER — IBUPROFEN 400 MG/1
400 TABLET ORAL EVERY 4 HOURS PRN
Status: DISCONTINUED | OUTPATIENT
Start: 2024-02-23 | End: 2024-02-23

## 2024-02-23 RX ORDER — INSULIN LISPRO 100 [IU]/ML
3-14 INJECTION, SOLUTION INTRAVENOUS; SUBCUTANEOUS
Status: DISCONTINUED | OUTPATIENT
Start: 2024-02-23 | End: 2024-02-26 | Stop reason: HOSPADM

## 2024-02-23 RX ORDER — HYDROCHLOROTHIAZIDE 25 MG/1
25 TABLET ORAL DAILY
COMMUNITY
Start: 2024-01-29

## 2024-02-23 RX ORDER — SUMATRIPTAN 50 MG/1
50 TABLET, FILM COATED ORAL
Status: DISCONTINUED | OUTPATIENT
Start: 2024-02-23 | End: 2024-02-26 | Stop reason: HOSPADM

## 2024-02-23 RX ORDER — NICOTINE POLACRILEX 4 MG
15 LOZENGE BUCCAL
Status: DISCONTINUED | OUTPATIENT
Start: 2024-02-23 | End: 2024-02-26 | Stop reason: HOSPADM

## 2024-02-23 RX ORDER — BISACODYL 5 MG/1
5 TABLET, DELAYED RELEASE ORAL DAILY PRN
Status: DISCONTINUED | OUTPATIENT
Start: 2024-02-23 | End: 2024-02-26 | Stop reason: HOSPADM

## 2024-02-23 RX ORDER — PANTOPRAZOLE SODIUM 40 MG/1
40 TABLET, DELAYED RELEASE ORAL
Status: DISCONTINUED | OUTPATIENT
Start: 2024-02-24 | End: 2024-02-26 | Stop reason: HOSPADM

## 2024-02-23 RX ORDER — PAROXETINE HYDROCHLORIDE 20 MG/1
20 TABLET, FILM COATED ORAL EVERY MORNING
Status: DISCONTINUED | OUTPATIENT
Start: 2024-02-24 | End: 2024-02-26 | Stop reason: HOSPADM

## 2024-02-23 RX ORDER — VANCOMYCIN/0.9 % SOD CHLORIDE 1.5G/250ML
1500 PLASTIC BAG, INJECTION (ML) INTRAVENOUS EVERY 24 HOURS
Status: DISCONTINUED | OUTPATIENT
Start: 2024-02-24 | End: 2024-02-26 | Stop reason: HOSPADM

## 2024-02-23 RX ORDER — PAROXETINE HYDROCHLORIDE 20 MG/1
20 TABLET, FILM COATED ORAL EVERY MORNING
Status: ON HOLD | COMMUNITY
End: 2024-02-26

## 2024-02-23 RX ORDER — GABAPENTIN 300 MG/1
300 CAPSULE ORAL NIGHTLY PRN
COMMUNITY
Start: 2024-01-29

## 2024-02-23 RX ORDER — ROSUVASTATIN CALCIUM 10 MG/1
10 TABLET, COATED ORAL DAILY
Status: DISCONTINUED | OUTPATIENT
Start: 2024-02-24 | End: 2024-02-26 | Stop reason: HOSPADM

## 2024-02-23 RX ORDER — DEXTROSE MONOHYDRATE 25 G/50ML
25 INJECTION, SOLUTION INTRAVENOUS
Status: DISCONTINUED | OUTPATIENT
Start: 2024-02-23 | End: 2024-02-26 | Stop reason: HOSPADM

## 2024-02-23 RX ORDER — DULOXETIN HYDROCHLORIDE 60 MG/1
60 CAPSULE, DELAYED RELEASE ORAL DAILY
Status: DISCONTINUED | OUTPATIENT
Start: 2024-02-24 | End: 2024-02-26 | Stop reason: HOSPADM

## 2024-02-23 RX ADMIN — INSULIN LISPRO 5 UNITS: 100 INJECTION, SOLUTION INTRAVENOUS; SUBCUTANEOUS at 22:45

## 2024-02-23 RX ADMIN — VANCOMYCIN HYDROCHLORIDE 1500 MG: 10 INJECTION, POWDER, LYOPHILIZED, FOR SOLUTION INTRAVENOUS at 19:05

## 2024-02-23 RX ADMIN — INSULIN HUMAN 8 UNITS: 100 INJECTION, SOLUTION PARENTERAL at 18:08

## 2024-02-23 RX ADMIN — PIPERACILLIN SODIUM AND TAZOBACTAM SODIUM 3.38 G: 3; .375 INJECTION, SOLUTION INTRAVENOUS at 18:29

## 2024-02-23 RX ADMIN — SODIUM CHLORIDE 1000 ML: 9 INJECTION, SOLUTION INTRAVENOUS at 19:06

## 2024-02-23 NOTE — ED PROVIDER NOTES
MD ATTESTATION NOTE     SHARED VISIT: This visit was performed by BOTH a physician and an APC. The substantive portion of the medical decision making was performed by this attesting physician who made or approved the management plan and takes responsibility for patient management. All studies in the APC note (if performed) were independently interpreted by me.  The MG and I have discussed this patient's history, physical exam, and treatment plan. I have reviewed the documentation and personally had a face to face interaction with the patient. I affirm the documentation and agree with the treatment and plan. I provided a substantive portion of the care of the patient.  I personally performed the physical exam in its entirety, and below are my findings.      Brief HPI: Patient complains of a right toe wound for the past 3 weeks.  She initially had some redness in her foot as well but this resolved after a 10-day course of antibiotics.  She denies fever or chills.  She has a history of diabetes and peripheral neuropathy.  Denies history of peripheral vascular disease.    PHYSICAL EXAM  ED Triage Vitals   Temp Heart Rate Resp BP SpO2   02/23/24 1504 02/23/24 1504 02/23/24 1504 02/23/24 1513 02/23/24 1504   97.4 °F (36.3 °C) 116 18 144/82 98 %      Temp src Heart Rate Source Patient Position BP Location FiO2 (%)   -- -- -- -- --                GENERAL: Awake, alert, oriented x 3.  Nontoxic-appearing female.  Resting comfortably in no acute distress  HENT: nares patent  EYES: no scleral icterus  CV: regular rhythm, normal rate, equal pedal pulses bilaterally  RESPIRATORY: normal effort, clear to auscultation bilaterally  ABDOMEN: soft, nontender  MUSCULOSKELETAL: There is diffuse tenderness of the right great toe  NEURO: alert, moves all extremities, follows commands  PSYCH:  calm, cooperative  SKIN: There is erythema and warmth of the right great toe.  There is a linear wound on the plantar aspect of the left great toe.   No lymphangitis.    Vital signs and nursing notes reviewed.        Plan: Obtain labs and right foot x-rays.    Right foot x-ray personally interpreted by me.  My personal interpretation is: There is soft tissue swelling of the right great toe.  No fracture.  No dislocation.  No bony erosion.    MDM: Patient presented to ED with a right great toe wound for the past 3 weeks.  She is a diabetic.  Lactic acid and CRP were elevated.  X-ray did not show any evidence of osteomyelitis.  Glucose was 457.  Patient was given insulin and will be started on IV antibiotics.  She will be admitted.    ED Course as of 02/23/24 1936 Fri Feb 23, 2024 1735 Lactate(!!): 3.4 [KA]   1735 C-Reactive Protein(!): 0.51 [KA]   1735 WBC: 8.20 [KA]   1735 Hemoglobin: 13.3 [KA]   1748 Glucose(!!): 447 [KA]   1748 Creatinine: 0.92 [KA]   1804 IV fluids for the patient's hyperglycemia and lactic acidosis as well as a dose of IV insulin.  She has a normal white blood cell count and is afebrile but elevated lactate and CRP with obvious infection which has failed outpatient antibiotics.  I think in light of her persistent infection and severe hyperglycemia recommend admission for IV antibiotics, glycemic control.  She is agreeable. [KA]   1833 I discussed patient with Dr. Krishnan, hospitalist including history presentation workup and he agrees to admit for further evaluation and treatment. [KA]      ED Course User Index  [KA] Asia Wong PA-C Holland, William D, MD  02/23/24 1814       López Vo MD  02/23/24 1936

## 2024-02-23 NOTE — ED PROVIDER NOTES
EMERGENCY DEPARTMENT ENCOUNTER  Room Number:  03/03  PCP: Donato Vilcihs DO  Independent Historians: Patient      HPI:  Chief Complaint: had concerns including Wound Check.       A complete HPI/ROS/PMH/PSH/SH/FH are unobtainable due to: None    Chronic or social conditions impacting patient care (Social Determinants of Health): None      Context: Roxanna Beck is a 58 y.o. female with a medical history o insulin-dependentf diabetes, neuropathy who presents to the ED c/o acute right toe redness swelling and occasional pain.  She states that 3 weeks ago she was having similar symptoms and also redness in her foot, saw her PCP and was prescribed antibiotics for 10 days as well as a topical ointment.  She states the redness in her foot improved but the toe did not and it remains red and is getting progressively more red and swollen and the wound on the bottom of it has gotten bigger and more deep.  She denies any fevers chills nausea or vomiting.  She states her Lantus was recently increased and her blood sugar has been more controlled since.  She sees a PCP in [place] where she used to live.      Review of prior external notes (non-ED) -and- Review of prior external test results outside of this encounter:  Office visit 1/29/2024 [facility] primary care.  She was therefore annual exam, also noticed a wound to her toe with some redness and warmth and peeling, she was prescribed Keflex and Bactroban.  Insulin was adjusted.  Hemoglobin A1c that day was 13.8.        PAST MEDICAL HISTORY  Active Ambulatory Problems     Diagnosis Date Noted    Lumbar disc herniation 12/15/2016    Migraine 12/29/2021     Resolved Ambulatory Problems     Diagnosis Date Noted    No Resolved Ambulatory Problems     Past Medical History:   Diagnosis Date    Arthritis     Depression     Diabetes mellitus     Dizzy     Fibromyalgia     GERD (gastroesophageal reflux disease)     History of kidney stones     HNP (herniated nucleus  pulposus)     Hyperlipidemia     Hypertension     Wears glasses          PAST SURGICAL HISTORY  Past Surgical History:   Procedure Laterality Date    BACK SURGERY      L4-5 Discectomy    CARPAL TUNNEL RELEASE      left     SECTION      CHOLECYSTECTOMY      COLONOSCOPY      ENDOSCOPY      HYSTERECTOMY      KIDNEY STONE SURGERY      LUMBAR DISCECTOMY N/A 12/15/2016    Procedure: LUMBAR DISCECTOMY L3-4;  Surgeon: Sandro Cuenca MD;  Location: [facility];  Service:     OOPHORECTOMY      TONSILLECTOMY      WISDOM TOOTH EXTRACTION           FAMILY HISTORY  Family History   Problem Relation Age of Onset    Lung cancer Mother     Cancer Mother     Heart attack Father     Heart disease Father     Diabetes Sister     Hypertension Sister     Breast cancer Sister 70    Diabetes Brother     Hypertension Brother     Heart disease Paternal Grandmother     Heart disease Paternal Grandfather     Breast cancer Cousin         early 50's    Breast cancer Maternal Aunt         50's    Breast cancer Maternal Aunt         50's    Breast cancer Maternal Aunt         50's    Breast cancer Maternal Aunt         50's    Breast cancer Maternal Aunt         50's    Endometrial cancer Neg Hx     Ovarian cancer Neg Hx          SOCIAL HISTORY  Social History     Socioeconomic History    Marital status: Single    Number of children: 1    Years of education: High School   Tobacco Use    Smoking status: Never    Smokeless tobacco: Never   Substance and Sexual Activity    Alcohol use: No    Drug use: No    Sexual activity: Defer         ALLERGIES  Codeine, Percocet [oxycodone-acetaminophen], Adhesive tape, and Toradol [ketorolac tromethamine]      REVIEW OF SYSTEMS  Review of Systems  Included in HPI  All systems reviewed and negative except for those discussed in HPI.      PHYSICAL EXAM    I have reviewed the triage vital signs and nursing notes.    ED Triage Vitals   Temp Heart Rate Resp BP SpO2   24 1504 24 1504 24 1504  02/23/24 1513 02/23/24 1504   97.4 °F (36.3 °C) 116 18 144/82 98 %      Temp src Heart Rate Source Patient Position BP Location FiO2 (%)   -- -- -- -- --              Physical Exam  GENERAL: alert, no acute distress  SKIN: Warm, dry  HENT: Normocephalic, atraumatic  EYES: no scleral icterus  CV: regular rhythm, regular rate  RESPIRATORY: normal effort, lungs clear  ABDOMEN: nondistended nontender  MUSCULOSKELETAL: no deformity.  On inspection of the right lower extremity there is edema and erythema to the great toe.  There is a ulceration on the plantar aspect.  There is some mild tenderness though she has very decreased feeling in her feet and toes.  Cap refills brisk.  No lymphangitis.  NEURO: alert, moves all extremities, follows commands            LAB RESULTS  Recent Results (from the past 24 hour(s))   Comprehensive Metabolic Panel    Collection Time: 02/23/24  5:01 PM    Specimen: Blood   Result Value Ref Range    Glucose 447 (C) 65 - 99 mg/dL    BUN 33 (H) 6 - 20 mg/dL    Creatinine 0.92 0.57 - 1.00 mg/dL    Sodium 133 (L) 136 - 145 mmol/L    Potassium 5.1 3.5 - 5.2 mmol/L    Chloride 98 98 - 107 mmol/L    CO2 21.5 (L) 22.0 - 29.0 mmol/L    Calcium 10.0 8.6 - 10.5 mg/dL    Total Protein 6.9 6.0 - 8.5 g/dL    Albumin 4.0 3.5 - 5.2 g/dL    ALT (SGPT) 26 1 - 33 U/L    AST (SGOT) 14 1 - 32 U/L    Alkaline Phosphatase 148 (H) 39 - 117 U/L    Total Bilirubin 0.3 0.0 - 1.2 mg/dL    Globulin 2.9 gm/dL    A/G Ratio 1.4 g/dL    BUN/Creatinine Ratio 35.9 (H) 7.0 - 25.0    Anion Gap 13.5 5.0 - 15.0 mmol/L    eGFR 71.9 >60.0 mL/min/1.73   Lactic Acid, Plasma    Collection Time: 02/23/24  5:01 PM    Specimen: Blood   Result Value Ref Range    Lactate 3.4 (C) 0.5 - 2.0 mmol/L   Sedimentation Rate    Collection Time: 02/23/24  5:01 PM    Specimen: Blood   Result Value Ref Range    Sed Rate 20 0 - 30 mm/hr   C-reactive Protein    Collection Time: 02/23/24  5:01 PM    Specimen: Blood   Result Value Ref Range    C-Reactive  Protein 0.51 (H) 0.00 - 0.50 mg/dL   CBC Auto Differential    Collection Time: 02/23/24  5:01 PM    Specimen: Blood   Result Value Ref Range    WBC 8.20 3.40 - 10.80 10*3/mm3    RBC 4.31 3.77 - 5.28 10*6/mm3    Hemoglobin 13.3 12.0 - 15.9 g/dL    Hematocrit 39.2 34.0 - 46.6 %    MCV 91.0 79.0 - 97.0 fL    MCH 30.9 26.6 - 33.0 pg    MCHC 33.9 31.5 - 35.7 g/dL    RDW 11.9 (L) 12.3 - 15.4 %    RDW-SD 39.7 37.0 - 54.0 fl    MPV 9.9 6.0 - 12.0 fL    Platelets 205 140 - 450 10*3/mm3    Neutrophil % 68.5 42.7 - 76.0 %    Lymphocyte % 19.8 19.6 - 45.3 %    Monocyte % 8.7 5.0 - 12.0 %    Eosinophil % 2.3 0.3 - 6.2 %    Basophil % 0.5 0.0 - 1.5 %    Immature Grans % 0.2 0.0 - 0.5 %    Neutrophils, Absolute 5.62 1.70 - 7.00 10*3/mm3    Lymphocytes, Absolute 1.62 0.70 - 3.10 10*3/mm3    Monocytes, Absolute 0.71 0.10 - 0.90 10*3/mm3    Eosinophils, Absolute 0.19 0.00 - 0.40 10*3/mm3    Basophils, Absolute 0.04 0.00 - 0.20 10*3/mm3    Immature Grans, Absolute 0.02 0.00 - 0.05 10*3/mm3    nRBC 0.0 0.0 - 0.2 /100 WBC         RADIOLOGY  XR Foot 3+ View Right    Result Date: 2/23/2024  RIGHT FOOT: AP, LATERAL, OBLIQUE  HISTORY: Right great toe cellulitis. Ulcer.  COMPARISON: None.  FINDINGS: Midfoot alignment appears within normal limits.  There is no evidence for fracture or acute osseous abnormality. There is mild soft tissue swelling. Chronic calcification extends along the course of the proximal plantar fascia.  Heel spurs are present. There are mild arthritic changes at the 1st MTP joint.      Soft tissue swelling without radiographic evidence for osteomyelitis or acute osseous abnormality.   This report was finalized on 2/23/2024 4:56 PM by Dr. Heraclio Bustos M.D on Workstation: [id number]         MEDICATIONS GIVEN IN ER  Medications   sodium chloride 0.9 % bolus 1,000 mL (has no administration in time range)   piperacillin-tazobactam (ZOSYN) 3.375 g in iso-osmotic dextrose 50 ml (premix) (3.375 g Intravenous New Bag 2/23/24  1829)   vancomycin IVPB 1500 mg in 0.9% NaCl (Premix) 500 mL (has no administration in time range)   sodium chloride 0.9 % flush 10 mL (has no administration in time range)   sodium chloride 0.9 % flush 10 mL (has no administration in time range)   sodium chloride 0.9 % infusion 40 mL (has no administration in time range)   dextrose (GLUTOSE) oral gel 15 g (has no administration in time range)   dextrose (D50W) (25 g/50 mL) IV injection 25 g (has no administration in time range)   glucagon (GLUCAGEN) injection 1 mg (has no administration in time range)   Insulin Lispro (humaLOG) injection 3-14 Units (has no administration in time range)   nitroglycerin (NITROSTAT) SL tablet 0.4 mg (has no administration in time range)   sennosides-docusate (PERICOLACE) 8.6-50 MG per tablet 2 tablet (has no administration in time range)     And   polyethylene glycol (MIRALAX) packet 17 g (has no administration in time range)     And   bisacodyl (DULCOLAX) EC tablet 5 mg (has no administration in time range)     And   bisacodyl (DULCOLAX) suppository 10 mg (has no administration in time range)   Pharmacy to dose vancomycin (has no administration in time range)   piperacillin-tazobactam (ZOSYN) 3.375 g in iso-osmotic dextrose 50 ml (premix) (has no administration in time range)   insulin regular (humuLIN R,novoLIN R) injection 8 Units (8 Units Intravenous Given 2/23/24 1808)         ORDERS PLACED DURING THIS VISIT:  Orders Placed This Encounter   Procedures    Blood Culture - Blood,    Blood Culture - Blood,    Wound Culture - Swab, Toe, Right    XR Foot 3+ View Right    Comprehensive Metabolic Panel    Lactic Acid, Plasma    Sedimentation Rate    C-reactive Protein    CBC Auto Differential    STAT Lactic Acid, Reflex    CBC Auto Differential    Basic Metabolic Panel    Diet: Cardiac Diets, Diabetic Diets; Healthy Heart (2-3 Na+); Consistent Carbohydrate; Texture: Regular Texture (IDDSI 7); Fluid Consistency: Thin (IDDSI 0)    Vital  Signs    Intake & Output    Weigh Patient    Oral Care    Place Sequential Compression Device    Maintain Sequential Compression Device    Telemetry - Maintain IV Access    Telemetry - Place Orders & Notify Provider of Results When Patient Experiences Acute Chest Pain, Dysrhythmia or Respiratory Distress    May Be Off Telemetry for Tests    Pulse Oximetry, Continuous    Up With Assistance    Daily Weights    Neurovascular Checks    [facility] (on-call MD unless specified) Details    Inpatient Diabetes Educator Consult    POC Glucose 4x Daily Before Meals & at Bedtime    Insert Peripheral IV    Initiate Observation Status    CBC & Differential         OUTPATIENT MEDICATION MANAGEMENT:  Current Facility-Administered Medications Ordered in Epic   Medication Dose Route Frequency Provider Last Rate Last Admin    sennosides-docusate (PERICOLACE) 8.6-50 MG per tablet 2 tablet  2 tablet Oral BID PRN Awilda Gay APRN        And    polyethylene glycol (MIRALAX) packet 17 g  17 g Oral Daily PRN Awilda Gay APRN        And    bisacodyl (DULCOLAX) EC tablet 5 mg  5 mg Oral Daily PRN Awilda Gay APRN        And    bisacodyl (DULCOLAX) suppository 10 mg  10 mg Rectal Daily PRN Awilda Gay, HEIDI        dextrose (D50W) (25 g/50 mL) IV injection 25 g  25 g Intravenous Q15 Min PRN Awilda Gay APRN        dextrose (GLUTOSE) oral gel 15 g  15 g Oral Q15 Min PRN Awilda Gay, HEIDI        glucagon (GLUCAGEN) injection 1 mg  1 mg Intramuscular Q15 Min PRN Awilda Gay APRN        Insulin Lispro (humaLOG) injection 3-14 Units  3-14 Units Subcutaneous 4x Daily With Meals & Nightly Awilda Gay APRN        nitroglycerin (NITROSTAT) SL tablet 0.4 mg  0.4 mg Sublingual Q5 Min PRN Awilda Gay, HEIDI        Pharmacy to dose vancomycin   Does not apply Continuous PRN Awilda Gay APRN        piperacillin-tazobactam (ZOSYN) 3.375 g in  iso-osmotic dextrose 50 ml (premix)  3.375 g Intravenous Once Asia Wong PA-C   3.375 g at 02/23/24 1829    [START ON 2/24/2024] piperacillin-tazobactam (ZOSYN) 3.375 g in iso-osmotic dextrose 50 ml (premix)  3.375 g Intravenous Q8H Awilda Gayele, APRN        sodium chloride 0.9 % bolus 1,000 mL  1,000 mL Intravenous Once Asia Wong PA-C        sodium chloride 0.9 % flush 10 mL  10 mL Intravenous Q12H Gay, Awilda Yoonele, APRN        sodium chloride 0.9 % flush 10 mL  10 mL Intravenous PRN Awilda Gay, APRN        sodium chloride 0.9 % infusion 40 mL  40 mL Intravenous PRN Awilda Gay, APRN        vancomycin IVPB 1500 mg in 0.9% NaCl (Premix) 500 mL  20 mg/kg Intravenous Once Asia Wong PA-C         Current Outpatient Medications Ordered in Epic   Medication Sig Dispense Refill    amitriptyline (ELAVIL) 25 MG tablet Take 3 tablets by mouth Every Night. 90 tablet 0    azithromycin (ZITHROMAX) 250 MG tablet Take 2 tablets the first day, then 1 tablet daily for 4 days. 6 tablet 0    baclofen (LIORESAL) 10 MG tablet Take 1 tablet by mouth 2 (Two) Times a Day. 60 tablet 0    cetirizine (ZyrTEC) 10 MG tablet Take 10 mg by mouth Daily As Needed for allergies.      DULoxetine (CYMBALTA) 30 MG capsule Take 60 mg by mouth daily         glipiZIDE (GLUCOTROL) 10 MG tablet Take 10 mg by mouth 2 (Two) Times a Day Before Meals.      ibuprofen (ADVIL,MOTRIN) 600 MG tablet Take 1 tablet by mouth Every 8 (Eight) Hours As Needed for Moderate Pain . 21 tablet 0    Insulin Glargine-Lixisenatide (Soliqua) 100-33 UNT-MCG/ML solution pen-injector injection Inject 50 Units under the skin into the appropriate area as directed Daily.      lisinopril (PRINIVIL,ZESTRIL) 10 MG tablet Take 20 mg by mouth Daily.      meloxicam (MOBIC) 15 MG tablet Take 15 mg by mouth Daily.      METFORMIN HCL PO Take 1,000 mg by mouth 2 (two) times a day         methylPREDNISolone (MEDROL) 4 MG dose pack Take as  directed on package instructions. 21 each 0    omeprazole (PriLOSEC) 40 MG capsule Take 40 mg by mouth daily.      rosuvastatin (CRESTOR) 10 MG tablet Take 10 mg by mouth daily.      SUMAtriptan (Imitrex) 50 MG tablet Take one tablet at onset of headache. May repeat dose one time in 2 hours if headache not relieved. 9 tablet 0         PROCEDURES  Procedures            PROGRESS, DATA ANALYSIS, CONSULTS, AND MEDICAL DECISION MAKING  All labs have been independently interpreted by me.  All radiology studies have been reviewed by me. All EKG's have been independently viewed and interpreted by me.  Discussion below represents my analysis of pertinent findings related to patient's condition, differential diagnosis, treatment plan and final disposition.    DIFFERENTIAL    Differential diagnosis includes but is not limited to cellulitis, osteomyelitis, ulcer, sepsis, hyperglycemia      ED Course as of 02/23/24 1834 Fri Feb 23, 2024   1735 Lactate(!!): 3.4 [KA]   1735 C-Reactive Protein(!): 0.51 [KA]   1735 WBC: 8.20 [KA]   1735 Hemoglobin: 13.3 [KA]   1748 Glucose(!!): 447 [KA]   1748 Creatinine: 0.92 [KA]   1804 IV fluids for the patient's hyperglycemia and lactic acidosis as well as a dose of IV insulin.  She has a normal white blood cell count and is afebrile but elevated lactate and CRP with obvious infection which has failed outpatient antibiotics.  I think in light of her persistent infection and severe hyperglycemia recommend admission for IV antibiotics, glycemic control.  She is agreeable. [KA]   1833 I discussed patient with Dr. Krishnan, hospitalist including history presentation workup and he agrees to admit for further evaluation and treatment. [KA]      ED Course User Index  [KA] Asia Wong PA-C             AS OF 18:34 EST VITALS:    BP - 144/82  HR - 93  TEMP - 97.4 °F (36.3 °C)  O2 SATS - 96%    COMPLEXITY OF CARE  The patient requires admission.      DIAGNOSIS  Final diagnoses:   Diabetic foot  infection   Type 2 diabetes mellitus with hyperglycemia, with long-term current use of insulin   Lactic acidosis         DISPOSITION  ED Disposition       ED Disposition   Decision to Admit    Condition   --    Comment   Level of Care: Telemetry [5]   Diagnosis: Hyperglycemia [911651]   Admitting Physician: DESTINY PINA [[id number]]   Attending Physician: DESTINY PINA [[id number]]                    FOLLOW UP  No follow-up provider specified.            Please note that portions of this document were completed with a voice recognition program.    Note Disclaimer: At [facility], we believe that sharing information builds trust and better relationships. You are receiving this note because you recently visited [facility]. It is possible you will see health information before a provider has talked with you about it. This kind of information can be easy to misunderstand. To help you fully understand what it means for your health, we urge you to discuss this note with your provider.         Asia Wong PA-C  02/23/24 1392

## 2024-02-23 NOTE — ED NOTES
Nursing report ED to floor  Roxanna Beck  58 y.o.  female    HPI :   HPI (Adult)  Stated Reason for Visit: wound check    Chief Complaint  Chief Complaint   Patient presents with    Wound Check       Admitting doctor:   Alejandro Krishnan MD    Admitting diagnosis:   The primary encounter diagnosis was Diabetic foot infection. Diagnoses of Type 2 diabetes mellitus with hyperglycemia, with long-term current use of insulin and Lactic acidosis were also pertinent to this visit.    Code status:   Current Code Status       Date Active Code Status Order ID Comments User Context       Prior            Allergies:   Codeine, Percocet [oxycodone-acetaminophen], Adhesive tape, and Toradol [ketorolac tromethamine]    Isolation:   No active isolations    Intake and Output  No intake or output data in the 24 hours ending 02/23/24 1855    Weight:       02/23/24  1740   Weight: 77.9 kg (171 lb 11.8 oz)       Most recent vitals:   Vitals:    02/23/24 1609 02/23/24 1740 02/23/24 1742 02/23/24 1849   BP:       Pulse: 100  93 92   Resp:       Temp:       SpO2: 95%  96% 96%   Weight:  77.9 kg (171 lb 11.8 oz)         Active LDAs/IV Access:   Lines, Drains & Airways       Active LDAs       Name Placement date Placement time Site Days    Peripheral IV 02/23/24 1701 Anterior;Left Forearm 02/23/24  1701  Forearm  less than 1                    Labs (abnormal labs have a star):   Labs Reviewed   COMPREHENSIVE METABOLIC PANEL - Abnormal; Notable for the following components:       Result Value    Glucose 447 (*)     BUN 33 (*)     Sodium 133 (*)     CO2 21.5 (*)     Alkaline Phosphatase 148 (*)     BUN/Creatinine Ratio 35.9 (*)     All other components within normal limits    Narrative:     GFR Normal >60  Chronic Kidney Disease <60  Kidney Failure <15     LACTIC ACID, PLASMA - Abnormal; Notable for the following components:    Lactate 3.4 (*)     All other components within normal limits   C-REACTIVE PROTEIN - Abnormal; Notable for the  following components:    C-Reactive Protein 0.51 (*)     All other components within normal limits   CBC WITH AUTO DIFFERENTIAL - Abnormal; Notable for the following components:    RDW 11.9 (*)     All other components within normal limits   SEDIMENTATION RATE - Normal   BLOOD CULTURE   BLOOD CULTURE   WOUND CULTURE   LACTIC ACID, REFLEX   POCT GLUCOSE FINGERSTICK   POCT GLUCOSE FINGERSTICK   POCT GLUCOSE FINGERSTICK   POCT GLUCOSE FINGERSTICK   CBC AND DIFFERENTIAL    Narrative:     The following orders were created for panel order CBC & Differential.  Procedure                               Abnormality         Status                     ---------                               -----------         ------                     CBC Auto Differential[[id number]]        Abnormal            Final result                 Please view results for these tests on the individual orders.       EKG:   No orders to display       Meds given in ED:   Medications   sodium chloride 0.9 % bolus 1,000 mL (has no administration in time range)   piperacillin-tazobactam (ZOSYN) 3.375 g in iso-osmotic dextrose 50 ml (premix) (3.375 g Intravenous New Bag 2/23/24 9589)   vancomycin IVPB 1500 mg in 0.9% NaCl (Premix) 500 mL (has no administration in time range)   sodium chloride 0.9 % flush 10 mL (has no administration in time range)   sodium chloride 0.9 % flush 10 mL (has no administration in time range)   sodium chloride 0.9 % infusion 40 mL (has no administration in time range)   dextrose (GLUTOSE) oral gel 15 g (has no administration in time range)   dextrose (D50W) (25 g/50 mL) IV injection 25 g (has no administration in time range)   glucagon (GLUCAGEN) injection 1 mg (has no administration in time range)   Insulin Lispro (humaLOG) injection 3-14 Units (has no administration in time range)   nitroglycerin (NITROSTAT) SL tablet 0.4 mg (has no administration in time range)   sennosides-docusate (PERICOLACE) 8.6-50 MG per tablet 2 tablet (has  no administration in time range)     And   polyethylene glycol (MIRALAX) packet 17 g (has no administration in time range)     And   bisacodyl (DULCOLAX) EC tablet 5 mg (has no administration in time range)     And   bisacodyl (DULCOLAX) suppository 10 mg (has no administration in time range)   Pharmacy to dose vancomycin (has no administration in time range)   piperacillin-tazobactam (ZOSYN) 3.375 g in iso-osmotic dextrose 50 ml (premix) (has no administration in time range)   insulin regular (humuLIN R,novoLIN R) injection 8 Units (8 Units Intravenous Given 2/23/24 1808)       Imaging results:  XR Foot 3+ View Right    Result Date: 2/23/2024  Soft tissue swelling without radiographic evidence for osteomyelitis or acute osseous abnormality.   This report was finalized on 2/23/2024 4:56 PM by Dr. Heraclio Bustos M.D on Workstation: [id number]       Ambulatory status:   Partial assist    Social issues:   Social History     Socioeconomic History    Marital status: Single    Number of children: 1    Years of education: High School   Tobacco Use    Smoking status: Never    Smokeless tobacco: Never   Substance and Sexual Activity    Alcohol use: No    Drug use: No    Sexual activity: Defer       NIH Stroke Scale:       Jessica Broderick RN  02/23/24 18:55 EST

## 2024-02-23 NOTE — ED NOTES
Patient to ER via car from home for wound on R big toe x 2 weeks    Patient is being treated with abx without improvement    Patient is known diabetic

## 2024-02-24 LAB
ANION GAP SERPL CALCULATED.3IONS-SCNC: 11 MMOL/L (ref 5–15)
BASOPHILS # BLD AUTO: 0.03 10*3/MM3 (ref 0–0.2)
BASOPHILS NFR BLD AUTO: 0.4 % (ref 0–1.5)
BUN SERPL-MCNC: 29 MG/DL (ref 6–20)
BUN/CREAT SERPL: 31.9 (ref 7–25)
CALCIUM SPEC-SCNC: 9 MG/DL (ref 8.6–10.5)
CHLORIDE SERPL-SCNC: 104 MMOL/L (ref 98–107)
CO2 SERPL-SCNC: 22 MMOL/L (ref 22–29)
CREAT SERPL-MCNC: 0.91 MG/DL (ref 0.57–1)
D-LACTATE SERPL-SCNC: 1.8 MMOL/L (ref 0.5–2)
D-LACTATE SERPL-SCNC: 2.5 MMOL/L (ref 0.5–2)
DEPRECATED RDW RBC AUTO: 39.5 FL (ref 37–54)
EGFRCR SERPLBLD CKD-EPI 2021: 72.8 ML/MIN/1.73
EOSINOPHIL # BLD AUTO: 0.19 10*3/MM3 (ref 0–0.4)
EOSINOPHIL NFR BLD AUTO: 2.8 % (ref 0.3–6.2)
ERYTHROCYTE [DISTWIDTH] IN BLOOD BY AUTOMATED COUNT: 12.2 % (ref 12.3–15.4)
GLUCOSE BLDC GLUCOMTR-MCNC: 247 MG/DL (ref 70–130)
GLUCOSE BLDC GLUCOMTR-MCNC: 250 MG/DL (ref 70–130)
GLUCOSE BLDC GLUCOMTR-MCNC: 268 MG/DL (ref 70–130)
GLUCOSE BLDC GLUCOMTR-MCNC: 312 MG/DL (ref 70–130)
GLUCOSE SERPL-MCNC: 294 MG/DL (ref 65–99)
HCT VFR BLD AUTO: 38 % (ref 34–46.6)
HGB BLD-MCNC: 12.8 G/DL (ref 12–15.9)
IMM GRANULOCYTES # BLD AUTO: 0.02 10*3/MM3 (ref 0–0.05)
IMM GRANULOCYTES NFR BLD AUTO: 0.3 % (ref 0–0.5)
LYMPHOCYTES # BLD AUTO: 1.98 10*3/MM3 (ref 0.7–3.1)
LYMPHOCYTES NFR BLD AUTO: 28.9 % (ref 19.6–45.3)
MCH RBC QN AUTO: 30.5 PG (ref 26.6–33)
MCHC RBC AUTO-ENTMCNC: 33.7 G/DL (ref 31.5–35.7)
MCV RBC AUTO: 90.7 FL (ref 79–97)
MONOCYTES # BLD AUTO: 0.62 10*3/MM3 (ref 0.1–0.9)
MONOCYTES NFR BLD AUTO: 9 % (ref 5–12)
NEUTROPHILS NFR BLD AUTO: 4.02 10*3/MM3 (ref 1.7–7)
NEUTROPHILS NFR BLD AUTO: 58.6 % (ref 42.7–76)
NRBC BLD AUTO-RTO: 0 /100 WBC (ref 0–0.2)
PLATELET # BLD AUTO: 195 10*3/MM3 (ref 140–450)
PMV BLD AUTO: 10.4 FL (ref 6–12)
POTASSIUM SERPL-SCNC: 3.9 MMOL/L (ref 3.5–5.2)
RBC # BLD AUTO: 4.19 10*6/MM3 (ref 3.77–5.28)
SODIUM SERPL-SCNC: 137 MMOL/L (ref 136–145)
WBC NRBC COR # BLD AUTO: 6.86 10*3/MM3 (ref 3.4–10.8)

## 2024-02-24 PROCEDURE — 25010000002 VANCOMYCIN 10 G RECONSTITUTED SOLUTION: Performed by: NURSE PRACTITIONER

## 2024-02-24 PROCEDURE — 83605 ASSAY OF LACTIC ACID: CPT | Performed by: PHYSICIAN ASSISTANT

## 2024-02-24 PROCEDURE — 25810000003 LACTATED RINGERS PER 1000 ML: Performed by: INTERNAL MEDICINE

## 2024-02-24 PROCEDURE — G0378 HOSPITAL OBSERVATION PER HR: HCPCS

## 2024-02-24 PROCEDURE — 85025 COMPLETE CBC W/AUTO DIFF WBC: CPT | Performed by: NURSE PRACTITIONER

## 2024-02-24 PROCEDURE — 25010000002 PIPERACILLIN SOD-TAZOBACTAM PER 1 G: Performed by: NURSE PRACTITIONER

## 2024-02-24 PROCEDURE — 63710000001 INSULIN GLARGINE PER 5 UNITS: Performed by: NURSE PRACTITIONER

## 2024-02-24 PROCEDURE — 82948 REAGENT STRIP/BLOOD GLUCOSE: CPT

## 2024-02-24 PROCEDURE — 63710000001 INSULIN LISPRO (HUMAN) PER 5 UNITS: Performed by: NURSE PRACTITIONER

## 2024-02-24 PROCEDURE — 63710000001 INSULIN LISPRO (HUMAN) PER 5 UNITS: Performed by: INTERNAL MEDICINE

## 2024-02-24 PROCEDURE — 80048 BASIC METABOLIC PNL TOTAL CA: CPT | Performed by: NURSE PRACTITIONER

## 2024-02-24 PROCEDURE — 25810000003 SODIUM CHLORIDE 0.9 % SOLUTION: Performed by: NURSE PRACTITIONER

## 2024-02-24 RX ORDER — SODIUM CHLORIDE, SODIUM LACTATE, POTASSIUM CHLORIDE, CALCIUM CHLORIDE 600; 310; 30; 20 MG/100ML; MG/100ML; MG/100ML; MG/100ML
75 INJECTION, SOLUTION INTRAVENOUS CONTINUOUS
Status: DISCONTINUED | OUTPATIENT
Start: 2024-02-24 | End: 2024-02-25

## 2024-02-24 RX ORDER — INSULIN LISPRO 100 [IU]/ML
8 INJECTION, SOLUTION INTRAVENOUS; SUBCUTANEOUS
Status: DISCONTINUED | OUTPATIENT
Start: 2024-02-24 | End: 2024-02-25

## 2024-02-24 RX ADMIN — INSULIN GLARGINE 50 UNITS: 100 INJECTION, SOLUTION SUBCUTANEOUS at 21:32

## 2024-02-24 RX ADMIN — PAROXETINE HYDROCHLORIDE HEMIHYDRATE 20 MG: 20 TABLET, FILM COATED ORAL at 08:22

## 2024-02-24 RX ADMIN — PIPERACILLIN SODIUM AND TAZOBACTAM SODIUM 3.38 G: 3; .375 INJECTION, SOLUTION INTRAVENOUS at 17:03

## 2024-02-24 RX ADMIN — AMITRIPTYLINE HYDROCHLORIDE 75 MG: 50 TABLET, FILM COATED ORAL at 00:47

## 2024-02-24 RX ADMIN — LISINOPRIL 20 MG: 20 TABLET ORAL at 08:22

## 2024-02-24 RX ADMIN — AMITRIPTYLINE HYDROCHLORIDE 75 MG: 50 TABLET, FILM COATED ORAL at 19:28

## 2024-02-24 RX ADMIN — INSULIN LISPRO 10 UNITS: 100 INJECTION, SOLUTION INTRAVENOUS; SUBCUTANEOUS at 21:32

## 2024-02-24 RX ADMIN — GABAPENTIN 300 MG: 300 CAPSULE ORAL at 20:38

## 2024-02-24 RX ADMIN — DULOXETINE HYDROCHLORIDE 60 MG: 60 CAPSULE, DELAYED RELEASE ORAL at 08:23

## 2024-02-24 RX ADMIN — BACLOFEN 10 MG: 10 TABLET ORAL at 00:47

## 2024-02-24 RX ADMIN — INSULIN GLARGINE 50 UNITS: 100 INJECTION, SOLUTION SUBCUTANEOUS at 00:47

## 2024-02-24 RX ADMIN — ROSUVASTATIN CALCIUM 10 MG: 10 TABLET, FILM COATED ORAL at 08:23

## 2024-02-24 RX ADMIN — INSULIN LISPRO 5 UNITS: 100 INJECTION, SOLUTION INTRAVENOUS; SUBCUTANEOUS at 11:18

## 2024-02-24 RX ADMIN — BACLOFEN 10 MG: 10 TABLET ORAL at 19:28

## 2024-02-24 RX ADMIN — INSULIN LISPRO 8 UNITS: 100 INJECTION, SOLUTION INTRAVENOUS; SUBCUTANEOUS at 17:02

## 2024-02-24 RX ADMIN — VANCOMYCIN HYDROCHLORIDE 1500 MG: 10 INJECTION, POWDER, LYOPHILIZED, FOR SOLUTION INTRAVENOUS at 14:55

## 2024-02-24 RX ADMIN — BACLOFEN 10 MG: 10 TABLET ORAL at 08:23

## 2024-02-24 RX ADMIN — SODIUM CHLORIDE, POTASSIUM CHLORIDE, SODIUM LACTATE AND CALCIUM CHLORIDE 150 ML/HR: 600; 310; 30; 20 INJECTION, SOLUTION INTRAVENOUS at 05:03

## 2024-02-24 RX ADMIN — INSULIN LISPRO 8 UNITS: 100 INJECTION, SOLUTION INTRAVENOUS; SUBCUTANEOUS at 08:21

## 2024-02-24 RX ADMIN — Medication 10 ML: at 08:31

## 2024-02-24 RX ADMIN — PIPERACILLIN SODIUM AND TAZOBACTAM SODIUM 3.38 G: 3; .375 INJECTION, SOLUTION INTRAVENOUS at 00:47

## 2024-02-24 RX ADMIN — PIPERACILLIN SODIUM AND TAZOBACTAM SODIUM 3.38 G: 3; .375 INJECTION, SOLUTION INTRAVENOUS at 08:21

## 2024-02-24 RX ADMIN — SUMATRIPTAN SUCCINATE 50 MG: 50 TABLET ORAL at 20:38

## 2024-02-24 RX ADMIN — PANTOPRAZOLE SODIUM 40 MG: 40 TABLET, DELAYED RELEASE ORAL at 05:03

## 2024-02-24 NOTE — PLAN OF CARE
Goal Outcome Evaluation:  Plan of Care Reviewed With: patient        Progress: no change  Outcome Evaluation: Wound culture from right great toe pending. Pt rec lactated ringers at 150/ hour, zosyn, and vanc. Trending vanc. No c/o pain.

## 2024-02-24 NOTE — H&P
Patient Name:  Roxanna Beck  YOB: 1965  MRN:  [id number]  Admit Date:  2/23/2024  Patient Care Team:  Donato Vilchis DO as PCP - General (Family Medicine)  Donato Vilchis DO as Consulting Physician (Family Medicine)  Jefferson Monk MD as Consulting Physician (Neurosurgery)  Asia Roger PA as Referring Physician (Family Medicine)  Donato Vilchis DO as Consulting Physician (Family Medicine)      Subjective   History Present Illness     Chief Complaint   Patient presents with    Wound Check     History of Present Illness  Ms. Beck is a 58 y o female with PMH of uncontrolled DM, GERD, HLD, HTN, and depression who presents to [facility] today with complaints of right toe redness, swelling and pain. She does endorse seeing her PCP three weeks ago who prescribed her keflex and bactroban without significant improvement. She states the area started as a large corn that has opened and intermittently draining, She states the redness has improved since starting on the Keflex it was initially covering the entire dorsal portion of her left foot.  It is now just at the base of her great toe. She does also endorse recent changes to her insulin with the addition of Lantus that has  improved her blood glucose levels. She denies any acute acute distress chest pain, shortness of breath, palpitations, lightheadedness, dizziness, fever, chills, cough, abdominal pain, nausea, vomiting, diarrhea, constipation, or  symptoms.    On exam she is awake alert and oriented x 3 in no acute distress, lungs are CTA, heart rate and rhythm are regular, normal, with +2 palpable peripheral pulses, no edema, soft, round, nondistended, nontender with audible bowel sounds throughout.  Noted open wound on great toe of left foot.  There is no drainage appreciated, the wound bed is dry, and black, tissue surrounding the would I dry, thick, rolled, and callused.     Initial evaluation in the  emergency room today reveals she is hemodynamically stable blood pressure 144/82, heart rate 87, respiratory rate 18, oxygen saturation 96% on room air, she is afebrile with a Tmax of 97.4  She is hyperglycemic with a glucose of 447, she has elevated BUN 33, CRP 0.51, lactate is 3.4 x-ray right foot shows soft tissue swelling without evidence of osteomyelitis or acute osseous abnormalities.  Blood cultures and wound cultures are pending.  She was initiated on IV Zosyn, vancomycin, 1 L normal saline, and 8 units of IV regular insulin.    Ms. Beck will be admitted for further evaluation and treatment of great toe infection that is resistant to antibiotics and other acute and or chronic conditions as needed.  Review of Systems   Constitutional:  Positive for activity change.   Eyes: Negative.    Respiratory: Negative.     Cardiovascular: Negative.    Gastrointestinal: Negative.    Endocrine: Negative.    Genitourinary: Negative.    Musculoskeletal: Negative.    Skin:  Positive for color change and wound.   Allergic/Immunologic: Negative.    Neurological: Negative.    Hematological: Negative.    Psychiatric/Behavioral: Negative.     All other systems reviewed and are negative.       Personal History     Past Medical History:   Diagnosis Date    Arthritis     Depression     Diabetes mellitus     checks sugar once per day     Dizzy     Fibromyalgia     GERD (gastroesophageal reflux disease)     History of kidney stones     HNP (herniated nucleus pulposus)     Hyperlipidemia     Hypertension     Wears glasses     readers     Past Surgical History:   Procedure Laterality Date    BACK SURGERY      L4-5 Discectomy    CARPAL TUNNEL RELEASE      left     SECTION      CHOLECYSTECTOMY      COLONOSCOPY      ENDOSCOPY      HYSTERECTOMY      KIDNEY STONE SURGERY      LUMBAR DISCECTOMY N/A 12/15/2016    Procedure: LUMBAR DISCECTOMY L3-4;  Surgeon: Sandro Cuenca MD;  Location: [facility];  Service:     OOPHORECTOMY       TONSILLECTOMY      WISDOM TOOTH EXTRACTION       Family History   Problem Relation Age of Onset    Lung cancer Mother     Cancer Mother     Heart attack Father     Heart disease Father     Diabetes Sister     Hypertension Sister     Breast cancer Sister 70    Diabetes Brother     Hypertension Brother     Heart disease Paternal Grandmother     Heart disease Paternal Grandfather     Breast cancer Cousin         early 50's    Breast cancer Maternal Aunt         50's    Breast cancer Maternal Aunt         50's    Breast cancer Maternal Aunt         50's    Breast cancer Maternal Aunt         50's    Breast cancer Maternal Aunt         50's    Endometrial cancer Neg Hx     Ovarian cancer Neg Hx      Social History     Tobacco Use    Smoking status: Never    Smokeless tobacco: Never   Vaping Use    Vaping Use: Never used   Substance Use Topics    Alcohol use: No    Drug use: No     No current facility-administered medications on file prior to encounter.     Current Outpatient Medications on File Prior to Encounter   Medication Sig Dispense Refill    amitriptyline (ELAVIL) 25 MG tablet Take 3 tablets by mouth Every Night. 90 tablet 0    baclofen (LIORESAL) 10 MG tablet Take 1 tablet by mouth 2 (Two) Times a Day. 60 tablet 0    cetirizine (ZyrTEC) 10 MG tablet Take 1 tablet by mouth Daily As Needed for Allergies.      DULoxetine (CYMBALTA) 30 MG capsule Take 2 capsules by mouth Daily.      gabapentin (NEURONTIN) 300 MG capsule Take 1 capsule by mouth At Night As Needed (For pain).      glipiZIDE (GLUCOTROL) 10 MG tablet Take 1 tablet by mouth 2 (Two) Times a Day Before Meals.      hydroCHLOROthiazide 25 MG tablet Take 1 tablet by mouth Daily.      ibuprofen (ADVIL,MOTRIN) 600 MG tablet Take 1 tablet by mouth Every 8 (Eight) Hours As Needed for Moderate Pain . 21 tablet 0    Insulin Glargine-Lixisenatide (Soliqua) 100-33 UNT-MCG/ML solution pen-injector injection Inject 50 Units under the skin into the appropriate area as  "directed Daily.      lisinopril (PRINIVIL,ZESTRIL) 10 MG tablet Take 2 tablets by mouth Daily.      meloxicam (MOBIC) 15 MG tablet Take 1 tablet by mouth Daily.      METFORMIN HCL PO Take 1,000 mg by mouth 2 (two) times a day         omeprazole (PriLOSEC) 40 MG capsule Take 1 capsule by mouth Daily.      rosuvastatin (CRESTOR) 10 MG tablet Take 1 tablet by mouth Daily.      azithromycin (ZITHROMAX) 250 MG tablet Take 2 tablets the first day, then 1 tablet daily for 4 days. 6 tablet 0    methylPREDNISolone (MEDROL) 4 MG dose pack Take as directed on package instructions. 21 each 0    PARoxetine (PAXIL) 20 MG tablet Take 1 tablet by mouth Every Morning.      SUMAtriptan (Imitrex) 50 MG tablet Take one tablet at onset of headache. May repeat dose one time in 2 hours if headache not relieved. 9 tablet 0     Allergies   Allergen Reactions    Codeine Nausea And Vomiting    Percocet [Oxycodone-Acetaminophen] Nausea And Vomiting     Caused bad pain    Adhesive Tape Rash    Toradol [Ketorolac Tromethamine] Other (See Comments)     \"doesnt do anything\"   - DOESN'T WORK PER PATIENT       Objective    Objective     Vital Signs  Temp:  [97.4 °F (36.3 °C)-98.5 °F (36.9 °C)] 98.5 °F (36.9 °C)  Heart Rate:  [] 87  Resp:  [18] 18  BP: (144-170)/(82-94) 170/94  SpO2:  [95 %-98 %] 96 %  on   ;      Body mass index is 29.93 kg/m².    Physical Exam  Vitals and nursing note reviewed.   Constitutional:       General: She is awake.      Appearance: Normal appearance.   Skin:     General: Skin is warm and dry.      Capillary Refill: Capillary refill takes less than 2 seconds.      Findings: Erythema, lesion and wound present.          Neurological:      Mental Status: She is alert.   Psychiatric:         Behavior: Behavior is cooperative.         Results Review:  I reviewed the patient's new clinical results.  I reviewed the patient's new imaging results and agree with the interpretation.  I reviewed the patient's other test results " and agree with the interpretation  I personally viewed and interpreted the patient's EKG/Telemetry data  Discussed with ED provider.    Lab Results (last 24 hours)       Procedure Component Value Units Date/Time    CBC & Differential [[id number]]  (Abnormal) Collected: 02/23/24 1701    Specimen: Blood Updated: 02/23/24 1713    Narrative:      The following orders were created for panel order CBC & Differential.  Procedure                               Abnormality         Status                     ---------                               -----------         ------                     CBC Auto Differential[[id number]]        Abnormal            Final result                 Please view results for these tests on the individual orders.    Comprehensive Metabolic Panel [[id number]]  (Abnormal) Collected: 02/23/24 1701    Specimen: Blood Updated: 02/23/24 1740     Glucose 447 mg/dL      BUN 33 mg/dL      Creatinine 0.92 mg/dL      Sodium 133 mmol/L      Potassium 5.1 mmol/L      Chloride 98 mmol/L      CO2 21.5 mmol/L      Calcium 10.0 mg/dL      Total Protein 6.9 g/dL      Albumin 4.0 g/dL      ALT (SGPT) 26 U/L      AST (SGOT) 14 U/L      Alkaline Phosphatase 148 U/L      Total Bilirubin 0.3 mg/dL      Globulin 2.9 gm/dL      A/G Ratio 1.4 g/dL      BUN/Creatinine Ratio 35.9     Anion Gap 13.5 mmol/L      eGFR 71.9 mL/min/1.73     Narrative:      GFR Normal >60  Chronic Kidney Disease <60  Kidney Failure <15      Lactic Acid, Plasma [[id number]]  (Abnormal) Collected: 02/23/24 1701    Specimen: Blood Updated: 02/23/24 1728     Lactate 3.4 mmol/L     Sedimentation Rate [[id number]]  (Normal) Collected: 02/23/24 1701    Specimen: Blood Updated: 02/23/24 1736     Sed Rate 20 mm/hr     C-reactive Protein [[id number]]  (Abnormal) Collected: 02/23/24 1701    Specimen: Blood Updated: 02/23/24 1730     C-Reactive Protein 0.51 mg/dL     CBC Auto Differential [[id number]]  (Abnormal) Collected: 02/23/24 1701    Specimen: Blood Updated:  02/23/24 1713     WBC 8.20 10*3/mm3      RBC 4.31 10*6/mm3      Hemoglobin 13.3 g/dL      Hematocrit 39.2 %      MCV 91.0 fL      MCH 30.9 pg      MCHC 33.9 g/dL      RDW 11.9 %      RDW-SD 39.7 fl      MPV 9.9 fL      Platelets 205 10*3/mm3      Neutrophil % 68.5 %      Lymphocyte % 19.8 %      Monocyte % 8.7 %      Eosinophil % 2.3 %      Basophil % 0.5 %      Immature Grans % 0.2 %      Neutrophils, Absolute 5.62 10*3/mm3      Lymphocytes, Absolute 1.62 10*3/mm3      Monocytes, Absolute 0.71 10*3/mm3      Eosinophils, Absolute 0.19 10*3/mm3      Basophils, Absolute 0.04 10*3/mm3      Immature Grans, Absolute 0.02 10*3/mm3      nRBC 0.0 /100 WBC     Blood Culture - Blood, Arm, Left [[id number]] Collected: 02/23/24 1816    Specimen: Blood from Arm, Left Updated: 02/23/24 1822    Blood Culture - Blood, Arm, Left [[id number]] Collected: 02/23/24 1816    Specimen: Blood from Arm, Left Updated: 02/23/24 1822    Wound Culture - Swab, Toe, Right [[id number]] Collected: 02/23/24 2041    Specimen: Swab from Toe, Right Updated: 02/23/24 2047    STAT Lactic Acid, Reflex [[id number]]  (Abnormal) Collected: 02/23/24 2056    Specimen: Blood Updated: 02/23/24 2149     Lactate 2.1 mmol/L             Imaging Results (Last 24 Hours)       Procedure Component Value Units Date/Time    XR Foot 3+ View Right [[id number]] Collected: 02/23/24 1656     Updated: 02/23/24 1659    Narrative:      RIGHT FOOT: AP, LATERAL, OBLIQUE     HISTORY: Right great toe cellulitis. Ulcer.     COMPARISON: None.     FINDINGS: Midfoot alignment appears within normal limits.  There is no  evidence for fracture or acute osseous abnormality. There is mild soft  tissue swelling. Chronic calcification extends along the course of the  proximal plantar fascia.  Heel spurs are present. There are mild  arthritic changes at the 1st MTP joint.        Impression:      Soft tissue swelling without radiographic evidence for  osteomyelitis or acute osseous abnormality.         This report was finalized on 2/23/2024 4:56 PM by Dr. Heraclio Bustos M.D on Workstation: [id number]                   No orders to display        Assessment/Plan     Active Hospital Problems    Diagnosis  POA    **Hyperglycemia [R73.9]  Yes    Diabetic ulcer of right great toe [E11.621, L97.519]  Yes    Lactic acidosis [E87.20]  Yes    Insulin dependent type 2 diabetes mellitus [E11.9, Z79.4]  Not Applicable    Generalized anxiety disorder [F41.1]  Yes    Diabetic peripheral neuropathy [E11.42]  Yes      Resolved Hospital Problems   No resolved problems to display.     Hyperglycemia  Insulin dependent type 2 diabetes mellitus  Chronic   447 baseline  Accu-Cheks ACHS  SSI ordered for hyperglycemia  Hypoglycemia treatment per protocol  Continue home Lantus  Diabetes educator consulted  Admit to telemetry unit continuous cardiac monitoring pulse oximetry  Hold home metformin,  Lactic acidosis   Initially 3.4 down to 2.1 after fluid bolus   Continue IV fluids   Likely Secondary to great toe infection  Sed rate 20, CRP 0.61    Diabetic ulcer of right great toe  Diabetic peripheral neuropathy  3 week history   Prior treatment with Keflex  Consulted to dose vancomycin  Continue Zosyn  X ray unremarkable for osteomyelitis, or osseous abnormalities  Consulted for possible wound debridement  Blood cultures are pending  Continue home gabapentin    Essential Hypertension   Chronic  Continue home lisinopril  and hydrochlorothiazide  Vital Signs     Fibromyalgia  Chronic   Continue home Cymbalta, and baclofen    Migraines   Chronic   Continue Imitrex     Generalized anxiety disorder  Chronic  Continue home lisinopril, Paxil            I discussed the patient's findings and my recommendations with patient.    VTE Prophylaxis - SCDs.  Code Status - Full code.       HEIDI Castañeda  [facility]  02/23/24  22:30 EST

## 2024-02-24 NOTE — PROGRESS NOTES
[facility] Clinical Pharmacy Services: Vancomycin Pharmacokinetic Initial Consult Note    Roxanna Beck is a 58 y.o. female who is on day 1 of pharmacy to dose vancomycin.    Indication: Skin and Soft Tissue  Consulting Provider: Awilda Gay  Planned Duration of Therapy: 5 days  Loading Dose Given: 1500 mg on 02/23 at 1905  Culture/Source:   02/23/24: Blood cultures 2/2 pending    Target: -600 mg/L.hr   Pertinent Vanc Dosing History: N/A  Other Antimicrobials: Zosyn     Vitals/Labs  Ht:  ; Wt: 77.9 kg (171 lb 11.8 oz)  Temp Readings from Last 1 Encounters:   02/23/24 97.4 °F (36.3 °C)    CrCl cannot be calculated (Unknown ideal weight.).     Results from last 7 days   Lab Units 02/23/24  1701   CREATININE mg/dL 0.92   WBC 10*3/mm3 8.20     Assessment/Plan:    Patient received a loading dose at 1900. Will start 1500 mg q24h starting tomorrow but can be adjusted based on renal function.   Predictive AUC level for the dose ordered is 452 mg/L.hr, which is within the target of 400-600 mg/L.hr  Vanc Trough has been ordered for 02/26 at 1400.     Pharmacy will follow patient's kidney function and will adjust doses and obtain levels as necessary. Thank you for involving pharmacy in this patient's care. Please contact pharmacy with any questions or concerns.                           Michael Lenz [facility]  Clinical Pharmacist

## 2024-02-24 NOTE — PLAN OF CARE
Goal Outcome Evaluation:  Plan of Care Reviewed With: patient        Progress: improving  Pt resting between care. VSS.  No acute issues this shift.  Right big toe decreased redness and swelling.

## 2024-02-24 NOTE — PROGRESS NOTES
" LOS: 0 days     Name: Roxanna Beck  Age: 58 y.o.  Sex: female  :  1965  MRN: [id number]         Primary Care Physician: Donato Vilchis DO    Subjective   Subjective  Denies any fevers or chills.  No acute overnight events.  Tolerating antibiotics without rash or diarrhea    Objective   Vital Signs  Temp:  [97.4 °F (36.3 °C)-98.5 °F (36.9 °C)] 97.8 °F (36.6 °C)  Heart Rate:  [] 88  Resp:  [18] 18  BP: (131-170)/(82-94) 143/94  Body mass index is 29.9 kg/m².    Objective:  General Appearance:  Comfortable and in no acute distress.    Vital signs: (most recent): Blood pressure 143/94, pulse 88, temperature 97.8 °F (36.6 °C), temperature source Oral, resp. rate 18, height 162.6 cm (64\"), weight 79 kg (174 lb 2.6 oz), SpO2 96%.    Lungs:  Normal effort and normal respiratory rate.    Heart: Normal rate.  Regular rhythm.    Abdomen: Abdomen is soft.  Bowel sounds are normal.   There is no abdominal tenderness.     Extremities: There is no dependent edema or local swelling.    Neurological: Patient is alert and oriented to person, place and time.    Skin:  Warm and dry.  (Right great toe swelling and erythema with ulceration but no drainage.)              Results Review:       I reviewed the patient's new clinical results.    Results from last 7 days   Lab Units 24  0313 24  1701   WBC 10*3/mm3 6.86 8.20   HEMOGLOBIN g/dL 12.8 13.3   PLATELETS 10*3/mm3 195 205     Results from last 7 days   Lab Units 24  0313 24  1701   SODIUM mmol/L 137 133*   POTASSIUM mmol/L 3.9 5.1   CHLORIDE mmol/L 104 98   CO2 mmol/L 22.0 21.5*   BUN mg/dL 29* 33*   CREATININE mg/dL 0.91 0.92   CALCIUM mg/dL 9.0 10.0   GLUCOSE mg/dL 294* 447*                 Scheduled Meds:   amitriptyline, 75 mg, Oral, Nightly  baclofen, 10 mg, Oral, BID  DULoxetine, 60 mg, Oral, Daily  insulin glargine, 50 Units, Subcutaneous, Nightly  insulin lispro, 3-14 Units, Subcutaneous, 4x Daily With Meals & Nightly  lisinopril, " 20 mg, Oral, Daily  pantoprazole, 40 mg, Oral, Q AM  PARoxetine, 20 mg, Oral, QAM  piperacillin-tazobactam, 3.375 g, Intravenous, Q8H  rosuvastatin, 10 mg, Oral, Daily  sodium chloride, 10 mL, Intravenous, Q12H  vancomycin, 1,500 mg, Intravenous, Q24H      PRN Meds:     senna-docusate sodium **AND** polyethylene glycol **AND** bisacodyl **AND** bisacodyl    cetirizine    dextrose    dextrose    gabapentin    glucagon (human recombinant)    nitroglycerin    Pharmacy to dose vancomycin    sodium chloride    sodium chloride    SUMAtriptan  Continuous Infusions:  lactated ringers, 100 mL/hr, Last Rate: 100 mL/hr (02/24/24 1143)  Pharmacy to dose vancomycin,         Assessment & Plan   Active Hospital Problems    Diagnosis  POA    **Diabetic ulcer of right great toe [E11.621, L97.519]  Yes    Hyperglycemia [R73.9]  Yes    Lactic acidosis [E87.20]  Yes    Insulin dependent type 2 diabetes mellitus [E11.9, Z79.4]  Not Applicable    Hypertension [I10]  Yes    Generalized anxiety disorder [F41.1]  Yes    Diabetic peripheral neuropathy [E11.42]  Yes      Resolved Hospital Problems   No resolved problems to display.       Assessment & Plan    -Continue broad-spectrum antibiotics  -Podiatry has been consulted  -Blood sugars uncontrolled.  I will intensify her insulin regimen  -Decrease rate of IV fluids    Dispo      Expected discharge date/ time has not been documented.     Jaime Rivers MD  [facility]  02/24/24  11:55 EST

## 2024-02-25 PROBLEM — E11.65 HYPERGLYCEMIA DUE TO DIABETES MELLITUS: Status: ACTIVE | Noted: 2024-02-25

## 2024-02-25 LAB
ANION GAP SERPL CALCULATED.3IONS-SCNC: 8 MMOL/L (ref 5–15)
BUN SERPL-MCNC: 21 MG/DL (ref 6–20)
BUN/CREAT SERPL: 23.1 (ref 7–25)
CALCIUM SPEC-SCNC: 9.3 MG/DL (ref 8.6–10.5)
CHLORIDE SERPL-SCNC: 106 MMOL/L (ref 98–107)
CO2 SERPL-SCNC: 26 MMOL/L (ref 22–29)
CREAT SERPL-MCNC: 0.91 MG/DL (ref 0.57–1)
DEPRECATED RDW RBC AUTO: 39.3 FL (ref 37–54)
EGFRCR SERPLBLD CKD-EPI 2021: 72.8 ML/MIN/1.73
ERYTHROCYTE [DISTWIDTH] IN BLOOD BY AUTOMATED COUNT: 12 % (ref 12.3–15.4)
GLUCOSE BLDC GLUCOMTR-MCNC: 148 MG/DL (ref 70–130)
GLUCOSE BLDC GLUCOMTR-MCNC: 234 MG/DL (ref 70–130)
GLUCOSE BLDC GLUCOMTR-MCNC: 240 MG/DL (ref 70–130)
GLUCOSE BLDC GLUCOMTR-MCNC: 289 MG/DL (ref 70–130)
GLUCOSE SERPL-MCNC: 274 MG/DL (ref 65–99)
HCT VFR BLD AUTO: 37.7 % (ref 34–46.6)
HGB BLD-MCNC: 12.9 G/DL (ref 12–15.9)
MCH RBC QN AUTO: 30.9 PG (ref 26.6–33)
MCHC RBC AUTO-ENTMCNC: 34.2 G/DL (ref 31.5–35.7)
MCV RBC AUTO: 90.2 FL (ref 79–97)
PLATELET # BLD AUTO: 184 10*3/MM3 (ref 140–450)
PMV BLD AUTO: 10.1 FL (ref 6–12)
POTASSIUM SERPL-SCNC: 4.3 MMOL/L (ref 3.5–5.2)
RBC # BLD AUTO: 4.18 10*6/MM3 (ref 3.77–5.28)
SODIUM SERPL-SCNC: 140 MMOL/L (ref 136–145)
WBC NRBC COR # BLD AUTO: 5.42 10*3/MM3 (ref 3.4–10.8)

## 2024-02-25 PROCEDURE — 85027 COMPLETE CBC AUTOMATED: CPT | Performed by: INTERNAL MEDICINE

## 2024-02-25 PROCEDURE — 25010000002 VANCOMYCIN 10 G RECONSTITUTED SOLUTION: Performed by: NURSE PRACTITIONER

## 2024-02-25 PROCEDURE — 63710000001 INSULIN LISPRO (HUMAN) PER 5 UNITS: Performed by: NURSE PRACTITIONER

## 2024-02-25 PROCEDURE — 82948 REAGENT STRIP/BLOOD GLUCOSE: CPT

## 2024-02-25 PROCEDURE — 25010000002 PIPERACILLIN SOD-TAZOBACTAM PER 1 G: Performed by: NURSE PRACTITIONER

## 2024-02-25 PROCEDURE — 25810000003 SODIUM CHLORIDE 0.9 % SOLUTION: Performed by: NURSE PRACTITIONER

## 2024-02-25 PROCEDURE — 80048 BASIC METABOLIC PNL TOTAL CA: CPT | Performed by: INTERNAL MEDICINE

## 2024-02-25 PROCEDURE — 63710000001 INSULIN LISPRO (HUMAN) PER 5 UNITS: Performed by: INTERNAL MEDICINE

## 2024-02-25 PROCEDURE — 63710000001 INSULIN GLARGINE PER 5 UNITS: Performed by: NURSE PRACTITIONER

## 2024-02-25 RX ORDER — INSULIN LISPRO 100 [IU]/ML
8 INJECTION, SOLUTION INTRAVENOUS; SUBCUTANEOUS
Status: DISCONTINUED | OUTPATIENT
Start: 2024-02-25 | End: 2024-02-26 | Stop reason: HOSPADM

## 2024-02-25 RX ORDER — INSULIN LISPRO 100 [IU]/ML
10 INJECTION, SOLUTION INTRAVENOUS; SUBCUTANEOUS
Status: DISCONTINUED | OUTPATIENT
Start: 2024-02-25 | End: 2024-02-25

## 2024-02-25 RX ADMIN — BACLOFEN 10 MG: 10 TABLET ORAL at 08:02

## 2024-02-25 RX ADMIN — DULOXETINE HYDROCHLORIDE 60 MG: 60 CAPSULE, DELAYED RELEASE ORAL at 08:02

## 2024-02-25 RX ADMIN — INSULIN LISPRO 8 UNITS: 100 INJECTION, SOLUTION INTRAVENOUS; SUBCUTANEOUS at 07:56

## 2024-02-25 RX ADMIN — PIPERACILLIN SODIUM AND TAZOBACTAM SODIUM 3.38 G: 3; .375 INJECTION, SOLUTION INTRAVENOUS at 08:00

## 2024-02-25 RX ADMIN — AMITRIPTYLINE HYDROCHLORIDE 75 MG: 50 TABLET, FILM COATED ORAL at 19:28

## 2024-02-25 RX ADMIN — INSULIN GLARGINE 50 UNITS: 100 INJECTION, SOLUTION SUBCUTANEOUS at 22:17

## 2024-02-25 RX ADMIN — PANTOPRAZOLE SODIUM 40 MG: 40 TABLET, DELAYED RELEASE ORAL at 06:23

## 2024-02-25 RX ADMIN — INSULIN LISPRO 8 UNITS: 100 INJECTION, SOLUTION INTRAVENOUS; SUBCUTANEOUS at 11:47

## 2024-02-25 RX ADMIN — VANCOMYCIN HYDROCHLORIDE 1500 MG: 10 INJECTION, POWDER, LYOPHILIZED, FOR SOLUTION INTRAVENOUS at 14:14

## 2024-02-25 RX ADMIN — LISINOPRIL 20 MG: 20 TABLET ORAL at 08:02

## 2024-02-25 RX ADMIN — PIPERACILLIN SODIUM AND TAZOBACTAM SODIUM 3.38 G: 3; .375 INJECTION, SOLUTION INTRAVENOUS at 00:02

## 2024-02-25 RX ADMIN — BACLOFEN 10 MG: 10 TABLET ORAL at 19:28

## 2024-02-25 RX ADMIN — INSULIN LISPRO 5 UNITS: 100 INJECTION, SOLUTION INTRAVENOUS; SUBCUTANEOUS at 07:55

## 2024-02-25 RX ADMIN — Medication 10 ML: at 08:00

## 2024-02-25 RX ADMIN — INSULIN LISPRO 8 UNITS: 100 INJECTION, SOLUTION INTRAVENOUS; SUBCUTANEOUS at 11:49

## 2024-02-25 RX ADMIN — PIPERACILLIN SODIUM AND TAZOBACTAM SODIUM 3.38 G: 3; .375 INJECTION, SOLUTION INTRAVENOUS at 17:28

## 2024-02-25 RX ADMIN — ROSUVASTATIN CALCIUM 10 MG: 10 TABLET, FILM COATED ORAL at 10:05

## 2024-02-25 RX ADMIN — INSULIN LISPRO 8 UNITS: 100 INJECTION, SOLUTION INTRAVENOUS; SUBCUTANEOUS at 17:28

## 2024-02-25 RX ADMIN — INSULIN LISPRO 8 UNITS: 100 INJECTION, SOLUTION INTRAVENOUS; SUBCUTANEOUS at 17:27

## 2024-02-25 NOTE — CASE MANAGEMENT/SOCIAL WORK
Continued Stay Note  [facility]     Patient Name: Roxanna Beck  MRN: [id number]  Today's Date: 2/25/2024    Admit Date: 2/23/2024    Plan: Home with SO   Discharge Plan       Row Name 02/25/24 1606       Plan    Plan Home with SO    Plan Comments CCP spoke to pt introduced self, role and discussed dc plan.  She lives with her SO in a ground floor apartment. They share household chores.  pt is currently working on getting disability and money is tight.  This is the first month she has been unable to pay her rent. She is filing with [employer] in hopes she does not get evicted.  Coworker took her a list of commumity resources to assist her in paying bills, etc.  She currently is [employer] and uses no DME.  She is asking for a walker on dc.  Will need PT eval to see if she qualifies for one. Secure chat sent to pts nurse.  Pt uses [facility] on [address] and has no issues with medication copays.  She wants to use Meds to beds on dc so she doesn't have to stop on the way home.  added in Epic. pt has never used HH or been to IP rehab.  her plan isto return home with SO who will transport her home.  CCP will follow. Katie Guajardo                   Discharge Codes    No documentation.                 Expected Discharge Date and Time       Expected Discharge Date Expected Discharge Time    Feb 26, 2024               Katie Romo

## 2024-02-25 NOTE — PLAN OF CARE
Problem: Adult Inpatient Plan of Care  Goal: Plan of Care Review  Outcome: Ongoing, Progressing  Goal: Patient-Specific Goal (Individualized)  Outcome: Ongoing, Progressing  Goal: Absence of Hospital-Acquired Illness or Injury  Outcome: Ongoing, Progressing  Intervention: Identify and Manage Fall Risk  Description: Perform standard risk assessment on admission using a validated tool or comprehensive approach appropriate to the patient; reassess fall risk frequently, with change in status or transfer to another level of care.  Communicate fall injury risk to interprofessional healthcare team.  Determine need for increased observation, equipment and environmental modification, such as low bed, signage and supportive, nonskid footwear.  Adjust safety measures to individual developmental age, stage and identified risk factors.  Reinforce the importance of safety and physical activity with patient and family.  Perform regular intentional rounding to assess need for position change, pain assessment and personal needs, including assistance with toileting.  Recent Flowsheet Documentation  Taken 2/25/2024 1400 by Soco Cooper RN  Safety Promotion/Fall Prevention:   assistive device/personal items within reach   safety round/check completed  Taken 2/25/2024 1200 by Soco Cooper RN  Safety Promotion/Fall Prevention: safety round/check completed  Taken 2/25/2024 1005 by Soco Cooper RN  Safety Promotion/Fall Prevention:   activity supervised   room organization consistent  Intervention: Prevent Skin Injury  Description: Perform a screening for skin injury risk, such as pressure or moisture associated skin damage on admission and at regular intervals throughout hospital stay.  Keep all areas of skin (especially folds) clean and dry.  Maintain adequate skin hydration.  Relieve and redistribute pressure and protect bony prominences; implement measures based on patient-specific risk factors.  Match turning and  repositioning schedule to clinical condition.  Encourage weight shift frequently; assist with reposition if unable to complete independently.  Float heels off bed; avoid pressure on the Achilles tendon.  Keep skin free from extended contact with medical devices.  Encourage functional activity and mobility, as early as tolerated.  Use aids (e.g., slide boards, mechanical lift) during transfer.  Recent Flowsheet Documentation  Taken 2/25/2024 1400 by Soco Cooper RN  Body Position: supine  Taken 2/25/2024 1200 by Soco Cooper RN  Body Position: dangle, side of bed  Taken 2/25/2024 1005 by Soco Cooper RN  Body Position: supine  Taken 2/25/2024 0815 by Soco Cooper RN  Body Position: position changed independently  Intervention: Prevent and Manage VTE (Venous Thromboembolism) Risk  Description: Assess for VTE (venous thromboembolism) risk.  Encourage and assist with early ambulation.  Initiate and maintain compression or other therapy, as indicated, based on identified risk in accordance with organizational protocol and provider order.  Encourage both active and passive leg exercises while in bed, if unable to ambulate.  Recent Flowsheet Documentation  Taken 2/25/2024 1400 by Soco Cooper RN  Activity Management: up ad george  VTE Prevention/Management: patient refused intervention  Taken 2/25/2024 1200 by Soco Cooper RN  Activity Management: up ad george  VTE Prevention/Management: patient refused intervention  Taken 2/25/2024 1005 by Soco Cooper RN  Activity Management: up ad george  VTE Prevention/Management: patient refused intervention  Taken 2/25/2024 0815 by Soco Cooper RN  Activity Management: up ad george  Intervention: Prevent Infection  Description: Maintain skin and mucous membrane integrity; promote hand, oral and pulmonary hygiene.  Optimize fluid balance, nutrition, sleep and glycemic control to maximize infection resistance.  Identify potential sources of infection early to prevent or  mitigate progression of infection (e.g., wound, lines, devices).  Evaluate ongoing need for invasive devices; remove promptly when no longer indicated.  Recent Flowsheet Documentation  Taken 2/25/2024 1400 by Soco Cooper RN  Infection Prevention: single patient room provided  Taken 2/25/2024 1200 by Soco Cooper RN  Infection Prevention: single patient room provided  Taken 2/25/2024 1005 by Soco Cooper RN  Infection Prevention: single patient room provided  Taken 2/25/2024 0815 by Soco Cooper RN  Infection Prevention: single patient room provided  Goal: Optimal Comfort and Wellbeing  Outcome: Ongoing, Progressing  Intervention: Provide Person-Centered Care  Description: Use a family-focused approach to care.  Develop trust and rapport by proactively providing information, encouraging questions, addressing concerns and offering reassurance.  Acknowledge emotional response to hospitalization.  Recognize and utilize personal coping strategies.  Honor spiritual and cultural preferences.  Recent Flowsheet Documentation  Taken 2/25/2024 0815 by Soco Cooper RN  Trust Relationship/Rapport:   reassurance provided   questions encouraged   questions answered   empathic listening provided   emotional support provided   care explained  Goal: Readiness for Transition of Care  Outcome: Ongoing, Progressing   Goal Outcome Evaluation:

## 2024-02-25 NOTE — PROGRESS NOTES
" LOS: 0 days     Name: Roxanna Beck  Age: 58 y.o.  Sex: female  :  1965  MRN: [id number]         Primary Care Physician: Donato Vilchis DO    Subjective   Subjective  No new complaints.  Denies fevers or chills.  No change to the appearance of her right toe    Objective   Vital Signs  Temp:  [97.7 °F (36.5 °C)-98.4 °F (36.9 °C)] 97.7 °F (36.5 °C)  Heart Rate:  [71-82] 71  Resp:  [16-18] 16  BP: (114-154)/(71-90) 114/71  Body mass index is 29.74 kg/m².    Objective:  General Appearance:  Comfortable and in no acute distress.    Vital signs: (most recent): Blood pressure 114/71, pulse 71, temperature 97.7 °F (36.5 °C), temperature source Oral, resp. rate 16, height 162.6 cm (64\"), weight 78.6 kg (173 lb 4.5 oz), SpO2 98%.    Lungs:  Normal effort and normal respiratory rate.    Heart: Normal rate.  Regular rhythm.    Abdomen: Abdomen is soft.  Bowel sounds are normal.   There is no abdominal tenderness.     Extremities: There is no dependent edema or local swelling.  (Erythema, swelling, ulceration to the right great toe)  Neurological: Patient is alert and oriented to person, place and time.    Skin:  Warm and dry.                Results Review:       I reviewed the patient's new clinical results.    Results from last 7 days   Lab Units 24  0505 24  0313 24  1701   WBC 10*3/mm3 5.42 6.86 8.20   HEMOGLOBIN g/dL 12.9 12.8 13.3   PLATELETS 10*3/mm3 184 195 205     Results from last 7 days   Lab Units 24  0505 24  0313 24  1701   SODIUM mmol/L 140 137 133*   POTASSIUM mmol/L 4.3 3.9 5.1   CHLORIDE mmol/L 106 104 98   CO2 mmol/L 26.0 22.0 21.5*   BUN mg/dL 21* 29* 33*   CREATININE mg/dL 0.91 0.91 0.92   CALCIUM mg/dL 9.3 9.0 10.0   GLUCOSE mg/dL 274* 294* 447*                 Scheduled Meds:   amitriptyline, 75 mg, Oral, Nightly  baclofen, 10 mg, Oral, BID  DULoxetine, 60 mg, Oral, Daily  insulin glargine, 50 Units, Subcutaneous, Nightly  insulin lispro, 3-14 Units, " Subcutaneous, 4x Daily With Meals & Nightly  insulin lispro, 8 Units, Subcutaneous, TID With Meals  lisinopril, 20 mg, Oral, Daily  pantoprazole, 40 mg, Oral, Q AM  PARoxetine, 20 mg, Oral, QAM  piperacillin-tazobactam, 3.375 g, Intravenous, Q8H  rosuvastatin, 10 mg, Oral, Daily  sodium chloride, 10 mL, Intravenous, Q12H  vancomycin, 1,500 mg, Intravenous, Q24H      PRN Meds:     senna-docusate sodium **AND** polyethylene glycol **AND** bisacodyl **AND** bisacodyl    cetirizine    dextrose    dextrose    gabapentin    glucagon (human recombinant)    nitroglycerin    Pharmacy to dose vancomycin    sodium chloride    sodium chloride    SUMAtriptan  Continuous Infusions:  lactated ringers, 75 mL/hr, Last Rate: 75 mL/hr (02/24/24 1324)  Pharmacy to dose vancomycin,         Assessment & Plan   Active Hospital Problems    Diagnosis  POA    **Diabetic ulcer of right great toe [E11.621, L97.519]  Yes    Hyperglycemia [R73.9]  Yes    Lactic acidosis [E87.20]  Yes    Insulin dependent type 2 diabetes mellitus [E11.9, Z79.4]  Not Applicable    Hypertension [I10]  Yes    Generalized anxiety disorder [F41.1]  Yes    Diabetic peripheral neuropathy [E11.42]  Yes      Resolved Hospital Problems   No resolved problems to display.       Assessment & Plan    -Continue broad-spectrum antibiotics  -Podiatry has been consulted  -Blood sugars dropped to 65 this morning.  Had been running consistently in the low 200s.  Will hold on any further increase of Lantus.  -Stop IV fluids for now      Expected discharge date/ time has not been documented.     Jaime Rivers MD  [facility]  02/25/24  11:10 EST

## 2024-02-25 NOTE — CONSULTS
"Podiatry Consult Note      Patient: Roxanna Beck Admit Date: 2024    Age: 58 y.o.   PCP: Donato Vilchis DO    MRN: [id number]  Room: S502/1        Subjective     Chief Complaint     Chief Complaint   Patient presents with    Wound Check        HPI     This is a 58-year-old female who presents with cellulitis and diabetic foot ulcer to the right big toe.  Patient states this has been present for about 3 weeks.  She saw her primary care doctor who prescribed her oral antibiotics and a topical cream.  She has been applying daily.  She states the redness started to move up her foot and she came to the emergency department.  She denies any purulent drainage from the site, however states there was bloody drainage.  She has been receiving antibiotics since being admitted.  She has had x-rays done which show no signs of osteomyelitis.  She denies any nausea vomiting fevers or chills.    Past Medical History     Past Medical History:   Diagnosis Date    Arthritis     Depression     Diabetes mellitus     checks sugar once per day     Dizzy     Fibromyalgia     GERD (gastroesophageal reflux disease)     History of kidney stones     HNP (herniated nucleus pulposus)     Hyperlipidemia     Hypertension     Wears glasses     readers        Past Surgical History:   Procedure Laterality Date    BACK SURGERY      L4-5 Discectomy    CARPAL TUNNEL RELEASE      left     SECTION      CHOLECYSTECTOMY      COLONOSCOPY      ENDOSCOPY      HYSTERECTOMY      KIDNEY STONE SURGERY      LUMBAR DISCECTOMY N/A 12/15/2016    Procedure: LUMBAR DISCECTOMY L3-4;  Surgeon: Sandro Cuenca MD;  Location: [facility];  Service:     OOPHORECTOMY      TONSILLECTOMY      WISDOM TOOTH EXTRACTION          Allergies   Allergen Reactions    Codeine Nausea And Vomiting    Percocet [Oxycodone-Acetaminophen] Nausea And Vomiting     Caused bad pain    Adhesive Tape Rash    Toradol [Ketorolac Tromethamine] Other (See Comments)     \"doesnt do " "anything\"   - DOESN'T WORK PER PATIENT        Social History     Tobacco Use   Smoking Status Never   Smokeless Tobacco Never        Objective   Physical Exam    Vitals:    02/25/24 1107   BP: 124/85   Pulse: 75   Resp: 16   Temp: 97.9 °F (36.6 °C)   SpO2: 98%          Dermatology: 2.0 x 0.8 x 0.4 ulceration to the right plantar medial hallux.  There is no probe to bone.  There is no purulent drainage.  Base is 80% granular 20% fibrotic.  Moderate erythema to the level of the first MPJ.    Vascular: DP and PT pulses are palpable    Neurological: Light touch and protective sensation are diminished to the forefoot midfoot    Musckuloskeletal: No pain with hallux IPJ or first MTPJ range of motion.  No crepitus noted.  There is limited range of motion to the right first metatarsophalangeal joint dorsiflexion.    Labs     Lab Results   Component Value Date    HGBA1C 10.90 (H) 12/11/2016    POCGLU 289 (H) 02/25/2024    SEDRATE 20 02/23/2024        CBC:      Lab 02/25/24  0505 02/24/24  0313 02/23/24  1701   WBC 5.42 6.86 8.20   HEMOGLOBIN 12.9 12.8 13.3   HEMATOCRIT 37.7 38.0 39.2   PLATELETS 184 195 205   NEUTROS ABS  --  4.02 5.62   IMMATURE GRANS (ABS)  --  0.02 0.02   LYMPHS ABS  --  1.98 1.62   MONOS ABS  --  0.62 0.71   EOS ABS  --  0.19 0.19   MCV 90.2 90.7 91.0          Results for orders placed or performed during the hospital encounter of 02/23/24   Blood Culture - Blood, Arm, Left    Specimen: Arm, Left; Blood   Result Value Ref Range    Blood Culture No growth at 24 hours         XR Foot 3+ View Right  Narrative: RIGHT FOOT: AP, LATERAL, OBLIQUE     HISTORY: Right great toe cellulitis. Ulcer.     COMPARISON: None.     FINDINGS: Midfoot alignment appears within normal limits.  There is no  evidence for fracture or acute osseous abnormality. There is mild soft  tissue swelling. Chronic calcification extends along the course of the  proximal plantar fascia.  Heel spurs are present. There are mild  arthritic " changes at the 1st MTP joint.      Impression: Soft tissue swelling without radiographic evidence for  osteomyelitis or acute osseous abnormality.        This report was finalized on 2/23/2024 4:56 PM by Dr. Heraclio Bustos M.D on Workstation: [id number]          Assessment/Plan     58-year-old female with diabetic foot ulcer with fat exposed, right hallux, cellulitis right foot    -Patient examined and evaluated by myself  - Patient states colitis is improving with her antibiotics, agree to continue regimen  - I debrided the ulcer at bedside and removed all callus tissue.  The ulcer base is healthy and I have no concerns for osteomyelitis or deep infection.  - Betadine wet-to-dry dressing change daily for the patient.  - She needs to be weightbearing as tolerated in a postop shoe.  I will place an order for the shoe.  - Patient is okay from podiatry standpoint; she needs to follow-up with me outpatient    Thank you for the consult, I will continue to follow    López Zapata DPM  Office: [phone]

## 2024-02-25 NOTE — PLAN OF CARE
Goal Outcome Evaluation:  Plan of Care Reviewed With: patient        Progress: no change  Outcome Evaluation: Vitals stable. No c/o pain throughout night. No acute overnight events.

## 2024-02-25 NOTE — DISCHARGE PLACEMENT REQUEST
"Mariela Beck (58 y.o. Female)       Date of Birth   1965    Social Security Number       Address   [address] [address]    Home Phone       MRN   [id number]       [employer]   [facility]    Marital Status   Single                            Admission Date   2/23/24    Admission Type   Emergency    Admitting Provider   Alejandro Krishnan MD    Attending Provider   Jaime Rivers MD    Department, Room/Bed   [address], S502/1       Discharge Date       Discharge Disposition       Discharge Destination                                 Attending Provider: Jaime Rivers MD    Allergies: Codeine, Percocet [Oxycodone-acetaminophen], Adhesive Tape, Toradol [Ketorolac Tromethamine]    Isolation: None   Infection: None   Code Status: CPR    Ht: 162.6 cm (64\")   Wt: 78.6 kg (173 lb 4.5 oz)    Admission Cmt: None   Principal Problem: Diabetic ulcer of right great toe [E11.621,L97.519]                   Active Insurance as of 2/23/2024       Primary Coverage       Payor Plan Insurance Group Employer/Plan Group    [employer] MEDICAID [facility] MEDICAID [id number]       Payor Plan Address Payor Plan Phone Number Payor Plan Fax Number Effective Dates    [address] [address] [phone]  1/1/2024 - None Entered    [facility] [id number]         Subscriber Name Subscriber Birth Date Member ID       MARIELA BECK 1965 [id number]                     Emergency Contacts        (Rel.) Home Phone Work Phone Mobile Phone    Sarah Zayas (Sister) -- -- [phone]    PIERO ARROYO (Daughter) -- -- [phone]    Barb Beck (Brother) [phone] -- --    BERTHA TRIPP (Significant Other) -- -- [phone]                "

## 2024-02-26 ENCOUNTER — READMISSION MANAGEMENT (OUTPATIENT)
Dept: CALL CENTER | Facility: HOSPITAL | Age: 59
End: 2024-02-26
Payer: MEDICAID

## 2024-02-26 VITALS
WEIGHT: 176.15 LBS | SYSTOLIC BLOOD PRESSURE: 137 MMHG | TEMPERATURE: 97.5 F | BODY MASS INDEX: 30.07 KG/M2 | DIASTOLIC BLOOD PRESSURE: 81 MMHG | RESPIRATION RATE: 16 BRPM | HEIGHT: 64 IN | OXYGEN SATURATION: 97 % | HEART RATE: 74 BPM

## 2024-02-26 PROBLEM — E87.20 LACTIC ACIDOSIS: Status: RESOLVED | Noted: 2024-02-23 | Resolved: 2024-02-26

## 2024-02-26 LAB
ANION GAP SERPL CALCULATED.3IONS-SCNC: 9.4 MMOL/L (ref 5–15)
BACTERIA SPEC AEROBE CULT: ABNORMAL
BACTERIA SPEC AEROBE CULT: ABNORMAL
BUN SERPL-MCNC: 21 MG/DL (ref 6–20)
BUN/CREAT SERPL: 21.4 (ref 7–25)
CALCIUM SPEC-SCNC: 8.7 MG/DL (ref 8.6–10.5)
CHLORIDE SERPL-SCNC: 103 MMOL/L (ref 98–107)
CO2 SERPL-SCNC: 23.6 MMOL/L (ref 22–29)
CREAT SERPL-MCNC: 0.98 MG/DL (ref 0.57–1)
DEPRECATED RDW RBC AUTO: 40 FL (ref 37–54)
EGFRCR SERPLBLD CKD-EPI 2021: 66.6 ML/MIN/1.73
ERYTHROCYTE [DISTWIDTH] IN BLOOD BY AUTOMATED COUNT: 12.1 % (ref 12.3–15.4)
GLUCOSE BLDC GLUCOMTR-MCNC: 333 MG/DL (ref 70–130)
GLUCOSE BLDC GLUCOMTR-MCNC: 366 MG/DL (ref 70–130)
GLUCOSE BLDC GLUCOMTR-MCNC: 412 MG/DL (ref 70–130)
GLUCOSE SERPL-MCNC: 383 MG/DL (ref 65–99)
GRAM STN SPEC: ABNORMAL
GRAM STN SPEC: ABNORMAL
HBA1C MFR BLD: 12.4 % (ref 4.8–5.6)
HCT VFR BLD AUTO: 37.4 % (ref 34–46.6)
HGB BLD-MCNC: 12.6 G/DL (ref 12–15.9)
MCH RBC QN AUTO: 30.8 PG (ref 26.6–33)
MCHC RBC AUTO-ENTMCNC: 33.7 G/DL (ref 31.5–35.7)
MCV RBC AUTO: 91.4 FL (ref 79–97)
PLATELET # BLD AUTO: 197 10*3/MM3 (ref 140–450)
PMV BLD AUTO: 10.1 FL (ref 6–12)
POTASSIUM SERPL-SCNC: 4.1 MMOL/L (ref 3.5–5.2)
RBC # BLD AUTO: 4.09 10*6/MM3 (ref 3.77–5.28)
SODIUM SERPL-SCNC: 136 MMOL/L (ref 136–145)
WBC NRBC COR # BLD AUTO: 7.14 10*3/MM3 (ref 3.4–10.8)

## 2024-02-26 PROCEDURE — 80048 BASIC METABOLIC PNL TOTAL CA: CPT | Performed by: INTERNAL MEDICINE

## 2024-02-26 PROCEDURE — 25010000002 PIPERACILLIN SOD-TAZOBACTAM PER 1 G: Performed by: NURSE PRACTITIONER

## 2024-02-26 PROCEDURE — 85027 COMPLETE CBC AUTOMATED: CPT | Performed by: INTERNAL MEDICINE

## 2024-02-26 PROCEDURE — 97162 PT EVAL MOD COMPLEX 30 MIN: CPT

## 2024-02-26 PROCEDURE — 82948 REAGENT STRIP/BLOOD GLUCOSE: CPT

## 2024-02-26 PROCEDURE — 63710000001 INSULIN LISPRO (HUMAN) PER 5 UNITS: Performed by: NURSE PRACTITIONER

## 2024-02-26 PROCEDURE — 97110 THERAPEUTIC EXERCISES: CPT

## 2024-02-26 PROCEDURE — 83036 HEMOGLOBIN GLYCOSYLATED A1C: CPT | Performed by: PODIATRIST

## 2024-02-26 PROCEDURE — 63710000001 INSULIN LISPRO (HUMAN) PER 5 UNITS: Performed by: INTERNAL MEDICINE

## 2024-02-26 RX ORDER — INSULIN LISPRO 100 [IU]/ML
5 INJECTION, SOLUTION INTRAVENOUS; SUBCUTANEOUS
Qty: 15 ML | Refills: 0 | Status: SHIPPED | OUTPATIENT
Start: 2024-02-26

## 2024-02-26 RX ORDER — PEN NEEDLE, DIABETIC 30 GX3/16"
1 NEEDLE, DISPOSABLE MISCELLANEOUS 4 TIMES DAILY PRN
Qty: 100 EACH | Refills: 12 | Status: SHIPPED | OUTPATIENT
Start: 2024-02-26

## 2024-02-26 RX ORDER — CEPHALEXIN 500 MG/1
500 CAPSULE ORAL 4 TIMES DAILY
Qty: 28 CAPSULE | Refills: 0 | Status: SHIPPED | OUTPATIENT
Start: 2024-02-26 | End: 2024-03-04

## 2024-02-26 RX ADMIN — DULOXETINE HYDROCHLORIDE 60 MG: 60 CAPSULE, DELAYED RELEASE ORAL at 08:53

## 2024-02-26 RX ADMIN — ROSUVASTATIN CALCIUM 10 MG: 10 TABLET, FILM COATED ORAL at 08:53

## 2024-02-26 RX ADMIN — PIPERACILLIN SODIUM AND TAZOBACTAM SODIUM 3.38 G: 3; .375 INJECTION, SOLUTION INTRAVENOUS at 00:01

## 2024-02-26 RX ADMIN — INSULIN LISPRO 12 UNITS: 100 INJECTION, SOLUTION INTRAVENOUS; SUBCUTANEOUS at 11:29

## 2024-02-26 RX ADMIN — Medication 10 ML: at 08:54

## 2024-02-26 RX ADMIN — PANTOPRAZOLE SODIUM 40 MG: 40 TABLET, DELAYED RELEASE ORAL at 06:01

## 2024-02-26 RX ADMIN — PIPERACILLIN SODIUM AND TAZOBACTAM SODIUM 3.38 G: 3; .375 INJECTION, SOLUTION INTRAVENOUS at 08:52

## 2024-02-26 RX ADMIN — PAROXETINE HYDROCHLORIDE HEMIHYDRATE 20 MG: 20 TABLET, FILM COATED ORAL at 08:50

## 2024-02-26 RX ADMIN — INSULIN LISPRO 8 UNITS: 100 INJECTION, SOLUTION INTRAVENOUS; SUBCUTANEOUS at 08:53

## 2024-02-26 RX ADMIN — INSULIN LISPRO 8 UNITS: 100 INJECTION, SOLUTION INTRAVENOUS; SUBCUTANEOUS at 11:29

## 2024-02-26 RX ADMIN — LISINOPRIL 20 MG: 20 TABLET ORAL at 08:52

## 2024-02-26 RX ADMIN — BACLOFEN 10 MG: 10 TABLET ORAL at 08:53

## 2024-02-26 RX ADMIN — INSULIN LISPRO 10 UNITS: 100 INJECTION, SOLUTION INTRAVENOUS; SUBCUTANEOUS at 08:51

## 2024-02-26 NOTE — NURSING NOTE
"Diabetes Education  Assessment/Teaching    Patient Name:  Roxanna Beck  YOB: 1965  MRN: [id number]  Admit Date:  2/23/2024      Assessment Date:  2/26/2024  Flowsheet Row Most Recent Value   General Information     Referral From: NP/MD order. Meet with 59 y/o at bedside on 5S to assess needs for DM ed.    Height 162.6 cm (64\")   Height Method Stated   Weight 79.9 kg (176 lb 2.4 oz)   Weight Method Bed scale   Diabetes History    What type of diabetes do you have? Type 2   Length of Diabetes Diagnosis -- 20+ yrs per pt.   Do you test your blood sugar at home? yes -pt has a CGM.   Have you had low blood sugar? (<70mg/dl) yes   How often do you have low blood sugar? occasionally   Have you had high blood sugar? (>140mg/dl) Yes- current A1c is 12.4%.   Do you have any diabetes complications? neuropathy -in feet as well as hands/arms.   Education Preferences    Barriers to Learning -- no learning barriers noted at this time.   Nutrition Information Pt reports drinking plenty of water.    Assessment Topics    Taking Medication - Assessment Needs education -plan at this time is add Humalog w/meals.   Healthy Coping - Assessment Competent -s.o. arrived to bedside for dc.   Monitoring - Assessment Competent   DM Goals    Contact Plan Follow-up medical care            Flowsheet Row Most Recent Value   DM Education Needs    Meter Has own   Meter Type Freestyle   Blood Glucose Target Range -below 200.   Medication Insulin, Administration - Humalog use/5 unit dose for meals, insulin pen use. Pt reports she was not holding insulin pen in skin for at least 5 seconds after pushing the injection button. advise pt to hold pen in skin at least 5 seconds after depressing injection button.   Problem Solving Hypoglycemia, Treatment -pt reports her s.o. has hypoglycemia. advise pt to call PCP for BGs continuing in the 200's -for poss titration of insulin.   Reducing Risks Neuropathy. Pt reports outpt podiatry f/u Friday. "   Healthy Coping Appropriate   Discharge Plan Home, Follow-up with PCP   Motivation Engaged   Teaching Method Explanation, Discussion   Patient Response Verbalized understanding        Other Comments:    Electronically signed by:  Daysi Fuentes RN  02/26/24 12:34 EST

## 2024-02-26 NOTE — DISCHARGE SUMMARY
"    Patient Name: Roxanna Beck  : 1965  MRN: [id number]    Date of Admission: 2024  Date of Discharge:  2024  Primary Care Physician: Donato Vilchis DO      Chief Complaint:   Wound Check      Discharge Diagnoses     Active Hospital Problems    Diagnosis  POA    **Diabetic ulcer of right great toe [E11.621, L97.519]  Yes    Hyperglycemia due to diabetes mellitus [E11.65]  Yes    Hyperglycemia [R73.9]  Yes    Insulin dependent type 2 diabetes mellitus [E11.9, Z79.4]  Not Applicable    Hypertension [I10]  Yes    Generalized anxiety disorder [F41.1]  Yes    Diabetic peripheral neuropathy [E11.42]  Yes      Resolved Hospital Problems    Diagnosis Date Resolved POA    Lactic acidosis [E87.20] 2024 Yes        Admitting HPI     \"Ms. Beck is a 58 y o female with PMH of uncontrolled DM, GERD, HLD, HTN, and depression who presents to [facility] today with complaints of right toe redness, swelling and pain. She does endorse seeing her PCP three weeks ago who prescribed her keflex and bactroban without significant improvement. She states the area started as a large corn that has opened and intermittently draining, She states the redness has improved since starting on the Keflex it was initially covering the entire dorsal portion of her left foot.  It is now just at the base of her great toe. She does also endorse recent changes to her insulin with the addition of Lantus that has  improved her blood glucose levels. She denies any acute acute distress chest pain, shortness of breath, palpitations, lightheadedness, dizziness, fever, chills, cough, abdominal pain, nausea, vomiting, diarrhea, constipation, or  symptoms.     On exam she is awake alert and oriented x 3 in no acute distress, lungs are CTA, heart rate and rhythm are regular, normal, with +2 palpable peripheral pulses, no edema, soft, round, nondistended, nontender with audible bowel sounds throughout.  Noted open wound " "on great toe of left foot.  There is no drainage appreciated, the wound bed is dry, and black, tissue surrounding the would I dry, thick, rolled, and callused.      Initial evaluation in the emergency room today reveals she is hemodynamically stable blood pressure 144/82, heart rate 87, respiratory rate 18, oxygen saturation 96% on room air, she is afebrile with a Tmax of 97.4  She is hyperglycemic with a glucose of 447, she has elevated BUN 33, CRP 0.51, lactate is 3.4 x-ray right foot shows soft tissue swelling without evidence of osteomyelitis or acute osseous abnormalities.  Blood cultures and wound cultures are pending.  She was initiated on IV Zosyn, vancomycin, 1 L normal saline, and 8 units of IV regular insulin.\"    Hospital Course     Pt admitted for right diabetic foot ulcer.  Seen in consultation with podiatry.  She underwent bedside debridement on 2/25/2024, with no evidence of osteomyelitis or deep infection.  Plan to discharge on Keflex, with postop shoe and weightbearing as tolerated.  She will follow-up with podiatry on Friday of this week for continued management.  Evaluated by physical therapy and will be given rolling walker and discharged with home health.  Additionally she needs close follow-up with PCP for continued management of her poorly controlled DM2. Mealtime insulin has been added to her regimen for now, with adjustments to be made in OP setting. Pt has CGM which will help tremendously with monitoring moving forward.    Day of Discharge     Physical Exam:  Temp:  [97.5 °F (36.4 °C)-98.6 °F (37 °C)] 97.5 °F (36.4 °C)  Heart Rate:  [74-90] 74  Resp:  [16-18] 16  BP: (124-146)/(74-86) 137/81  Body mass index is 30.24 kg/m².  Physical Exam  Constitutional:       Appearance: Normal appearance. She is obese. She is ill-appearing.   Cardiovascular:      Rate and Rhythm: Normal rate.      Pulses: Normal pulses.   Pulmonary:      Effort: Pulmonary effort is normal.      Breath sounds: Normal " breath sounds.   Abdominal:      General: Abdomen is flat.      Palpations: Abdomen is soft.   Skin:     Comments: Left foot in postop shoe; left great toe wrapped in dressing, no exudate   Neurological:      Mental Status: She is alert.         Consultants     Consult Orders (all) (From admission, onward)       Start     Ordered    02/23/24 2202  Inpatient Podiatry Consult  Once        Specialty:  Podiatry  Provider:  Srini Khalil MD    02/23/24 2202 02/23/24 2132  Inpatient Case Management  Consult  Once        Provider:  (Not yet assigned)    02/23/24 2202 02/23/24 1819  Inpatient Diabetes Educator Consult  Once        Provider:  (Not yet assigned)    02/23/24 1818 02/23/24 1753  [facility] (on-call MD unless specified) Details  Once        Specialty:  Hospitalist  Provider:  Alejandro Krishnan MD    02/23/24 1752                  Procedures     * Surgery not found *      Imaging Results (All)       Procedure Component Value Units Date/Time    XR Foot 3+ View Right [[id number]] Collected: 02/23/24 1656     Updated: 02/23/24 1659    Narrative:      RIGHT FOOT: AP, LATERAL, OBLIQUE     HISTORY: Right great toe cellulitis. Ulcer.     COMPARISON: None.     FINDINGS: Midfoot alignment appears within normal limits.  There is no  evidence for fracture or acute osseous abnormality. There is mild soft  tissue swelling. Chronic calcification extends along the course of the  proximal plantar fascia.  Heel spurs are present. There are mild  arthritic changes at the 1st MTP joint.        Impression:      Soft tissue swelling without radiographic evidence for  osteomyelitis or acute osseous abnormality.        This report was finalized on 2/23/2024 4:56 PM by Dr. Heraclio Bustos M.D on Workstation: [id number]                 Pertinent Labs     Results from last 7 days   Lab Units 02/26/24  0443 02/25/24  0505 02/24/24  0313 02/23/24  1701   WBC 10*3/mm3 7.14 5.42 6.86 8.20   HEMOGLOBIN g/dL 12.6 12.9 12.8  "13.3   PLATELETS 10*3/mm3 197 184 195 205     Results from last 7 days   Lab Units 02/26/24 0443 02/25/24  0505 02/24/24 0313 02/23/24  1701   SODIUM mmol/L 136 140 137 133*   POTASSIUM mmol/L 4.1 4.3 3.9 5.1   CHLORIDE mmol/L 103 106 104 98   CO2 mmol/L 23.6 26.0 22.0 21.5*   BUN mg/dL 21* 21* 29* 33*   CREATININE mg/dL 0.98 0.91 0.91 0.92   GLUCOSE mg/dL 383* 274* 294* 447*   EGFR mL/min/1.73 66.6 72.8 72.8 71.9     Results from last 7 days   Lab Units 02/23/24  1701   ALBUMIN g/dL 4.0   BILIRUBIN mg/dL 0.3   ALK PHOS U/L 148*   AST (SGOT) U/L 14   ALT (SGPT) U/L 26     Results from last 7 days   Lab Units 02/26/24 0443 02/25/24  0505 02/24/24 0313 02/23/24  1701   CALCIUM mg/dL 8.7 9.3 9.0 10.0   ALBUMIN g/dL  --   --   --  4.0               Invalid input(s): \"LDLCALC\"  Results from last 7 days   Lab Units 02/23/24 2041 02/23/24  1816   BLOODCX   --  No growth at 2 days  No growth at 2 days   WOUNDCX  Scant growth (1+) Staphylococcus aureus*  Scant growth (1+) Normal Skin Madelaine  --          Test Results Pending at Discharge     Pending Labs       Order Current Status    Blood Culture - Blood, Arm, Left Preliminary result    Blood Culture - Blood, Arm, Left Preliminary result            Discharge Details        Discharge Medications        New Medications        Instructions Start Date   cephalexin 500 MG capsule  Commonly known as: Keflex   500 mg, Oral, 4 Times Daily      Insulin Lispro (1 Unit Dial) 100 UNIT/ML solution pen-injector  Commonly known as: HumaLOG KwikPen   5 Units, Subcutaneous, 3 Times Daily With Meals, Formulary Compliance Approval      Pen Needles 32G X 4 MM misc   1 each, Subcutaneous, 4 Times Daily PRN, Formulary Compliance Approval             Continue These Medications        Instructions Start Date   amitriptyline 25 MG tablet  Commonly known as: ELAVIL   75 mg, Oral, Nightly      baclofen 10 MG tablet  Commonly known as: LIORESAL   10 mg, Oral, 2 Times Daily      cetirizine 10 MG " "tablet  Commonly known as: zyrTEC   10 mg, Oral, Daily PRN      DULoxetine 30 MG capsule  Commonly known as: CYMBALTA   60 mg, Oral, Daily      gabapentin 300 MG capsule  Commonly known as: NEURONTIN   300 mg, Oral, Nightly PRN      glipizide 10 MG tablet  Commonly known as: GLUCOTROL   10 mg, Oral, 2 Times Daily Before Meals      hydroCHLOROthiazide 25 MG tablet   25 mg, Oral, Daily      lisinopril 10 MG tablet  Commonly known as: PRINIVIL,ZESTRIL   20 mg, Oral, Daily      meloxicam 15 MG tablet  Commonly known as: MOBIC   15 mg, Oral, Daily      METFORMIN HCL PO   1,000 mg, Oral, 2 Times Daily      omeprazole 40 MG capsule  Commonly known as: priLOSEC   40 mg, Oral, Daily      rosuvastatin 10 MG tablet  Commonly known as: CRESTOR   10 mg, Oral, Daily      Soliqua 100-33 UNT-MCG/ML solution pen-injector injection  Generic drug: Insulin Glargine-Lixisenatide   50 Units, Subcutaneous, Daily             Stop These Medications      ibuprofen 600 MG tablet  Commonly known as: ADVIL,MOTRIN              Allergies   Allergen Reactions    Codeine Nausea And Vomiting    Percocet [Oxycodone-Acetaminophen] Nausea And Vomiting     Caused bad pain    Adhesive Tape Rash    Toradol [Ketorolac Tromethamine] Other (See Comments)     \"doesnt do anything\"   - DOESN'T WORK PER PATIENT       Discharge Disposition:  Home or Self Care      Discharge Diet:  Diet Order   Procedures    Diet: Cardiac Diets, Diabetic Diets; Healthy Heart (2-3 Na+); Consistent Carbohydrate; Texture: Regular Texture (IDDSI 7); Fluid Consistency: Thin (IDDSI 0)       Discharge Activity:   Activity Instructions       Activity as Tolerated      Other Activity Instructions      Activity Instructions: L foot WBAT in post-op shoe            CODE STATUS:    Code Status and Medical Interventions:   Ordered at: 02/23/24 2043     Code Status (Patient has no pulse and is not breathing):    CPR (Attempt to Resuscitate)     Medical Interventions (Patient has pulse or is " breathing):    Full Support       No future appointments.   Follow-up Information       López Zapata DPM. Schedule an appointment as soon as possible for a visit in 2 week(s).    Specialty: Podiatry  Contact information:  [address] [facility] [place] [address]  [phone]               Donato Vilchis, DO Follow up in 1 week(s).    Specialty: Family Medicine  Contact information:  [address] [facility] [address]  [facility] [id number] [phone]                             Time Spent on Discharge:  Greater than 30 minutes spent on discharge management including final examination, discussion of hospital stay and patient education, preparation of records, medication reconciliation, follow up planning      Galo De La Torre MD  [facility]  02/26/24  08:14 EST

## 2024-02-26 NOTE — NURSING NOTE
1320 wrapped right great toe and foot applied betadine  painted to small open sore on medial toe wrapped with 4x4 and kerlex with tape well tolerated verbilized understanding on doing and how to do dressing change daily.

## 2024-02-26 NOTE — PLAN OF CARE
Goal Outcome Evaluation:  Plan of Care Reviewed With: patient        Progress: no change  Outcome Evaluation: Vitals stable overnight. Dressing intact to right toe.

## 2024-02-26 NOTE — PLAN OF CARE
Goal Outcome Evaluation:  Plan of Care Reviewed With: patient           Outcome Evaluation: Pt seen for PT kasandraal this am. She was admitted to [facility] on 2/23 w diabetic ulcer of R great toe. Pt has post op shoe and she is WBAT on R foot w post shoe on. At baseline, pt is independent w mobility and ADLs. She lives w he sig other. No steps to negotiate at home. Today, pt doing well. She is sitting EOB upon entry to room. Pt able to stand w SBA. She ambulated approx 100 ft w SBA using Rwx. No unsteadiness noted. Pt would like Rwx for home use. She does have a remote hx of falls. All questions answered at this time. No further acute PT needs. Pt is safe to DC home from PT standpoint. Pt to get walker prior to DC. Will sign off. Discussed w RN and CCP.      Anticipated Discharge Disposition (PT): home, home with assist

## 2024-02-26 NOTE — PLAN OF CARE
Goal Outcome Evaluation:           AVS completed and discharge instructions provided to patient. Infusion time for IV ax changed from 4 hours to 30 minutes. Change approved by pharmacy. Meds-to-bed delivered. Discharge pending walker drop off from [place]. Family will be here for transportation at 1130.

## 2024-02-26 NOTE — DISCHARGE PLACEMENT REQUEST
"Mariela Beck (58 y.o. Female)       Date of Birth   1965    Social Security Number       Address   [address] [place] [address]    Home Phone       MRN   [id number]       [employer]   [employer]    Marital Status   Single                            Admission Date   2/23/24    Admission Type   Emergency    Admitting Provider   Alejandro Krishnan MD    Attending Provider   Galo De La Torre MD    Department, Room/Bed   [address], [address]/1       Discharge Date       Discharge Disposition   Home or Self Care    Discharge Destination                                 Attending Provider: Galo De La Torre MD    Allergies: Codeine, Percocet [Oxycodone-acetaminophen], Adhesive Tape, Toradol [Ketorolac Tromethamine]    Isolation: None   Infection: None   Code Status: CPR    Ht: 162.6 cm (64\")   Wt: 79.9 kg (176 lb 2.4 oz)    Admission Cmt: None   Principal Problem: Diabetic ulcer of right great toe [E11.621,L97.519]                   Active Insurance as of 2/23/2024       Primary Coverage       Payor Plan Insurance Group Employer/Plan Group    ANTH MEDICAID [facility] MEDICAID [id number]       Payor Plan Address Payor Plan Phone Number Payor Plan Fax Number Effective Dates    [address] [address] [phone]  1/1/2024 - None Entered    [facility] [id number]         Subscriber Name Subscriber Birth Date Member ID       MARIELA BECK 1965 [id number]                     Emergency Contacts        (Rel.) Home Phone Work Phone Mobile Phone    Dolly Zayasa (Sister) -- -- [phone]    PIERO ARROYO (Daughter) -- -- [phone]    Barb Beck (Brother) [phone] -- --    BERTHA TRIPP (Significant Other) -- -- [phone]              "

## 2024-02-26 NOTE — THERAPY EVALUATION
Patient Name: Roxanna Beck  : 1965    MRN: [id number]                              Today's Date: 2024       Admit Date: 2024    Visit Dx:     ICD-10-CM ICD-9-CM   1. Diabetic foot infection  E11.628 250.80    L08.9 686.9   2. Type 2 diabetes mellitus with hyperglycemia, with long-term current use of insulin  E11.65 250.00    Z79.4 790.29     V58.67   3. Lactic acidosis  E87.20 276.2     Patient Active Problem List   Diagnosis    Lumbar disc herniation    Migraine    Hyperglycemia    Diabetic ulcer of right great toe    Diabetic peripheral neuropathy    Generalized anxiety disorder    Insulin dependent type 2 diabetes mellitus    Migraine headache    Hypertension    Hyperglycemia due to diabetes mellitus     Past Medical History:   Diagnosis Date    Arthritis     Depression     Diabetes mellitus     checks sugar once per day     Dizzy     Fibromyalgia     GERD (gastroesophageal reflux disease)     History of kidney stones     HNP (herniated nucleus pulposus)     Hyperlipidemia     Hypertension     Wears glasses     readers     Past Surgical History:   Procedure Laterality Date    BACK SURGERY      L4-5 Discectomy    CARPAL TUNNEL RELEASE      left     SECTION      CHOLECYSTECTOMY      COLONOSCOPY      ENDOSCOPY      HYSTERECTOMY      KIDNEY STONE SURGERY      LUMBAR DISCECTOMY N/A 12/15/2016    Procedure: LUMBAR DISCECTOMY L3-4;  Surgeon: Sandro Cuenca MD;  Location: [facility];  Service:     OOPHORECTOMY      TONSILLECTOMY      WISDOM TOOTH EXTRACTION        General Information       Row Name 24 0906          Physical Therapy Time and Intention    Document Type evaluation;discharge evaluation/summary  -EJ     Mode of Treatment physical therapy  -EJ       Row Name 24 0906          General Information    Patient Profile Reviewed yes  -EJ     Prior Level of Function independent:;all household mobility;community mobility;ADL's  -EJ     Existing Precautions/Restrictions no known  precautions/restrictions  WBAT R foot w post op shoe  -EJ     Barriers to Rehab none identified  -EJ       Row Name 02/26/24 0906          Living Environment    People in Home significant other  -EJ       Row Name 02/26/24 0906          Home Main Entrance    Number of Stairs, Main Entrance none  -EJ       Row Name 02/26/24 0906          Stairs Within Home, Primary    Number of Stairs, Within Home, Primary none  -EJ       Row Name 02/26/24 0906          Cognition    Orientation Status (Cognition) oriented x 3  -EJ       Row Name 02/26/24 0906          Safety Issues, Functional Mobility    Impairments Affecting Function (Mobility) pain;strength  -EJ               User Key  (r) = Recorded By, (t) = Taken By, (c) = Cosigned By      Initials Name Provider Type    Katie Munguia, PT Physical Therapist                   Mobility       Row Name 02/26/24 0906          Bed Mobility    Comment, (Bed Mobility) sitting EOB  -EJ       [facility] Name 02/26/24 0906          Sit-Stand Transfer    Sit-Stand Mica (Transfers) standby assist  -EJ       Row Name 02/26/24 0906          Gait/Stairs (Locomotion)    [place]ica Level (Gait) standby assist  -EJ     Assistive Device (Gait) walker, front-wheeled  -EJ     Distance in Feet (Gait) 100  -EJ     Deviations/Abnormal Patterns (Gait) jeff decreased  -EJ     Comment, (Gait/Stairs) no unsteadiness noted  -EJ               User Key  (r) = Recorded By, (t) = Taken By, (c) = Cosigned By      Initials Name Provider Type    Katie Munguia, PT Physical Therapist                   Obj/Interventions       Row Name 02/26/24 0907          Range of Motion Comprehensive    General Range of Motion no range of motion deficits identified  -EJ       Row Name 02/26/24 0907          Strength Comprehensive (MMT)    Comment, General Manual Muscle Testing (MMT) Assessment generalized weakness  -EJ               User Key  (r) = Recorded By, (t) = Taken By, (c) = Cosigned By      Initials  Name Provider Type    Katie Munguia, PT Physical Therapist                   Goals/Plan    No documentation.                  Clinical Impression       Row Name 02/26/24 0907          Pain    Pretreatment Pain Rating 6/10  -EJ     Pain Location - Side/Orientation Right  -EJ     Pain Location - foot  -EJ     Pain Intervention(s) Repositioned;Ambulation/increased activity  -EJ       Row Name 02/26/24 0907          Plan of Care Review    Plan of Care Reviewed With patient  -EJ     Outcome Evaluation Pt seen for PT eval this am. She was admitted to [facility] on 2/23 w diabetic ulcer of R great toe. Pt has post op shoe and she is WBAT on R foot w post shoe on. At baseline, pt is independent w mobility and ADLs. She lives w he sig other. No steps to negotiate at home. Today, pt doing well. She is sitting EOB upon entry to room. Pt able to stand w SBA. She ambulated approx 100 ft w SBA using Rwx. No unsteadiness noted. Pt would like Rwx for home use. She does have a remote hx of falls. All questions answered at this time. No further acute PT needs. Pt is safe to DC home from PT standpoint. Pt to get walker prior to DC. Will sign off. Discussed w RN and CCP.  -EJ       Row Name 02/26/24 0907          Therapy Assessment/Plan (PT)    Criteria for Skilled Interventions Met (PT) no problems identified which require skilled intervention  -EJ     Therapy Frequency (PT) evaluation only  -EJ       Row Name 02/26/24 0907          Positioning and Restraints    Pre-Treatment Position in bed  -EJ     Post Treatment Position bed  -EJ     In Bed notified nsg;sitting EOB;call light within reach;encouraged to call for assist  -EJ               User Key  (r) = Recorded By, (t) = Taken By, (c) = Cosigned By      Initials Name Provider Type    Katie Munguia, PT Physical Therapist                   Outcome Measures       Row Name 02/26/24 0910          How much help from another person do you currently need...    Turning from your  back to your side while in flat bed without using bedrails? 4  -EJ     Moving from lying on back to sitting on the side of a flat bed without bedrails? 4  -EJ     Moving to and from a bed to a chair (including a wheelchair)? 4  -EJ     Standing up from a chair using your arms (e.g., wheelchair, bedside chair)? 4  -EJ     Climbing 3-5 steps with a railing? 3  -EJ     To walk in hospital room? 4  -EJ     AM-PAC 6 Clicks Score (PT) 23  -EJ     Highest Level of Mobility Goal 7 --> Walk 25 feet or more  -EJ               User Key  (r) = Recorded By, (t) = Taken By, (c) = Cosigned By      Initials Name Provider Type    Katie Munguia, PT Physical Therapist                                   PT Recommendation and Plan     Plan of Care Reviewed With: patient  Outcome Evaluation: Pt seen for PT eval this am. She was admitted to [facility] on 2/23 w diabetic ulcer of R great toe. Pt has post op shoe and she is WBAT on R foot w post shoe on. At baseline, pt is independent w mobility and ADLs. She lives w he sig other. No steps to negotiate at home. Today, pt doing well. She is sitting EOB upon entry to room. Pt able to stand w SBA. She ambulated approx 100 ft w SBA using Rwx. No unsteadiness noted. Pt would like Rwx for home use. She does have a remote hx of falls. All questions answered at this time. No further acute PT needs. Pt is safe to DC home from PT standpoint. Pt to get walker prior to DC. Will sign off. Discussed w RN and CCP.     Time Calculation:         PT Charges       Row Name 02/26/24 0910             Time Calculation    Start Time 0846  -EJ      Stop Time 0903  -EJ      Time Calculation (min) 17 min  -EJ      PT Received On 02/26/24  -EJ         Time Calculation- PT    Total Timed Code Minutes- PT 12 minute(s)  -EJ                User Key  (r) = Recorded By, (t) = Taken By, (c) = Cosigned By      Initials Name Provider Type    Katie Munguia, PT Physical Therapist                  Therapy Charges for  Today       Code Description Service Date Service Provider Modifiers Qty    06397546898 HC PT EVAL MOD COMPLEXITY 2 2/26/2024 Katie Molina, PT GP 1    58969151023 HC PT THER PROC EA 15 MIN 2/26/2024 Katie Molina, PT GP 1            PT G-Codes  AM-PAC 6 Clicks Score (PT): 23  PT Discharge Summary  Anticipated Discharge Disposition (PT): home, home with assist    Katie Molina, PT  2/26/2024

## 2024-02-26 NOTE — PROGRESS NOTES
Continued Stay Note  [facility]     Patient Name: Roxanna Beck  MRN: [id number]  Today's Date: 2/26/2024    Admit Date: 2/23/2024    Plan: Home with SO   Discharge Plan       Row Name 02/26/24 0930       Plan    Plan Home with SO    Plan Comments Per PT pt will need walker for home at [place].  Call placed to request WR and referral sent in [facility] and called to Haile with [facility].  Per Haile RW will be delivered to bedside.                   Discharge Codes    No documentation.                 Expected Discharge Date and Time       Expected Discharge Date Expected Discharge Time    Feb 26, 2024               PIERO Draper

## 2024-02-26 NOTE — PAYOR COMM NOTE
"Mariela Beck (58 y.o. Female)                            ATTENTION ;- AUTH  PENDING [id number]                          OBSERVATION TO IPATIENT ADMISSION                          REPLY TO [facility] DEPT  [phone] OR CALL    MIKAELA STONE LPN           Date of Birth   1965    Social Security Number       Address   [address] [address]    Home Phone       MRN   [id number]       Yarsanism   Oriental orthodox    Marital Status   Single                            Admission Date   2/23/24    Admission Type   Emergency    Admitting Provider   Alejandro Krishnan MD    Attending Provider   Galo De La Torre MD    Department, Room/Bed   [address], [address]/1       Discharge Date       Discharge Disposition   Home or Self Care    Discharge Destination                                 Attending Provider: Galo De La Torre MD    Allergies: Codeine, Percocet [Oxycodone-acetaminophen], Adhesive Tape, Toradol [Ketorolac Tromethamine]    Isolation: None   Infection: None   Code Status: CPR    Ht: 162.6 cm (64\")   Wt: 79.9 kg (176 lb 2.4 oz)    Admission Cmt: None   Principal Problem: Diabetic ulcer of right great toe [E11.621,L97.519]                   Active Insurance as of 2/23/2024       Primary Coverage       Payor Plan Insurance Group Employer/Plan Group    ANTHEM MEDICAID [place] MEDICAID [id number]       Payor Plan Address Payor Plan Phone Number Payor Plan Fax Number Effective Dates    [address] [address] [phone]  1/1/2024 - None Entered    [facility] [id number]         Subscriber Name Subscriber Birth Date Member ID       MARIELA BECK 1965 [id number]                     Emergency Contacts        (Rel.) Home Phone Work Phone Mobile Phone    Sarah Zayas (Sister) -- -- [phone]    PIERO ARROYO (Daughter) -- -- [phone]    Barb Beck (Brother) [phone] -- --    BERTHA TRIPP (Significant Other) -- -- [phone]      "            History & Physical        Awilda Gay APRN at 02/23/24 2044       Attestation signed by Alejandro Krishnan MD at 02/24/24 0403    I have personally interviewed and examined the patient in addition to reviewing the clinical data including labs, imaging, telemetry, and some medical records. I discussed with HEIDI Moreau in formulation of the plan. I have done a substantive portion of the medical decision making in this split/shared service. My history and physical examination findings confirm those documented in the note below unless otherwise indicated.   Patient a history of HTN, HLD, poorly controlled Type 2 DM, and GERD presenting with worsening right great toe erythema and swelling. She has been dealing with an ulcer on her right great toe for the past several weeks and recently she developed an erythema which extended to the dorsum of her foot. She was started on keflex and bactroban which did help the erythema but this did not completely resolve. We ill cover with IV vancomycin and zosyn and provide local wound care. Consult podiatry. Her blood glucose levels are uncontrolled with a recent A1C of 13%. We will resume her lantus at 50 units nightly and cover with ssi/hypoglycemia protocol and adjust from there. We will start liberal IV fluids.     I evaluated the patient on 2/23/24. Completion of this note was delayed due to patient care.                      Patient Name:  Roxanna Beck  YOB: 1965  MRN:  [id number]  Admit Date:  2/23/2024  Patient Care Team:  Donato Vilchis DO as PCP - General (Family Medicine)  Donato Vilchis DO as Consulting Physician (Family Medicine)  Jefferson Monk MD as Consulting Physician (Neurosurgery)  Asia Roger PA as Referring Physician (Family Medicine)  Donato Vilchis DO as Consulting Physician (Family Medicine)      Subjective  History Present Illness     Chief Complaint   Patient presents with    Wound Check      History of Present Illness  Ms. Beck is a 58 y o female with PMH of uncontrolled DM, GERD, HLD, HTN, and depression who presents to [facility] today with complaints of right toe redness, swelling and pain. She does endorse seeing her PCP three weeks ago who prescribed her keflex and bactroban without significant improvement. She states the area started as a large corn that has opened and intermittently draining, She states the redness has improved since starting on the Keflex it was initially covering the entire dorsal portion of her left foot.  It is now just at the base of her great toe. She does also endorse recent changes to her insulin with the addition of Lantus that has  improved her blood glucose levels. She denies any acute acute distress chest pain, shortness of breath, palpitations, lightheadedness, dizziness, fever, chills, cough, abdominal pain, nausea, vomiting, diarrhea, constipation, or  symptoms.    On exam she is awake alert and oriented x 3 in no acute distress, lungs are CTA, heart rate and rhythm are regular, normal, with +2 palpable peripheral pulses, no edema, soft, round, nondistended, nontender with audible bowel sounds throughout.  Noted open wound on great toe of left foot.  There is no drainage appreciated, the wound bed is dry, and black, tissue surrounding the would I dry, thick, rolled, and callused.     Initial evaluation in the emergency room today reveals she is hemodynamically stable blood pressure 144/82, heart rate 87, respiratory rate 18, oxygen saturation 96% on room air, she is afebrile with a Tmax of 97.4  She is hyperglycemic with a glucose of 447, she has elevated BUN 33, CRP 0.51, lactate is 3.4 x-ray right foot shows soft tissue swelling without evidence of osteomyelitis or acute osseous abnormalities.  Blood cultures and wound cultures are pending.  She was initiated on IV Zosyn, vancomycin, 1 L normal saline, and 8 units of IV regular  insulin.    Ms. Beck will be admitted for further evaluation and treatment of great toe infection that is resistant to antibiotics and other acute and or chronic conditions as needed.  Review of Systems   Constitutional:  Positive for activity change.   Eyes: Negative.    Respiratory: Negative.     Cardiovascular: Negative.    Gastrointestinal: Negative.    Endocrine: Negative.    Genitourinary: Negative.    Musculoskeletal: Negative.    Skin:  Positive for color change and wound.   Allergic/Immunologic: Negative.    Neurological: Negative.    Hematological: Negative.    Psychiatric/Behavioral: Negative.     All other systems reviewed and are negative.       Personal History     Past Medical History:   Diagnosis Date    Arthritis     Depression     Diabetes mellitus     checks sugar once per day     Dizzy     Fibromyalgia     GERD (gastroesophageal reflux disease)     History of kidney stones     HNP (herniated nucleus pulposus)     Hyperlipidemia     Hypertension     Wears glasses     readers     Past Surgical History:   Procedure Laterality Date    BACK SURGERY      L4-5 Discectomy    CARPAL TUNNEL RELEASE      left     SECTION      CHOLECYSTECTOMY      COLONOSCOPY      ENDOSCOPY      HYSTERECTOMY      KIDNEY STONE SURGERY      LUMBAR DISCECTOMY N/A 12/15/2016    Procedure: LUMBAR DISCECTOMY L3-4;  Surgeon: Sandro Cuenca MD;  Location: [facility];  Service:     OOPHORECTOMY      TONSILLECTOMY      WISDOM TOOTH EXTRACTION       Family History   Problem Relation Age of Onset    Lung cancer Mother     Cancer Mother     Heart attack Father     Heart disease Father     Diabetes Sister     Hypertension Sister     Breast cancer Sister 70    Diabetes Brother     Hypertension Brother     Heart disease Paternal Grandmother     Heart disease Paternal Grandfather     Breast cancer Cousin         early 50's    Breast cancer Maternal Aunt         50's    Breast cancer Maternal Aunt         50's    Breast cancer  Maternal Aunt         50's    Breast cancer Maternal Aunt         50's    Breast cancer Maternal Aunt         50's    Endometrial cancer Neg Hx     Ovarian cancer Neg Hx      Social History     Tobacco Use    Smoking status: Never    Smokeless tobacco: Never   Vaping Use    Vaping Use: Never used   Substance Use Topics    Alcohol use: No    Drug use: No     No current facility-administered medications on file prior to encounter.     Current Outpatient Medications on File Prior to Encounter   Medication Sig Dispense Refill    amitriptyline (ELAVIL) 25 MG tablet Take 3 tablets by mouth Every Night. 90 tablet 0    baclofen (LIORESAL) 10 MG tablet Take 1 tablet by mouth 2 (Two) Times a Day. 60 tablet 0    cetirizine (ZyrTEC) 10 MG tablet Take 1 tablet by mouth Daily As Needed for Allergies.      DULoxetine (CYMBALTA) 30 MG capsule Take 2 capsules by mouth Daily.      gabapentin (NEURONTIN) 300 MG capsule Take 1 capsule by mouth At Night As Needed (For pain).      glipiZIDE (GLUCOTROL) 10 MG tablet Take 1 tablet by mouth 2 (Two) Times a Day Before Meals.      hydroCHLOROthiazide 25 MG tablet Take 1 tablet by mouth Daily.      ibuprofen (ADVIL,MOTRIN) 600 MG tablet Take 1 tablet by mouth Every 8 (Eight) Hours As Needed for Moderate Pain . 21 tablet 0    Insulin Glargine-Lixisenatide (Soliqua) 100-33 UNT-MCG/ML solution pen-injector injection Inject 50 Units under the skin into the appropriate area as directed Daily.      lisinopril (PRINIVIL,ZESTRIL) 10 MG tablet Take 2 tablets by mouth Daily.      meloxicam (MOBIC) 15 MG tablet Take 1 tablet by mouth Daily.      METFORMIN HCL PO Take 1,000 mg by mouth 2 (two) times a day         omeprazole (PriLOSEC) 40 MG capsule Take 1 capsule by mouth Daily.      rosuvastatin (CRESTOR) 10 MG tablet Take 1 tablet by mouth Daily.      azithromycin (ZITHROMAX) 250 MG tablet Take 2 tablets the first day, then 1 tablet daily for 4 days. 6 tablet 0    methylPREDNISolone (MEDROL) 4 MG dose  "pack Take as directed on package instructions. 21 each 0    PARoxetine (PAXIL) 20 MG tablet Take 1 tablet by mouth Every Morning.      SUMAtriptan (Imitrex) 50 MG tablet Take one tablet at onset of headache. May repeat dose one time in 2 hours if headache not relieved. 9 tablet 0     Allergies   Allergen Reactions    Codeine Nausea And Vomiting    Percocet [Oxycodone-Acetaminophen] Nausea And Vomiting     Caused bad pain    Adhesive Tape Rash    Toradol [Ketorolac Tromethamine] Other (See Comments)     \"doesnt do anything\"   - DOESN'T WORK PER PATIENT       Objective   Objective     Vital Signs  Temp:  [97.4 °F (36.3 °C)-98.5 °F (36.9 °C)] 98.5 °F (36.9 °C)  Heart Rate:  [] 87  Resp:  [18] 18  BP: (144-170)/(82-94) 170/94  SpO2:  [95 %-98 %] 96 %  on   ;      Body mass index is 29.93 kg/m².    Physical Exam  Vitals and nursing note reviewed.   Constitutional:       General: She is awake.      Appearance: Normal appearance.   Skin:     General: Skin is warm and dry.      Capillary Refill: Capillary refill takes less than 2 seconds.      Findings: Erythema, lesion and wound present.          Neurological:      Mental Status: She is alert.   Psychiatric:         Behavior: Behavior is cooperative.         Results Review:  I reviewed the patient's new clinical results.  I reviewed the patient's new imaging results and agree with the interpretation.  I reviewed the patient's other test results and agree with the interpretation  I personally viewed and interpreted the patient's EKG/Telemetry data  Discussed with ED provider.    Lab Results (last 24 hours)       Procedure Component Value Units Date/Time    CBC & Differential [[id number]]  (Abnormal) Collected: 02/23/24 1701    Specimen: Blood Updated: 02/23/24 1713    Narrative:      The following orders were created for panel order CBC & Differential.  Procedure                               Abnormality         Status                     ---------                      "          -----------         ------                     CBC Auto Differential[[id number]]        Abnormal            Final result                 Please view results for these tests on the individual orders.    Comprehensive Metabolic Panel [[id number]]  (Abnormal) Collected: 02/23/24 1701    Specimen: Blood Updated: 02/23/24 1740     Glucose 447 mg/dL      BUN 33 mg/dL      Creatinine 0.92 mg/dL      Sodium 133 mmol/L      Potassium 5.1 mmol/L      Chloride 98 mmol/L      CO2 21.5 mmol/L      Calcium 10.0 mg/dL      Total Protein 6.9 g/dL      Albumin 4.0 g/dL      ALT (SGPT) 26 U/L      AST (SGOT) 14 U/L      Alkaline Phosphatase 148 U/L      Total Bilirubin 0.3 mg/dL      Globulin 2.9 gm/dL      A/G Ratio 1.4 g/dL      BUN/Creatinine Ratio 35.9     Anion Gap 13.5 mmol/L      eGFR 71.9 mL/min/1.73     Narrative:      GFR Normal >60  Chronic Kidney Disease <60  Kidney Failure <15      Lactic Acid, Plasma [[id number]]  (Abnormal) Collected: 02/23/24 1701    Specimen: Blood Updated: 02/23/24 1728     Lactate 3.4 mmol/L     Sedimentation Rate [[id number]]  (Normal) Collected: 02/23/24 1701    Specimen: Blood Updated: 02/23/24 1736     Sed Rate 20 mm/hr     C-reactive Protein [[id number]]  (Abnormal) Collected: 02/23/24 1701    Specimen: Blood Updated: 02/23/24 1730     C-Reactive Protein 0.51 mg/dL     CBC Auto Differential [[id number]]  (Abnormal) Collected: 02/23/24 1701    Specimen: Blood Updated: 02/23/24 1713     WBC 8.20 10*3/mm3      RBC 4.31 10*6/mm3      Hemoglobin 13.3 g/dL      Hematocrit 39.2 %      MCV 91.0 fL      MCH 30.9 pg      MCHC 33.9 g/dL      RDW 11.9 %      RDW-SD 39.7 fl      MPV 9.9 fL      Platelets 205 10*3/mm3      Neutrophil % 68.5 %      Lymphocyte % 19.8 %      Monocyte % 8.7 %      Eosinophil % 2.3 %      Basophil % 0.5 %      Immature Grans % 0.2 %      Neutrophils, Absolute 5.62 10*3/mm3      Lymphocytes, Absolute 1.62 10*3/mm3      Monocytes, Absolute 0.71 10*3/mm3      Eosinophils,  Absolute 0.19 10*3/mm3      Basophils, Absolute 0.04 10*3/mm3      Immature Grans, Absolute 0.02 10*3/mm3      nRBC 0.0 /100 WBC     Blood Culture - Blood, Arm, Left [[id number]] Collected: 02/23/24 1816    Specimen: Blood from Arm, Left Updated: 02/23/24 1822    Blood Culture - Blood, Arm, Left [[id number]] Collected: 02/23/24 1816    Specimen: Blood from Arm, Left Updated: 02/23/24 1822    Wound Culture - Swab, Toe, Right [[id number]] Collected: 02/23/24 2041    Specimen: Swab from Toe, Right Updated: 02/23/24 2047    STAT Lactic Acid, Reflex [[id number]]  (Abnormal) Collected: 02/23/24 2056    Specimen: Blood Updated: 02/23/24 2149     Lactate 2.1 mmol/L             Imaging Results (Last 24 Hours)       Procedure Component Value Units Date/Time    XR Foot 3+ View Right [[id number]] Collected: 02/23/24 1656     Updated: 02/23/24 1659    Narrative:      RIGHT FOOT: AP, LATERAL, OBLIQUE     HISTORY: Right great toe cellulitis. Ulcer.     COMPARISON: None.     FINDINGS: Midfoot alignment appears within normal limits.  There is no  evidence for fracture or acute osseous abnormality. There is mild soft  tissue swelling. Chronic calcification extends along the course of the  proximal plantar fascia.  Heel spurs are present. There are mild  arthritic changes at the 1st MTP joint.        Impression:      Soft tissue swelling without radiographic evidence for  osteomyelitis or acute osseous abnormality.        This report was finalized on 2/23/2024 4:56 PM by Dr. Heraclio Bustos M.D on Workstation: [id number]                   No orders to display        Assessment/Plan     Active Hospital Problems    Diagnosis  POA    **Hyperglycemia [R73.9]  Yes    Diabetic ulcer of right great toe [E11.621, L97.519]  Yes    Lactic acidosis [E87.20]  Yes    Insulin dependent type 2 diabetes mellitus [E11.9, Z79.4]  Not Applicable    Generalized anxiety disorder [F41.1]  Yes    Diabetic peripheral neuropathy [E11.42]  Yes      Resolved  Hospital Problems   No resolved problems to display.     Hyperglycemia  Insulin dependent type 2 diabetes mellitus  Chronic   447 baseline  Accu-Cheks ACHS  SSI ordered for hyperglycemia  Hypoglycemia treatment per protocol  Continue home Lantus  Diabetes educator consulted  Admit to telemetry unit continuous cardiac monitoring pulse oximetry  Hold home metformin,  Lactic acidosis   Initially 3.4 down to 2.1 after fluid bolus   Continue IV fluids   Likely Secondary to great toe infection  Sed rate 20, CRP 0.61    Diabetic ulcer of right great toe  Diabetic peripheral neuropathy  3 week history   Prior treatment with Keflex  Consulted to dose vancomycin  Continue Zosyn  X ray unremarkable for osteomyelitis, or osseous abnormalities  Consulted for possible wound debridement  Blood cultures are pending  Continue home gabapentin    Essential Hypertension   Chronic  Continue home lisinopril  and hydrochlorothiazide  Vital Signs     Fibromyalgia  Chronic   Continue home Cymbalta, and baclofen    Migraines   Chronic   Continue Imitrex     Generalized anxiety disorder  Chronic  Continue home lisinopril, Paxil            I discussed the patient's findings and my recommendations with patient.    VTE Prophylaxis - SCDs.  Code Status - Full code.       HEIDI Castañeda  [facility]  02/23/24  22:30 EST    Electronically signed by Alejandro Krishnan MD at 02/24/24 0403          Emergency Department Notes        Jessica Broderick, RN at 02/23/24 8355          Nursing report ED to floor  Roxanna Beck  58 y.o.  female    HPI :   HPI (Adult)  Stated Reason for Visit: wound check    Chief Complaint  Chief Complaint   Patient presents with    Wound Check       Admitting doctor:   Alejandro Krishnan MD    Admitting diagnosis:   The primary encounter diagnosis was Diabetic foot infection. Diagnoses of Type 2 diabetes mellitus with hyperglycemia, with long-term current use of insulin and Lactic acidosis were  also pertinent to this visit.    Code status:   Current Code Status       Date Active Code Status Order ID Comments User Context       Prior            Allergies:   Codeine, Percocet [oxycodone-acetaminophen], Adhesive tape, and Toradol [ketorolac tromethamine]    Isolation:   No active isolations    Intake and Output  No intake or output data in the 24 hours ending 02/23/24 1855    Weight:       02/23/24  1740   Weight: 77.9 kg (171 lb 11.8 oz)       Most recent vitals:   Vitals:    02/23/24 1609 02/23/24 1740 02/23/24 1742 02/23/24 1849   BP:       Pulse: 100  93 92   Resp:       Temp:       SpO2: 95%  96% 96%   Weight:  77.9 kg (171 lb 11.8 oz)         Active LDAs/IV Access:   Lines, Drains & Airways       Active LDAs       Name Placement date Placement time Site Days    Peripheral IV 02/23/24 1701 Anterior;Left Forearm 02/23/24  1701  Forearm  less than 1                    Labs (abnormal labs have a star):   Labs Reviewed   COMPREHENSIVE METABOLIC PANEL - Abnormal; Notable for the following components:       Result Value    Glucose 447 (*)     BUN 33 (*)     Sodium 133 (*)     CO2 21.5 (*)     Alkaline Phosphatase 148 (*)     BUN/Creatinine Ratio 35.9 (*)     All other components within normal limits    Narrative:     GFR Normal >60  Chronic Kidney Disease <60  Kidney Failure <15     LACTIC ACID, PLASMA - Abnormal; Notable for the following components:    Lactate 3.4 (*)     All other components within normal limits   C-REACTIVE PROTEIN - Abnormal; Notable for the following components:    C-Reactive Protein 0.51 (*)     All other components within normal limits   CBC WITH AUTO DIFFERENTIAL - Abnormal; Notable for the following components:    RDW 11.9 (*)     All other components within normal limits   SEDIMENTATION RATE - Normal   BLOOD CULTURE   BLOOD CULTURE   WOUND CULTURE   LACTIC ACID, REFLEX   POCT GLUCOSE FINGERSTICK   POCT GLUCOSE FINGERSTICK   POCT GLUCOSE FINGERSTICK   POCT GLUCOSE FINGERSTICK   CBC  AND DIFFERENTIAL    Narrative:     The following orders were created for panel order CBC & Differential.  Procedure                               Abnormality         Status                     ---------                               -----------         ------                     CBC Auto Differential[[id number]]        Abnormal            Final result                 Please view results for these tests on the individual orders.       EKG:   No orders to display       Meds given in ED:   Medications   sodium chloride 0.9 % bolus 1,000 mL (has no administration in time range)   piperacillin-tazobactam (ZOSYN) 3.375 g in iso-osmotic dextrose 50 ml (premix) (3.375 g Intravenous New Bag 2/23/24 2222)   vancomycin IVPB 1500 mg in 0.9% NaCl (Premix) 500 mL (has no administration in time range)   sodium chloride 0.9 % flush 10 mL (has no administration in time range)   sodium chloride 0.9 % flush 10 mL (has no administration in time range)   sodium chloride 0.9 % infusion 40 mL (has no administration in time range)   dextrose (GLUTOSE) oral gel 15 g (has no administration in time range)   dextrose (D50W) (25 g/50 mL) IV injection 25 g (has no administration in time range)   glucagon (GLUCAGEN) injection 1 mg (has no administration in time range)   Insulin Lispro (humaLOG) injection 3-14 Units (has no administration in time range)   nitroglycerin (NITROSTAT) SL tablet 0.4 mg (has no administration in time range)   sennosides-docusate (PERICOLACE) 8.6-50 MG per tablet 2 tablet (has no administration in time range)     And   polyethylene glycol (MIRALAX) packet 17 g (has no administration in time range)     And   bisacodyl (DULCOLAX) EC tablet 5 mg (has no administration in time range)     And   bisacodyl (DULCOLAX) suppository 10 mg (has no administration in time range)   Pharmacy to dose vancomycin (has no administration in time range)   piperacillin-tazobactam (ZOSYN) 3.375 g in iso-osmotic dextrose 50 ml (premix) (has no  administration in time range)   insulin regular (humuLIN R,novoLIN R) injection 8 Units (8 Units Intravenous Given 2/23/24 1808)       Imaging results:  XR Foot 3+ View Right    Result Date: 2/23/2024  Soft tissue swelling without radiographic evidence for osteomyelitis or acute osseous abnormality.   This report was finalized on 2/23/2024 4:56 PM by Dr. Heraclio Bustos M.D on Workstation: [id number]       Ambulatory status:   Partial assist    Social issues:   Social History     Socioeconomic History    Marital status: Single    Number of children: 1    Years of education: High School   Tobacco Use    Smoking status: Never    Smokeless tobacco: Never   Substance and Sexual Activity    Alcohol use: No    Drug use: No    Sexual activity: Defer       NIH Stroke Scale:       Jessica Broderick RN  02/23/24 18:55 EST          Electronically signed by Jessica Brodreick RN at 02/23/24 7528       López Vo MD at 02/23/24 1811          MD ATTESTATION NOTE     SHARED VISIT: This visit was performed by BOTH a physician and an APC. The substantive portion of the medical decision making was performed by this attesting physician who made or approved the management plan and takes responsibility for patient management. All studies in the APC note (if performed) were independently interpreted by me.  The MG and I have discussed this patient's history, physical exam, and treatment plan. I have reviewed the documentation and personally had a face to face interaction with the patient. I affirm the documentation and agree with the treatment and plan. I provided a substantive portion of the care of the patient.  I personally performed the physical exam in its entirety, and below are my findings.      Brief HPI: Patient complains of a right toe wound for the past 3 weeks.  She initially had some redness in her foot as well but this resolved after a 10-day course of antibiotics.  She denies fever or chills.  She has a history of  diabetes and peripheral neuropathy.  Denies history of peripheral vascular disease.    PHYSICAL EXAM  ED Triage Vitals   Temp Heart Rate Resp BP SpO2   02/23/24 1504 02/23/24 1504 02/23/24 1504 02/23/24 1513 02/23/24 1504   97.4 °F (36.3 °C) 116 18 144/82 98 %      Temp src Heart Rate Source Patient Position BP Location FiO2 (%)   -- -- -- -- --                GENERAL: Awake, alert, oriented x 3.  Nontoxic-appearing female.  Resting comfortably in no acute distress  HENT: nares patent  EYES: no scleral icterus  CV: regular rhythm, normal rate, equal pedal pulses bilaterally  RESPIRATORY: normal effort, clear to auscultation bilaterally  ABDOMEN: soft, nontender  MUSCULOSKELETAL: There is diffuse tenderness of the right great toe  NEURO: alert, moves all extremities, follows commands  PSYCH:  calm, cooperative  SKIN: There is erythema and warmth of the right great toe.  There is a linear wound on the plantar aspect of the left great toe.  No lymphangitis.    Vital signs and nursing notes reviewed.        Plan: Obtain labs and right foot x-rays.    Right foot x-ray personally interpreted by me.  My personal interpretation is: There is soft tissue swelling of the right great toe.  No fracture.  No dislocation.  No bony erosion.    MDM: Patient presented to ED with a right great toe wound for the past 3 weeks.  She is a diabetic.  Lactic acid and CRP were elevated.  X-ray did not show any evidence of osteomyelitis.  Glucose was 457.  Patient was given insulin and will be started on IV antibiotics.  She will be admitted.    ED Course as of 02/23/24 1936 Fri Feb 23, 2024   1735 Lactate(!!): 3.4 [KA]   1735 C-Reactive Protein(!): 0.51 [KA]   1735 WBC: 8.20 [KA]   1735 Hemoglobin: 13.3 [KA]   1748 Glucose(!!): 447 [KA]   1748 Creatinine: 0.92 [KA]   1804 IV fluids for the patient's hyperglycemia and lactic acidosis as well as a dose of IV insulin.  She has a normal white blood cell count and is afebrile but elevated  lactate and CRP with obvious infection which has failed outpatient antibiotics.  I think in light of her persistent infection and severe hyperglycemia recommend admission for IV antibiotics, glycemic control.  She is agreeable. [KA]   1833 I discussed patient with Dr. Krishnan, hospitalist including history presentation workup and he agrees to admit for further evaluation and treatment. [KA]      ED Course User Index  [KA] Asia Wong PA-C Holland, William D, MD  02/23/24 1814       López Vo MD  02/23/24 1936      Electronically signed by López Vo MD at 02/23/24 1936       Asia Wong PA-C at 02/23/24 1610       Attestation signed by López Vo MD at 02/23/24 1935        SHARED APC FACE TO FACE: I performed a substantive part of the MDM during the patient's E/M visit. I personally evaluated and examined the patient. I personally made or approved the documented management plan and acknowledge its risk of complications.   López Vo MD 2/23/2024 19:35 EST                          EMERGENCY DEPARTMENT ENCOUNTER  Room Number:  03/03  PCP: Donato Vilchis DO  Independent Historians: Patient      HPI:  Chief Complaint: had concerns including Wound Check.       A complete HPI/ROS/PMH/PSH/SH/FH are unobtainable due to: None    Chronic or social conditions impacting patient care (Social Determinants of Health): None      Context: Roxanna Beck is a 58 y.o. female with a medical history o insulin-dependentf diabetes, neuropathy who presents to the ED c/o acute right toe redness swelling and occasional pain.  She states that 3 weeks ago she was having similar symptoms and also redness in her foot, saw her PCP and was prescribed antibiotics for 10 days as well as a topical ointment.  She states the redness in her foot improved but the toe did not and it remains red and is getting progressively more red and swollen and the wound on the bottom of it has gotten bigger  and more deep.  She denies any fevers chills nausea or vomiting.  She states her Lantus was recently increased and her blood sugar has been more controlled since.  She sees a PCP in [place] where she used to live.      Review of prior external notes (non-ED) -and- Review of prior external test results outside of this encounter:  Office visit 2024 [facility] primary care.  She was therefore annual exam, also noticed a wound to her toe with some redness and warmth and peeling, she was prescribed Keflex and Bactroban.  Insulin was adjusted.  Hemoglobin A1c that day was 13.8.        PAST MEDICAL HISTORY  Active Ambulatory Problems     Diagnosis Date Noted    Lumbar disc herniation 12/15/2016    Migraine 2021     Resolved Ambulatory Problems     Diagnosis Date Noted    No Resolved Ambulatory Problems     Past Medical History:   Diagnosis Date    Arthritis     Depression     Diabetes mellitus     Dizzy     Fibromyalgia     GERD (gastroesophageal reflux disease)     History of kidney stones     HNP (herniated nucleus pulposus)     Hyperlipidemia     Hypertension     Wears glasses          PAST SURGICAL HISTORY  Past Surgical History:   Procedure Laterality Date    BACK SURGERY      L4-5 Discectomy    CARPAL TUNNEL RELEASE      left     SECTION      CHOLECYSTECTOMY      COLONOSCOPY      ENDOSCOPY      HYSTERECTOMY      KIDNEY STONE SURGERY      LUMBAR DISCECTOMY N/A 12/15/2016    Procedure: LUMBAR DISCECTOMY L3-4;  Surgeon: Sandro Cuenca MD;  Location: [facility];  Service:     OOPHORECTOMY      TONSILLECTOMY      WISDOM TOOTH EXTRACTION           FAMILY HISTORY  Family History   Problem Relation Age of Onset    Lung cancer Mother     Cancer Mother     Heart attack Father     Heart disease Father     Diabetes Sister     Hypertension Sister     Breast cancer Sister 70    Diabetes Brother     Hypertension Brother     Heart disease Paternal Grandmother     Heart disease Paternal Grandfather      Breast cancer Cousin         early 50's    Breast cancer Maternal Aunt         50's    Breast cancer Maternal Aunt         50's    Breast cancer Maternal Aunt         50's    Breast cancer Maternal Aunt         50's    Breast cancer Maternal Aunt         50's    Endometrial cancer Neg Hx     Ovarian cancer Neg Hx          SOCIAL HISTORY  Social History     Socioeconomic History    Marital status: Single    Number of children: 1    Years of education: High School   Tobacco Use    Smoking status: Never    Smokeless tobacco: Never   Substance and Sexual Activity    Alcohol use: No    Drug use: No    Sexual activity: Defer         ALLERGIES  Codeine, Percocet [oxycodone-acetaminophen], Adhesive tape, and Toradol [ketorolac tromethamine]      REVIEW OF SYSTEMS  Review of Systems  Included in HPI  All systems reviewed and negative except for those discussed in HPI.      PHYSICAL EXAM    I have reviewed the triage vital signs and nursing notes.    ED Triage Vitals   Temp Heart Rate Resp BP SpO2   02/23/24 1504 02/23/24 1504 02/23/24 1504 02/23/24 1513 02/23/24 1504   97.4 °F (36.3 °C) 116 18 144/82 98 %      Temp src Heart Rate Source Patient Position BP Location FiO2 (%)   -- -- -- -- --              Physical Exam  GENERAL: alert, no acute distress  SKIN: Warm, dry  HENT: Normocephalic, atraumatic  EYES: no scleral icterus  CV: regular rhythm, regular rate  RESPIRATORY: normal effort, lungs clear  ABDOMEN: nondistended nontender  MUSCULOSKELETAL: no deformity.  On inspection of the right lower extremity there is edema and erythema to the great toe.  There is a ulceration on the plantar aspect.  There is some mild tenderness though she has very decreased feeling in her feet and toes.  Cap refills brisk.  No lymphangitis.  NEURO: alert, moves all extremities, follows commands            LAB RESULTS  Recent Results (from the past 24 hour(s))   Comprehensive Metabolic Panel    Collection Time: 02/23/24  5:01 PM     Specimen: Blood   Result Value Ref Range    Glucose 447 (C) 65 - 99 mg/dL    BUN 33 (H) 6 - 20 mg/dL    Creatinine 0.92 0.57 - 1.00 mg/dL    Sodium 133 (L) 136 - 145 mmol/L    Potassium 5.1 3.5 - 5.2 mmol/L    Chloride 98 98 - 107 mmol/L    CO2 21.5 (L) 22.0 - 29.0 mmol/L    Calcium 10.0 8.6 - 10.5 mg/dL    Total Protein 6.9 6.0 - 8.5 g/dL    Albumin 4.0 3.5 - 5.2 g/dL    ALT (SGPT) 26 1 - 33 U/L    AST (SGOT) 14 1 - 32 U/L    Alkaline Phosphatase 148 (H) 39 - 117 U/L    Total Bilirubin 0.3 0.0 - 1.2 mg/dL    Globulin 2.9 gm/dL    A/G Ratio 1.4 g/dL    BUN/Creatinine Ratio 35.9 (H) 7.0 - 25.0    Anion Gap 13.5 5.0 - 15.0 mmol/L    eGFR 71.9 >60.0 mL/min/1.73   Lactic Acid, Plasma    Collection Time: 02/23/24  5:01 PM    Specimen: Blood   Result Value Ref Range    Lactate 3.4 (C) 0.5 - 2.0 mmol/L   Sedimentation Rate    Collection Time: 02/23/24  5:01 PM    Specimen: Blood   Result Value Ref Range    Sed Rate 20 0 - 30 mm/hr   C-reactive Protein    Collection Time: 02/23/24  5:01 PM    Specimen: Blood   Result Value Ref Range    C-Reactive Protein 0.51 (H) 0.00 - 0.50 mg/dL   CBC Auto Differential    Collection Time: 02/23/24  5:01 PM    Specimen: Blood   Result Value Ref Range    WBC 8.20 3.40 - 10.80 10*3/mm3    RBC 4.31 3.77 - 5.28 10*6/mm3    Hemoglobin 13.3 12.0 - 15.9 g/dL    Hematocrit 39.2 34.0 - 46.6 %    MCV 91.0 79.0 - 97.0 fL    MCH 30.9 26.6 - 33.0 pg    MCHC 33.9 31.5 - 35.7 g/dL    RDW 11.9 (L) 12.3 - 15.4 %    RDW-SD 39.7 37.0 - 54.0 fl    MPV 9.9 6.0 - 12.0 fL    Platelets 205 140 - 450 10*3/mm3    Neutrophil % 68.5 42.7 - 76.0 %    Lymphocyte % 19.8 19.6 - 45.3 %    Monocyte % 8.7 5.0 - 12.0 %    Eosinophil % 2.3 0.3 - 6.2 %    Basophil % 0.5 0.0 - 1.5 %    Immature Grans % 0.2 0.0 - 0.5 %    Neutrophils, Absolute 5.62 1.70 - 7.00 10*3/mm3    Lymphocytes, Absolute 1.62 0.70 - 3.10 10*3/mm3    Monocytes, Absolute 0.71 0.10 - 0.90 10*3/mm3    Eosinophils, Absolute 0.19 0.00 - 0.40 10*3/mm3     Basophils, Absolute 0.04 0.00 - 0.20 10*3/mm3    Immature Grans, Absolute 0.02 0.00 - 0.05 10*3/mm3    nRBC 0.0 0.0 - 0.2 /100 WBC         RADIOLOGY  XR Foot 3+ View Right    Result Date: 2/23/2024  RIGHT FOOT: AP, LATERAL, OBLIQUE  HISTORY: Right great toe cellulitis. Ulcer.  COMPARISON: None.  FINDINGS: Midfoot alignment appears within normal limits.  There is no evidence for fracture or acute osseous abnormality. There is mild soft tissue swelling. Chronic calcification extends along the course of the proximal plantar fascia.  Heel spurs are present. There are mild arthritic changes at the 1st MTP joint.      Soft tissue swelling without radiographic evidence for osteomyelitis or acute osseous abnormality.   This report was finalized on 2/23/2024 4:56 PM by Dr. Heraclio Bustos M.D on Workstation: [id number]         MEDICATIONS GIVEN IN ER  Medications   sodium chloride 0.9 % bolus 1,000 mL (has no administration in time range)   piperacillin-tazobactam (ZOSYN) 3.375 g in iso-osmotic dextrose 50 ml (premix) (3.375 g Intravenous New Bag 2/23/24 1829)   vancomycin IVPB 1500 mg in 0.9% NaCl (Premix) 500 mL (has no administration in time range)   sodium chloride 0.9 % flush 10 mL (has no administration in time range)   sodium chloride 0.9 % flush 10 mL (has no administration in time range)   sodium chloride 0.9 % infusion 40 mL (has no administration in time range)   dextrose (GLUTOSE) oral gel 15 g (has no administration in time range)   dextrose (D50W) (25 g/50 mL) IV injection 25 g (has no administration in time range)   glucagon (GLUCAGEN) injection 1 mg (has no administration in time range)   Insulin Lispro (humaLOG) injection 3-14 Units (has no administration in time range)   nitroglycerin (NITROSTAT) SL tablet 0.4 mg (has no administration in time range)   sennosides-docusate (PERICOLACE) 8.6-50 MG per tablet 2 tablet (has no administration in time range)     And   polyethylene glycol (MIRALAX) packet 17 g (has  no administration in time range)     And   bisacodyl (DULCOLAX) EC tablet 5 mg (has no administration in time range)     And   bisacodyl (DULCOLAX) suppository 10 mg (has no administration in time range)   Pharmacy to dose vancomycin (has no administration in time range)   piperacillin-tazobactam (ZOSYN) 3.375 g in iso-osmotic dextrose 50 ml (premix) (has no administration in time range)   insulin regular (humuLIN R,novoLIN R) injection 8 Units (8 Units Intravenous Given 2/23/24 1808)         ORDERS PLACED DURING THIS VISIT:  Orders Placed This Encounter   Procedures    Blood Culture - Blood,    Blood Culture - Blood,    Wound Culture - Swab, Toe, Right    XR Foot 3+ View Right    Comprehensive Metabolic Panel    Lactic Acid, Plasma    Sedimentation Rate    C-reactive Protein    CBC Auto Differential    STAT Lactic Acid, Reflex    CBC Auto Differential    Basic Metabolic Panel    Diet: Cardiac Diets, Diabetic Diets; Healthy Heart (2-3 Na+); Consistent Carbohydrate; Texture: Regular Texture (IDDSI 7); Fluid Consistency: Thin (IDDSI 0)    Vital Signs    Intake & Output    Weigh Patient    Oral Care    Place Sequential Compression Device    Maintain Sequential Compression Device    Telemetry - Maintain IV Access    Telemetry - Place Orders & Notify Provider of Results When Patient Experiences Acute Chest Pain, Dysrhythmia or Respiratory Distress    May Be Off Telemetry for Tests    Pulse Oximetry, Continuous    Up With Assistance    Daily Weights    Neurovascular Checks    LHA (on-call MD unless specified) Details    Inpatient Diabetes Educator Consult    POC Glucose 4x Daily Before Meals & at Bedtime    Insert Peripheral IV    Initiate Observation Status    CBC & Differential         OUTPATIENT MEDICATION MANAGEMENT:  Current Facility-Administered Medications Ordered in Epic   Medication Dose Route Frequency Provider Last Rate Last Admin    sennosides-docusate (PERICOLACE) 8.6-50 MG per tablet 2 tablet  2 tablet Oral  BID PRN Awilda Gay APRN        And    polyethylene glycol (MIRALAX) packet 17 g  17 g Oral Daily PRN Awilda Gay APRN        And    bisacodyl (DULCOLAX) EC tablet 5 mg  5 mg Oral Daily PRN Awilda Gay APRN        And    bisacodyl (DULCOLAX) suppository 10 mg  10 mg Rectal Daily PRN Awilda Gay APRN        dextrose (D50W) (25 g/50 mL) IV injection 25 g  25 g Intravenous Q15 Min PRN Awilda Gay APRN        dextrose (GLUTOSE) oral gel 15 g  15 g Oral Q15 Min PRN Awilda Gay APRN        glucagon (GLUCAGEN) injection 1 mg  1 mg Intramuscular Q15 Min PRN Awilda Gay APRN        Insulin Lispro (humaLOG) injection 3-14 Units  3-14 Units Subcutaneous 4x Daily With Meals & Nightly Awilda Gay APRN        nitroglycerin (NITROSTAT) SL tablet 0.4 mg  0.4 mg Sublingual Q5 Min PRN Awilda Gay APRN        Pharmacy to dose vancomycin   Does not apply Continuous PRN Awilda Gay APRN        piperacillin-tazobactam (ZOSYN) 3.375 g in iso-osmotic dextrose 50 ml (premix)  3.375 g Intravenous Once Asia Wong PA-C   3.375 g at 02/23/24 1829    [START ON 2/24/2024] piperacillin-tazobactam (ZOSYN) 3.375 g in iso-osmotic dextrose 50 ml (premix)  3.375 g Intravenous Q8H Awilda Gay APRN        sodium chloride 0.9 % bolus 1,000 mL  1,000 mL Intravenous Once Asia Wong PA-C        sodium chloride 0.9 % flush 10 mL  10 mL Intravenous Q12H Awilda Gay APRN        sodium chloride 0.9 % flush 10 mL  10 mL Intravenous PRN Awilda Gay APRN        sodium chloride 0.9 % infusion 40 mL  40 mL Intravenous PRN Awilda Gay APRN        vancomycin IVPB 1500 mg in 0.9% NaCl (Premix) 500 mL  20 mg/kg Intravenous Once Asia Wong PA-C         Current Outpatient Medications Ordered in Epic   Medication Sig Dispense Refill    amitriptyline (ELAVIL) 25 MG tablet Take 3 tablets by  mouth Every Night. 90 tablet 0    azithromycin (ZITHROMAX) 250 MG tablet Take 2 tablets the first day, then 1 tablet daily for 4 days. 6 tablet 0    baclofen (LIORESAL) 10 MG tablet Take 1 tablet by mouth 2 (Two) Times a Day. 60 tablet 0    cetirizine (ZyrTEC) 10 MG tablet Take 10 mg by mouth Daily As Needed for allergies.      DULoxetine (CYMBALTA) 30 MG capsule Take 60 mg by mouth daily         glipiZIDE (GLUCOTROL) 10 MG tablet Take 10 mg by mouth 2 (Two) Times a Day Before Meals.      ibuprofen (ADVIL,MOTRIN) 600 MG tablet Take 1 tablet by mouth Every 8 (Eight) Hours As Needed for Moderate Pain . 21 tablet 0    Insulin Glargine-Lixisenatide (Soliqua) 100-33 UNT-MCG/ML solution pen-injector injection Inject 50 Units under the skin into the appropriate area as directed Daily.      lisinopril (PRINIVIL,ZESTRIL) 10 MG tablet Take 20 mg by mouth Daily.      meloxicam (MOBIC) 15 MG tablet Take 15 mg by mouth Daily.      METFORMIN HCL PO Take 1,000 mg by mouth 2 (two) times a day         methylPREDNISolone (MEDROL) 4 MG dose pack Take as directed on package instructions. 21 each 0    omeprazole (PriLOSEC) 40 MG capsule Take 40 mg by mouth daily.      rosuvastatin (CRESTOR) 10 MG tablet Take 10 mg by mouth daily.      SUMAtriptan (Imitrex) 50 MG tablet Take one tablet at onset of headache. May repeat dose one time in 2 hours if headache not relieved. 9 tablet 0         PROCEDURES  Procedures            PROGRESS, DATA ANALYSIS, CONSULTS, AND MEDICAL DECISION MAKING  All labs have been independently interpreted by me.  All radiology studies have been reviewed by me. All EKG's have been independently viewed and interpreted by me.  Discussion below represents my analysis of pertinent findings related to patient's condition, differential diagnosis, treatment plan and final disposition.    DIFFERENTIAL    Differential diagnosis includes but is not limited to cellulitis, osteomyelitis, ulcer, sepsis, hyperglycemia      ED  Course as of 02/23/24 1834 Fri Feb 23, 2024   1735 Lactate(!!): 3.4 [KA]   1735 C-Reactive Protein(!): 0.51 [KA]   1735 WBC: 8.20 [KA]   1735 Hemoglobin: 13.3 [KA]   1748 Glucose(!!): 447 [KA]   1748 Creatinine: 0.92 [KA]   1804 IV fluids for the patient's hyperglycemia and lactic acidosis as well as a dose of IV insulin.  She has a normal white blood cell count and is afebrile but elevated lactate and CRP with obvious infection which has failed outpatient antibiotics.  I think in light of her persistent infection and severe hyperglycemia recommend admission for IV antibiotics, glycemic control.  She is agreeable. [KA]   1833 I discussed patient with Dr. Krishnan, hospitalist including history presentation workup and he agrees to admit for further evaluation and treatment. [KA]      ED Course User Index  [KA] Asia Wong PA-C             AS OF 18:34 EST VITALS:    BP - 144/82  HR - 93  TEMP - 97.4 °F (36.3 °C)  O2 SATS - 96%    COMPLEXITY OF CARE  The patient requires admission.      DIAGNOSIS  Final diagnoses:   Diabetic foot infection   Type 2 diabetes mellitus with hyperglycemia, with long-term current use of insulin   Lactic acidosis         DISPOSITION  ED Disposition       ED Disposition   Decision to Admit    Condition   --    Comment   Level of Care: Telemetry [5]   Diagnosis: Hyperglycemia [048904]   Admitting Physician: DESTINY KRISHNAN [[id number]]   Attending Physician: DESTINY KRISHNAN [[id number]]                    FOLLOW UP  No follow-up provider specified.            Please note that portions of this document were completed with a voice recognition program.    Note Disclaimer: At [facility], we believe that sharing information builds trust and better relationships. You are receiving this note because you recently visited [facility]. It is possible you will see health information before a provider has talked with you about it. This kind of information can be easy to misunderstand. To help you  fully understand what it means for your health, we urge you to discuss this note with your provider.         Asia Wong PA-C  02/23/24 1835      Electronically signed by López Vo MD at 02/23/24 1935       Bekah Andrade, RN at 02/23/24 1503          Patient to ER via car from home for wound on R big toe x 2 weeks    Patient is being treated with abx without improvement    Patient is known diabetic    Electronically signed by Bekah Andrade, RN at 02/23/24 1504       Vital Signs (last 4 days)       Date/Time Temp Temp src Pulse Resp BP Patient Position SpO2    02/26/24 0707 97.5 (36.4) Oral 74 16 137/81 Lying 97    02/25/24 2339 98.4 (36.9) Oral -- 18 128/76 Lying 98    02/25/24 2108 98.6 (37) Oral 90 18 131/86 Lying 99    02/25/24 1509 98.6 (37) Oral 77 18 146/74 Lying 100    02/25/24 1107 97.9 (36.6) Oral 75 16 124/85 Lying 98    02/25/24 0708 97.7 (36.5) Oral 71 16 114/71 Lying 98    02/25/24 0257 98.1 (36.7) -- -- -- 146/87 -- 98    02/24/24 2326 98.4 (36.9) -- 76 18 154/90 -- 98    02/24/24 2038 98 (36.7) Oral 80 -- 136/86 -- 99    02/24/24 1311 98.2 (36.8) Oral 82 18 124/79 Lying 100    02/24/24 0731 97.8 (36.6) Oral 88 18 143/94 Sitting --    02/24/24 0716 98.1 (36.7) Oral 78 18 131/84 Lying 96    02/24/24 0456 98.1 (36.7) -- 78 -- 131/84 -- 96    02/23/24 2358 98.4 (36.9) -- 85 -- 154/86 -- 98    02/23/24 2006 98.5 (36.9) -- 87 18 170/94 -- 96    02/23/24 1849 -- -- 92 -- -- -- 96    02/23/24 1742 -- -- 93 -- -- -- 96    02/23/24 1609 -- -- 100 -- -- -- 95    02/23/24 1542 -- -- 100 -- -- -- 96    02/23/24 1515 -- -- 98 -- -- -- 96    02/23/24 1513 -- -- 100 -- 144/82 -- 97    02/23/24 1504 97.4 (36.3) -- 116 18 -- -- 98          Oxygen Therapy (last 4 days)       Date/Time SpO2 Device (Oxygen Therapy) Flow (L/min) Oxygen Concentration (%) ETCO2 (mmHg)    02/26/24 0850 -- room air -- -- --    02/26/24 0707 97 room air -- -- --    02/25/24 2339 98 -- -- -- --    02/25/24 2108 99 -- -- -- --     02/25/24 1509 100 room air -- -- --    02/25/24 1107 98 room air -- -- --    02/25/24 0708 98 room air -- -- --    02/25/24 0257 98 -- -- -- --    02/24/24 2326 98 -- -- -- --    02/24/24 2038 99 -- -- -- --    02/24/24 1311 100 room air -- -- --    02/24/24 0800 -- room air -- -- --    02/24/24 0716 96 room air -- -- --    02/24/24 0456 96 -- -- -- --    02/23/24 2358 98 -- -- -- --    02/23/24 2006 96 -- -- -- --    02/23/24 1849 96 -- -- -- --    02/23/24 1742 96 -- -- -- --    02/23/24 1609 95 -- -- -- --    02/23/24 1542 96 -- -- -- --    02/23/24 1515 96 -- -- -- --    02/23/24 1513 97 -- -- -- --    02/23/24 1504 98 -- -- -- --              Orders (all)        Start     Ordered    02/26/24 1400  Vancomycin, Trough  Timed         02/23/24 1945    02/26/24 0814  Discontinue IV  Once         02/26/24 0814    02/26/24 0812  Discharge patient  Once         02/26/24 0814    02/26/24 0613  POC Glucose Once  PROCEDURE ONCE        Comments: Complete no more than 45 minutes prior to patient eating      02/26/24 0609    02/26/24 0600  CBC (No Diff)  Morning Draw         02/25/24 1117    02/26/24 0600  Basic Metabolic Panel  Morning Draw         02/25/24 1117    02/26/24 0600  Hemoglobin A1c  Morning Draw         02/25/24 1544    02/26/24 0000  cephalexin (Keflex) 500 MG capsule  4 Times Daily         02/26/24 0814    02/26/24 0000  Activity as Tolerated         02/26/24 0814    02/26/24 0000  Other Activity Instructions        Comments: Activity Instructions: L foot WBAT in post-op shoe    02/26/24 0814    02/26/24 0000  Diet: Cardiac Diets, Diabetic Diets; Healthy Heart (2-3 Na+); Regular Texture (IDDSI 7); Thin (IDDSI 0); Consistent Carbohydrate         02/26/24 0814    02/26/24 0000  Walker  Walker Folding with Wheels         02/26/24 0931    02/26/24 0000  Insulin Lispro, 1 Unit Dial, (HumaLOG KwikPen) 100 UNIT/ML solution pen-injector  3 Times Daily With Meals         02/26/24 0954    02/26/24 0000  Insulin  Pen Needle (Pen Needles) 32G X 4 MM misc  4 Times Daily PRN         02/26/24 0954    02/25/24 2127  POC Glucose Once  PROCEDURE ONCE        Comments: Complete no more than 45 minutes prior to patient eating      02/25/24 2107    02/25/24 1609  PT Consult: Eval & Treat As Tolerated; Functional Mobility Below Baseline  Once         02/25/24 1608    02/25/24 1551  POC Glucose Once  PROCEDURE ONCE        Comments: Complete no more than 45 minutes prior to patient eating      02/25/24 1549    02/25/24 1545  Change Dressing  Daily      Comments: Betadine WTD right hallux    02/25/24 1544    02/25/24 1544  Apply Shoe  Until Discontinued        Comments: Please obtain and apply post op shoe to RLE. WBAT in shoe    02/25/24 1544    02/25/24 1536  Inpatient Admission  Once         02/25/24 1536    02/25/24 1200  insulin lispro (HUMALOG/ADMELOG) injection 10 Units  3 Times Daily With Meals,   Status:  Discontinued         02/25/24 1006    02/25/24 1200  insulin lispro (HUMALOG/ADMELOG) injection 8 Units  3 Times Daily With Meals         02/25/24 1114    02/25/24 1100  insulin glargine (LANTUS, SEMGLEE) injection 15 Units  Daily,   Status:  Discontinued         02/25/24 1006    02/25/24 1055  POC Glucose Once  PROCEDURE ONCE        Comments: Complete no more than 45 minutes prior to patient eating      02/25/24 1053    02/25/24 0654  POC Glucose Once  PROCEDURE ONCE        Comments: Complete no more than 45 minutes prior to patient eating      02/25/24 0652    02/25/24 0600  Creatinine Serum (kidney function) GFR component  Daily      Comments: For vancomycin dosing      02/23/24 1945    02/25/24 0600  CBC (No Diff)  Morning Draw         02/24/24 1158    02/25/24 0600  Basic Metabolic Panel  Morning Draw         02/24/24 1158    02/24/24 2120  POC Glucose Once  PROCEDURE ONCE        Comments: Complete no more than 45 minutes prior to patient eating      02/24/24 2117    02/24/24 1635  POC Glucose Once  PROCEDURE ONCE         Comments: Complete no more than 45 minutes prior to patient eating      02/24/24 1633    02/24/24 1500  vancomycin IVPB 1500 mg in 0.9% NaCl (Premix) 500 mL  Every 24 Hours         02/23/24 1943    02/24/24 1245  insulin lispro (HUMALOG/ADMELOG) injection 8 Units  3 Times Daily With Meals,   Status:  Discontinued         02/24/24 1158    02/24/24 1057  POC Glucose Once  PROCEDURE ONCE        Comments: Complete no more than 45 minutes prior to patient eating      02/24/24 1055    02/24/24 0900  DULoxetine (CYMBALTA) DR capsule 60 mg  Daily         02/23/24 2246 02/24/24 0900  lisinopril (PRINIVIL,ZESTRIL) tablet 20 mg  Daily         02/23/24 2246 02/24/24 0900  meloxicam (MOBIC) tablet 15 mg  Daily,   Status:  Discontinued         02/23/24 2246 02/24/24 0900  rosuvastatin (CRESTOR) tablet 10 mg  Daily         02/23/24 2246 02/24/24 0900  hydroCHLOROthiazide tablet 25 mg  Daily,   Status:  Discontinued         02/23/24 2247 02/24/24 0800  Oral Care  2 Times Daily       02/23/24 1818    02/24/24 0800  PARoxetine (PAXIL) tablet 20 mg  Every Morning         02/23/24 2246 02/24/24 0623  POC Glucose Once  PROCEDURE ONCE        Comments: Complete no more than 45 minutes prior to patient eating      02/24/24 0620    02/24/24 0600  CBC Auto Differential  Morning Draw         02/23/24 1818    02/24/24 0600  Basic Metabolic Panel  Morning Draw         02/23/24 1818    02/24/24 0600  pantoprazole (PROTONIX) EC tablet 40 mg  Every Early Morning         02/23/24 2246    02/24/24 0500  lactated ringers infusion  Continuous,   Status:  Discontinued         02/24/24 0401    02/24/24 0257  STAT Lactic Acid, Reflex  PROCEDURE ONCE         02/24/24 0104    02/24/24 0100  piperacillin-tazobactam (ZOSYN) 3.375 g in iso-osmotic dextrose 50 ml (premix)  Every 8 Hours        Note to Pharmacy: 1st dose given in ER    02/23/24 1821    02/24/24 0030  amitriptyline (ELAVIL) tablet 75 mg  Nightly         02/23/24 2015     02/24/24 0030  baclofen (LIORESAL) tablet 10 mg  2 Times Daily         02/23/24 2246 02/23/24 2356  STAT Lactic Acid, Reflex  PROCEDURE ONCE         02/23/24 2149 02/23/24 2345  insulin glargine (LANTUS, SEMGLEE) injection 50 Units  Nightly         02/23/24 2246 02/23/24 2245  SUMAtriptan (IMITREX) tablet 50 mg  Every 2 Hours PRN         02/23/24 2246 02/23/24 2244  ibuprofen (ADVIL,MOTRIN) tablet 400 mg  Every 4 Hours PRN,   Status:  Discontinued         02/23/24 2246 02/23/24 2244  gabapentin (NEURONTIN) capsule 300 mg  Nightly PRN         02/23/24 2246 02/23/24 2244  cetirizine (zyrTEC) tablet 10 mg  Daily PRN         02/23/24 2246 02/23/24 2242  POC Glucose Once  PROCEDURE ONCE        Comments: Complete no more than 45 minutes prior to patient eating      02/23/24 2240 02/23/24 2207  Wound Ostomy Eval & Treat  Once         02/23/24 2207 02/23/24 2202  Inpatient Podiatry Consult  Once        Specialty:  Podiatry  Provider:  Srini Khalil MD    02/23/24 2202 02/23/24 2200  POC Glucose 4x Daily Before Meals & at Bedtime  4 Times Daily Before Meals & at Bedtime      Comments: Complete no more than 45 minutes prior to patient eating      02/23/24 1818 02/23/24 2132  Inpatient Case Management  Consult  Once        Provider:  (Not yet assigned)    02/23/24 2202 02/23/24 2100  sodium chloride 0.9 % flush 10 mL  Every 12 Hours Scheduled         02/23/24 1818 02/23/24 2044  Code Status and Medical Interventions:  Continuous         02/23/24 2043    02/23/24 2001  STAT Lactic Acid, Reflex  PROCEDURE ONCE         02/23/24 1728    02/23/24 2000  Vital Signs  Every 4 Hours       02/23/24 1818 02/23/24 1834  Insulin Lispro (humaLOG) injection 3-14 Units  4 Times Daily With Meals & Nightly         02/23/24 1818 02/23/24 1820  Initiate Observation Status  Once         02/23/24 1819 02/23/24 1819  Pharmacy to dose vancomycin  Continuous PRN         02/23/24 1829     02/23/24 1819  Daily Weights  Daily       02/23/24 1818 02/23/24 1819  Neurovascular Checks  Every Shift       02/23/24 1818    02/23/24 1819  Diet: Cardiac Diets, Diabetic Diets; Healthy Heart (2-3 Na+); Consistent Carbohydrate; Texture: Regular Texture (IDDSI 7); Fluid Consistency: Thin (IDDSI 0)  Diet Effective Now         02/23/24 1818    02/23/24 1819  Inpatient Diabetes Educator Consult  Once        Provider:  (Not yet assigned)    02/23/24 1818    02/23/24 1818  sennosides-docusate (PERICOLACE) 8.6-50 MG per tablet 2 tablet  2 Times Daily PRN        See Hyperspace for full Linked Orders Report.    02/23/24 1818    02/23/24 1818  polyethylene glycol (MIRALAX) packet 17 g  Daily PRN        See Hyperspace for full Linked Orders Report.    02/23/24 1818    02/23/24 1818  bisacodyl (DULCOLAX) EC tablet 5 mg  Daily PRN        See Hyperspace for full Linked Orders Report.    02/23/24 1818    02/23/24 1818  bisacodyl (DULCOLAX) suppository 10 mg  Daily PRN        See Hyperspace for full Linked Orders Report.    02/23/24 1818    02/23/24 1818  Intake & Output  Every Shift       02/23/24 1818    02/23/24 1818  Weigh Patient  Once         02/23/24 1818    02/23/24 1818  Insert Peripheral IV  Once         02/23/24 1818    02/23/24 1818  Saline Lock & Maintain IV Access  Continuous,   Status:  Canceled         02/23/24 1818    02/23/24 1818  Place Sequential Compression Device  Once         02/23/24 1818    02/23/24 1818  Maintain Sequential Compression Device  Continuous         02/23/24 1818    02/23/24 1818  Continuous Cardiac Monitoring  Continuous        Comments: Follow Standing Orders As Outlined in Process Instructions (Open Order Report to View Full Instructions)    02/23/24 1818 02/23/24 1818  Telemetry - Maintain IV Access  Continuous         02/23/24 1818    02/23/24 1818  Telemetry - Place Orders & Notify Provider of Results When Patient Experiences Acute Chest Pain, Dysrhythmia or Respiratory  Distress  Until Discontinued         02/23/24 1818    02/23/24 1818  May Be Off Telemetry for Tests  Continuous         02/23/24 1818    02/23/24 1818  Pulse Oximetry, Continuous  Continuous         02/23/24 1818    02/23/24 1817  nitroglycerin (NITROSTAT) SL tablet 0.4 mg  Every 5 Minutes PRN         02/23/24 1818    02/23/24 1817  dextrose (GLUTOSE) oral gel 15 g  Every 15 Minutes PRN         02/23/24 1818    02/23/24 1817  dextrose (D50W) (25 g/50 mL) IV injection 25 g  Every 15 Minutes PRN         02/23/24 1818    02/23/24 1817  glucagon (GLUCAGEN) injection 1 mg  Every 15 Minutes PRN         02/23/24 1818    02/23/24 1817  sodium chloride 0.9 % flush 10 mL  As Needed         02/23/24 1818    02/23/24 1817  sodium chloride 0.9 % infusion 40 mL  As Needed         02/23/24 1818    02/23/24 1804  vancomycin IVPB 1500 mg in 0.9% NaCl (Premix) 500 mL  Once         02/23/24 1748    02/23/24 1804  insulin regular (humuLIN R,novoLIN R) injection 8 Units  Once         02/23/24 1748    02/23/24 1753  [facility] (on-call MD unless specified) Details  Once        Specialty:  Hospitalist  Provider:  Alejandro Krishnan MD    02/23/24 1752    02/23/24 1752  piperacillin-tazobactam (ZOSYN) 3.375 g in iso-osmotic dextrose 50 ml (premix)  Once         02/23/24 1736    02/23/24 1751  sodium chloride 0.9 % bolus 1,000 mL  Once         02/23/24 1735    02/23/24 1737  Blood Culture - Blood, Arm, Left  Once         02/23/24 1736    02/23/24 1737  Blood Culture - Blood, Arm, Left  Once         02/23/24 1736    02/23/24 1737  Wound Culture - Swab, Toe, Right  Once         02/23/24 1736    02/23/24 1559  CBC & Differential  Once         02/23/24 1558    02/23/24 1559  Comprehensive Metabolic Panel  Once         02/23/24 1558    02/23/24 1559  Lactic Acid, Plasma  Once         02/23/24 1558    02/23/24 1559  Sedimentation Rate  STAT         02/23/24 1558    02/23/24 1559  C-reactive Protein  STAT         02/23/24 1558    02/23/24 1559  XR Foot  3+ View Right  1 Time Imaging         24 1558    24 1559  CBC Auto Differential  PROCEDURE ONCE         24 1559    24 0000  gabapentin (NEURONTIN) 300 MG capsule  Nightly PRN         24 0000  hydroCHLOROthiazide 25 MG tablet  Daily         24    Unscheduled  Follow Hypoglycemia Standing Orders For Blood Glucose <70 & Notify Provider of Treatment  As Needed      Comments: Follow Hypoglycemia Orders As Outlined in Process Instructions (Open Order Report to View Full Instructions)  Notify Provider Any Time Hypoglycemia Treatment is Administered    24    Unscheduled  Up With Assistance  As Needed       24    --  PARoxetine (PAXIL) 20 MG tablet  Every Morning,   Status:  Discontinued         24    --  SCANNED - TELEMETRY           24 0000    --  SCANNED - TELEMETRY           24 0000    --  SCANNED - TELEMETRY           24 0000    --  SCANNED - TELEMETRY           24 0000    --  SCANNED - TELEMETRY           24 0000    --  SCANNED - TELEMETRY           24 0000    --  SCANNED - TELEMETRY           24 0000    --  SCANNED - TELEMETRY           24 0000    --  SCANNED - TELEMETRY           24 0000                     Physician Progress Notes (last 4 days)        Jaime Rivers MD at 24 1110           LOS: 0 days     Name: Roxanna Beck  Age: 58 y.o.  Sex: female  :  1965  MRN: [id number]         Primary Care Physician: Donato Vilchis, DO    Subjective   Subjective  No new complaints.  Denies fevers or chills.  No change to the appearance of her right toe    Objective   Vital Signs  Temp:  [97.7 °F (36.5 °C)-98.4 °F (36.9 °C)] 97.7 °F (36.5 °C)  Heart Rate:  [71-82] 71  Resp:  [16-18] 16  BP: (114-154)/(71-90) 114/71  Body mass index is 29.74 kg/m².    Objective:  General Appearance:  Comfortable and in no acute distress.    Vital signs: (most recent):  "Blood pressure 114/71, pulse 71, temperature 97.7 °F (36.5 °C), temperature source Oral, resp. rate 16, height 162.6 cm (64\"), weight 78.6 kg (173 lb 4.5 oz), SpO2 98%.    Lungs:  Normal effort and normal respiratory rate.    Heart: Normal rate.  Regular rhythm.    Abdomen: Abdomen is soft.  Bowel sounds are normal.   There is no abdominal tenderness.     Extremities: There is no dependent edema or local swelling.  (Erythema, swelling, ulceration to the right great toe)  Neurological: Patient is alert and oriented to person, place and time.    Skin:  Warm and dry.                Results Review:       I reviewed the patient's new clinical results.    Results from last 7 days   Lab Units 02/25/24  0505 02/24/24  0313 02/23/24  1701   WBC 10*3/mm3 5.42 6.86 8.20   HEMOGLOBIN g/dL 12.9 12.8 13.3   PLATELETS 10*3/mm3 184 195 205     Results from last 7 days   Lab Units 02/25/24  0505 02/24/24  0313 02/23/24  1701   SODIUM mmol/L 140 137 133*   POTASSIUM mmol/L 4.3 3.9 5.1   CHLORIDE mmol/L 106 104 98   CO2 mmol/L 26.0 22.0 21.5*   BUN mg/dL 21* 29* 33*   CREATININE mg/dL 0.91 0.91 0.92   CALCIUM mg/dL 9.3 9.0 10.0   GLUCOSE mg/dL 274* 294* 447*                 Scheduled Meds:   amitriptyline, 75 mg, Oral, Nightly  baclofen, 10 mg, Oral, BID  DULoxetine, 60 mg, Oral, Daily  insulin glargine, 50 Units, Subcutaneous, Nightly  insulin lispro, 3-14 Units, Subcutaneous, 4x Daily With Meals & Nightly  insulin lispro, 8 Units, Subcutaneous, TID With Meals  lisinopril, 20 mg, Oral, Daily  pantoprazole, 40 mg, Oral, Q AM  PARoxetine, 20 mg, Oral, QAM  piperacillin-tazobactam, 3.375 g, Intravenous, Q8H  rosuvastatin, 10 mg, Oral, Daily  sodium chloride, 10 mL, Intravenous, Q12H  vancomycin, 1,500 mg, Intravenous, Q24H      PRN Meds:     senna-docusate sodium **AND** polyethylene glycol **AND** bisacodyl **AND** bisacodyl    cetirizine    dextrose    dextrose    gabapentin    glucagon (human recombinant)    nitroglycerin    Pharmacy " "to dose vancomycin    sodium chloride    sodium chloride    SUMAtriptan  Continuous Infusions:  lactated ringers, 75 mL/hr, Last Rate: 75 mL/hr (24 1324)  Pharmacy to dose vancomycin,         Assessment & Plan   Active Hospital Problems    Diagnosis  POA    **Diabetic ulcer of right great toe [E11.621, L97.519]  Yes    Hyperglycemia [R73.9]  Yes    Lactic acidosis [E87.20]  Yes    Insulin dependent type 2 diabetes mellitus [E11.9, Z79.4]  Not Applicable    Hypertension [I10]  Yes    Generalized anxiety disorder [F41.1]  Yes    Diabetic peripheral neuropathy [E11.42]  Yes      Resolved Hospital Problems   No resolved problems to display.       Assessment & Plan    -Continue broad-spectrum antibiotics  -Podiatry has been consulted  -Blood sugars dropped to 65 this morning.  Had been running consistently in the low 200s.  Will hold on any further increase of Lantus.  -Stop IV fluids for now      Expected discharge date/ time has not been documented.     Jaime Rivers MD  [facility]  24  11:10 EST      Electronically signed by Jaime Rivers MD at 24 1115       Jaime Rivers MD at 24 1154           LOS: 0 days     Name: Roxanna Beck  Age: 58 y.o.  Sex: female  :  1965  MRN: [id number]         Primary Care Physician: Donato Vilchis DO    Subjective   Subjective  Denies any fevers or chills.  No acute overnight events.  Tolerating antibiotics without rash or diarrhea    Objective   Vital Signs  Temp:  [97.4 °F (36.3 °C)-98.5 °F (36.9 °C)] 97.8 °F (36.6 °C)  Heart Rate:  [] 88  Resp:  [18] 18  BP: (131-170)/(82-94) 143/94  Body mass index is 29.9 kg/m².    Objective:  General Appearance:  Comfortable and in no acute distress.    Vital signs: (most recent): Blood pressure 143/94, pulse 88, temperature 97.8 °F (36.6 °C), temperature source Oral, resp. rate 18, height 162.6 cm (64\"), weight 79 kg (174 lb 2.6 oz), SpO2 96%. "    Lungs:  Normal effort and normal respiratory rate.    Heart: Normal rate.  Regular rhythm.    Abdomen: Abdomen is soft.  Bowel sounds are normal.   There is no abdominal tenderness.     Extremities: There is no dependent edema or local swelling.    Neurological: Patient is alert and oriented to person, place and time.    Skin:  Warm and dry.  (Right great toe swelling and erythema with ulceration but no drainage.)              Results Review:       I reviewed the patient's new clinical results.    Results from last 7 days   Lab Units 02/24/24  0313 02/23/24  1701   WBC 10*3/mm3 6.86 8.20   HEMOGLOBIN g/dL 12.8 13.3   PLATELETS 10*3/mm3 195 205     Results from last 7 days   Lab Units 02/24/24  0313 02/23/24  1701   SODIUM mmol/L 137 133*   POTASSIUM mmol/L 3.9 5.1   CHLORIDE mmol/L 104 98   CO2 mmol/L 22.0 21.5*   BUN mg/dL 29* 33*   CREATININE mg/dL 0.91 0.92   CALCIUM mg/dL 9.0 10.0   GLUCOSE mg/dL 294* 447*                 Scheduled Meds:   amitriptyline, 75 mg, Oral, Nightly  baclofen, 10 mg, Oral, BID  DULoxetine, 60 mg, Oral, Daily  insulin glargine, 50 Units, Subcutaneous, Nightly  insulin lispro, 3-14 Units, Subcutaneous, 4x Daily With Meals & Nightly  lisinopril, 20 mg, Oral, Daily  pantoprazole, 40 mg, Oral, Q AM  PARoxetine, 20 mg, Oral, QAM  piperacillin-tazobactam, 3.375 g, Intravenous, Q8H  rosuvastatin, 10 mg, Oral, Daily  sodium chloride, 10 mL, Intravenous, Q12H  vancomycin, 1,500 mg, Intravenous, Q24H      PRN Meds:     senna-docusate sodium **AND** polyethylene glycol **AND** bisacodyl **AND** bisacodyl    cetirizine    dextrose    dextrose    gabapentin    glucagon (human recombinant)    nitroglycerin    Pharmacy to dose vancomycin    sodium chloride    sodium chloride    SUMAtriptan  Continuous Infusions:  lactated ringers, 100 mL/hr, Last Rate: 100 mL/hr (02/24/24 1143)  Pharmacy to dose vancomycin,         Assessment & Plan   Active Hospital Problems    Diagnosis  POA    **Diabetic ulcer of  right great toe [E11.621, L97.519]  Yes    Hyperglycemia [R73.9]  Yes    Lactic acidosis [E87.20]  Yes    Insulin dependent type 2 diabetes mellitus [E11.9, Z79.4]  Not Applicable    Hypertension [I10]  Yes    Generalized anxiety disorder [F41.1]  Yes    Diabetic peripheral neuropathy [E11.42]  Yes      Resolved Hospital Problems   No resolved problems to display.       Assessment & Plan    -Continue broad-spectrum antibiotics  -Podiatry has been consulted  -Blood sugars uncontrolled.  I will intensify her insulin regimen  -Decrease rate of IV fluids    Dispo      Expected discharge date/ time has not been documented.     Jaime Rivers MD  [facility]  02/24/24  11:55 EST      Electronically signed by Jaime Rivers MD at 02/24/24 1157          Consult Notes (all)        López Zapata DPM at 02/25/24 1537        Consult Orders    1. Inpatient Podiatry Consult [[id number]] ordered by Awilda Gay APRN at 02/23/24 2202                 Podiatry Consult Note      Patient: Roxanna Beck Admit Date: 02/23/2024    Age: 58 y.o.   PCP: Donato Vilchis DO    MRN: [id number]  Room: 02/1        Subjective     Chief Complaint     Chief Complaint   Patient presents with    Wound Check        HPI     This is a 58-year-old female who presents with cellulitis and diabetic foot ulcer to the right big toe.  Patient states this has been present for about 3 weeks.  She saw her primary care doctor who prescribed her oral antibiotics and a topical cream.  She has been applying daily.  She states the redness started to move up her foot and she came to the emergency department.  She denies any purulent drainage from the site, however states there was bloody drainage.  She has been receiving antibiotics since being admitted.  She has had x-rays done which show no signs of osteomyelitis.  She denies any nausea vomiting fevers or chills.    Past Medical History     Past Medical  "History:   Diagnosis Date    Arthritis     Depression     Diabetes mellitus     checks sugar once per day     Dizzy     Fibromyalgia     GERD (gastroesophageal reflux disease)     History of kidney stones     HNP (herniated nucleus pulposus)     Hyperlipidemia     Hypertension     Wears glasses     readers        Past Surgical History:   Procedure Laterality Date    BACK SURGERY      L4-5 Discectomy    CARPAL TUNNEL RELEASE      left     SECTION      CHOLECYSTECTOMY      COLONOSCOPY      ENDOSCOPY      HYSTERECTOMY      KIDNEY STONE SURGERY      LUMBAR DISCECTOMY N/A 12/15/2016    Procedure: LUMBAR DISCECTOMY L3-4;  Surgeon: Sandro Cuenca MD;  Location: [facility];  Service:     OOPHORECTOMY      TONSILLECTOMY      WISDOM TOOTH EXTRACTION          Allergies   Allergen Reactions    Codeine Nausea And Vomiting    Percocet [Oxycodone-Acetaminophen] Nausea And Vomiting     Caused bad pain    Adhesive Tape Rash    Toradol [Ketorolac Tromethamine] Other (See Comments)     \"doesnt do anything\"   - DOESN'T WORK PER PATIENT        Social History     Tobacco Use   Smoking Status Never   Smokeless Tobacco Never        Objective   Physical Exam    Vitals:    24 1107   BP: 124/85   Pulse: 75   Resp: 16   Temp: 97.9 °F (36.6 °C)   SpO2: 98%          Dermatology: 2.0 x 0.8 x 0.4 ulceration to the right plantar medial hallux.  There is no probe to bone.  There is no purulent drainage.  Base is 80% granular 20% fibrotic.  Moderate erythema to the level of the first MPJ.    Vascular: DP and PT pulses are palpable    Neurological: Light touch and protective sensation are diminished to the forefoot midfoot    Musckuloskeletal: No pain with hallux IPJ or first MTPJ range of motion.  No crepitus noted.  There is limited range of motion to the right first metatarsophalangeal joint dorsiflexion.    Labs     Lab Results   Component Value Date    HGBA1C 10.90 (H) 2016    POCGLU 289 (H) 2024    SEDRATE 20 " 02/23/2024        CBC:      Lab 02/25/24  0505 02/24/24  0313 02/23/24  1701   WBC 5.42 6.86 8.20   HEMOGLOBIN 12.9 12.8 13.3   HEMATOCRIT 37.7 38.0 39.2   PLATELETS 184 195 205   NEUTROS ABS  --  4.02 5.62   IMMATURE GRANS (ABS)  --  0.02 0.02   LYMPHS ABS  --  1.98 1.62   MONOS ABS  --  0.62 0.71   EOS ABS  --  0.19 0.19   MCV 90.2 90.7 91.0          Results for orders placed or performed during the hospital encounter of 02/23/24   Blood Culture - Blood, Arm, Left    Specimen: Arm, Left; Blood   Result Value Ref Range    Blood Culture No growth at 24 hours         XR Foot 3+ View Right  Narrative: RIGHT FOOT: AP, LATERAL, OBLIQUE     HISTORY: Right great toe cellulitis. Ulcer.     COMPARISON: None.     FINDINGS: Midfoot alignment appears within normal limits.  There is no  evidence for fracture or acute osseous abnormality. There is mild soft  tissue swelling. Chronic calcification extends along the course of the  proximal plantar fascia.  Heel spurs are present. There are mild  arthritic changes at the 1st MTP joint.      Impression: Soft tissue swelling without radiographic evidence for  osteomyelitis or acute osseous abnormality.        This report was finalized on 2/23/2024 4:56 PM by Dr. Heraclio Bustos M.D on Workstation: [id number]          Assessment/Plan     58-year-old female with diabetic foot ulcer with fat exposed, right hallux, cellulitis right foot    -Patient examined and evaluated by myself  - Patient states colitis is improving with her antibiotics, agree to continue regimen  - I debrided the ulcer at bedside and removed all callus tissue.  The ulcer base is healthy and I have no concerns for osteomyelitis or deep infection.  - Betadine wet-to-dry dressing change daily for the patient.  - She needs to be weightbearing as tolerated in a postop shoe.  I will place an order for the shoe.  - Patient is okay from podiatry standpoint; she needs to follow-up with me outpatient    Thank you for the  consult, I will continue to follow    López Zapata DPM  Office: [phone]     Electronically signed by López Zapata DPM at 02/25/24 5465        All medication doses during the admission are shown, including meds that are no longer on order.  Scheduled Meds Sorted by Name  for Roxanna Beck as of 2/24/24 through 2/26/24    1 Day 3 Days 7 Days 10 Days < Today >   Legend:       Medications 02/24/24 02/25/24 02/26/24   amitriptyline (ELAVIL) tablet 75 mg  Dose: 75 mg  Freq: Nightly Route: PO  Start: 02/24/24 0030    0047 1928         1928          2100          baclofen (LIORESAL) tablet 10 mg  Dose: 10 mg  Freq: 2 Times Daily Route: PO  Start: 02/24/24 0030   Admin Instructions:       0047   0823 1928             0802 1928         0853   2100         DULoxetine (CYMBALTA) DR capsule 60 mg  Dose: 60 mg  Freq: Daily Route: PO  Start: 02/24/24 0900   Admin Instructions:       0823        0802        0853          hydroCHLOROthiazide tablet 25 mg  Dose: 25 mg  Freq: Daily Route: PO  Start: 02/24/24 0900 End: 02/24/24 0401   Admin Instructions:       0401-D/C'd          insulin glargine (LANTUS, SEMGLEE) injection 15 Units  Dose: 15 Units  Freq: Daily Route: SC  Start: 02/25/24 1100 End: 02/25/24 1114   Admin Instructions:        1114-D/C'd  1136           insulin glargine (LANTUS, SEMGLEE) injection 50 Units  Dose: 50 Units  Freq: Nightly Route: SC  Start: 02/23/24 2345   Admin Instructions:       0047   2138       2211        2100          Insulin Lispro (humaLOG) injection 3-14 Units  Dose: 3-14 Units  Freq: 4 Times Daily With Meals & Nightly Route: SC  Start: 02/23/24 1834   Admin Instructions:       0821   1111 [phone] 1949 0441   1722 [phone]   1800     2100          insulin lispro (HUMALOG/ADMELOG) injection 10 Units  Dose: 10 Units  Freq: 3 Times Daily With Meals Route: SC  Start: 02/25/24 1200 End: 02/25/24 1114   Admin Instructions:        1114-D/C'd          insulin lispro (HUMALOG/ADMELOG) injection 8 Units  Dose: 8 Units  Freq: 3 Times Daily With Meals Route: SC  Start: 02/25/24 1200   Admin Instructions:        1148   1720       0853   1200   1800        insulin lispro (HUMALOG/ADMELOG) injection 8 Units  Dose: 8 Units  Freq: 3 Times Daily With Meals Route: SC  Start: 02/24/24 1245 End: 02/25/24 1006   Admin Instructions:       [phone]   1006-D/C'd        insulin regular (humuLIN R,novoLIN R) injection 8 Units  Dose: 8 Units  Freq: Once Route: IV  Start: 02/23/24 1804 End: 02/23/24 1808   Admin Instructions:            lisinopril (PRINIVIL,ZESTRIL) tablet 20 mg  Dose: 20 mg  Freq: Daily Route: PO  Start: 02/24/24 0900   Admin Instructions:       0808 [id number] 1984          meloxicam (MOBIC) tablet 15 mg  Dose: 15 mg  Freq: Daily Route: PO  Start: 02/24/24 0900 End: 02/23/24 2246   Admin Instructions:            pantoprazole (PROTONIX) EC tablet 40 mg  Dose: 40 mg  Freq: Every Early Morning Route: PO  Start: 02/24/24 0600   Admin Instructions:       0506 1893        0618          PARoxetine (PAXIL) tablet 20 mg  Dose: 20 mg  Freq: Every Morning Route: PO  Start: 02/24/24 0800   Admin Instructions:       4711 (3551) 3266          piperacillin-tazobactam (ZOSYN) 3.375 g in iso-osmotic dextrose 50 ml (premix)  Dose: 3.375 g  Freq: Every 8 Hours Route: IV  Indications of Use: SKIN AND SOFT TISSUE INFECTION  Start: 02/24/24 0100 End: 02/29/24 0059   Admin Instructions:       0047   0821   1703      0002   0800   1728      0001   0852 [C]   1700        piperacillin-tazobactam (ZOSYN) 3.375 g in iso-osmotic dextrose 50 ml (premix)  Dose: 3.375 g  Freq: Once Route: IV  Indications of Use: SKIN AND SOFT TISSUE INFECTION  Last Dose: Stopped (02/23/24 1902)  Start: 02/23/24 1752 End: 02/23/24 1902   Admin Instructions:            rosuvastatin (CRESTOR) tablet 10 mg  Dose: 10 mg  Freq: Daily Route: PO  Start: 02/24/24 0900    Admin Instructions:       0823        1005        0853          sodium chloride 0.9 % bolus 1,000 mL  Dose: 1,000 mL  Freq: Once Route: IV  Last Dose: 1,000 mL (02/23/24 1906)  Start: 02/23/24 1751 End: 02/23/24 1936         sodium chloride 0.9 % flush 10 mL  Dose: 10 mL  Freq: Every 12 Hours Scheduled Route: IV  Start: 02/23/24 2100    0831   (4578) [C]       0800          0854   2100         vancomycin IVPB 1500 mg in 0.9% NaCl (Premix) 500 mL  Dose: 1,500 mg  Freq: Every 24 Hours Route: IV  Indications of Use: SKIN AND SOFT TISSUE INFECTION  Last Dose: 1,500 mg (02/25/24 1414)  Start: 02/24/24 1500 End: 02/28/24 1459    1455        1414        1500          vancomycin IVPB 1500 mg in 0.9% NaCl (Premix) 500 mL  Dose: 20 mg/kg  Weight Dosing Info: 77.9 kg  Freq: Once Route: IV  Indications of Use: SKIN AND SOFT TISSUE INFECTION  Last Dose: 1,500 mg (02/23/24 1905)  Start: 02/23/24 1804 End: 02/23/24 2035                     Continuous Meds Sorted by Name  for Roxanna Beck as of 2/24/24 through 2/26/24  Legend:       Medications 02/24/24 02/25/24 02/26/24   lactated ringers infusion  Rate: 75 mL/hr Dose: 75 mL/hr  Freq: Continuous Route: IV  Last Dose: 75 mL/hr (02/24/24 1324)  Start: 02/24/24 0500 End: 02/25/24 1115    0503   1143   1324      1115-D/C'd         Pharmacy to dose vancomycin  Freq: Continuous PRN Route: XX  PRN Reason: Consult  Indications of Use: SKIN AND SOFT TISSUE INFECTION  Start: 02/23/24 1819 End: 02/28/24 1818                     PRN Meds Sorted by Name  for Roxanna Beck as of 2/24/24 through 2/26/24  Legend:       Medications 02/24/24 02/25/24 02/26/24    sennosides-docusate (PERICOLACE) 8.6-50 MG per tablet 2 tablet  Dose: 2 tablet  Freq: 2 Times Daily PRN Route: PO  PRN Reason: Constipation  Start: 02/23/24 1818   Admin Instructions:            And  polyethylene glycol (MIRALAX) packet 17 g  Dose: 17 g  Freq: Daily PRN Route: PO  PRN Reason: Constipation  PRN Comment: Use if  senna-docusate is ineffective  Start: 02/23/24 1818   Admin Instructions:            And  bisacodyl (DULCOLAX) EC tablet 5 mg  Dose: 5 mg  Freq: Daily PRN Route: PO  PRN Reason: Constipation  PRN Comment: Use if polyethylene glycol is ineffective  Start: 02/23/24 1818   Admin Instructions:            And  bisacodyl (DULCOLAX) suppository 10 mg  Dose: 10 mg  Freq: Daily PRN Route: RE  PRN Reason: Constipation  PRN Comment: Use if bisacodyl oral is ineffective  Start: 02/23/24 1818   Admin Instructions:            cetirizine (zyrTEC) tablet 10 mg  Dose: 10 mg  Freq: Daily PRN Route: PO  PRN Reason: Allergies  Start: 02/23/24 2244         dextrose (D50W) (25 g/50 mL) IV injection 25 g  Dose: 25 g  Freq: Every 15 Minutes PRN Route: IV  PRN Reason: Low Blood Sugar  PRN Comment: Blood Sugar Less Than 70  Start: 02/23/24 1817   Admin Instructions:            dextrose (GLUTOSE) oral gel 15 g  Dose: 15 g  Freq: Every 15 Minutes PRN Route: PO  PRN Reason: Low Blood Sugar  PRN Comment: Blood sugar less than 70  Start: 02/23/24 1817   Admin Instructions:            gabapentin (NEURONTIN) capsule 300 mg  Dose: 300 mg  Freq: Nightly PRN Route: PO  PRN Comment: For pain  Start: 02/23/24 2244   Admin Instructions:       2038            glucagon (GLUCAGEN) injection 1 mg  Dose: 1 mg  Freq: Every 15 Minutes PRN Route: IM  PRN Reason: Low Blood Sugar  PRN Comment: Blood Glucose Less Than 70  Start: 02/23/24 1817   Admin Instructions:            ibuprofen (ADVIL,MOTRIN) tablet 400 mg  Dose: 400 mg  Freq: Every 4 Hours PRN Route: PO  PRN Reason: Moderate Pain  Start: 02/23/24 2244 End: 02/23/24 2246   Admin Instructions:            nitroglycerin (NITROSTAT) SL tablet 0.4 mg  Dose: 0.4 mg  Freq: Every 5 Minutes PRN Route: SL  PRN Reason: Chest Pain  PRN Comment: Only if SBP Greater Than 100  Start: 02/23/24 1817   Admin Instructions:            Pharmacy to dose vancomycin  Freq: Continuous PRN Route: XX  PRN Reason: Consult  Indications  of Use: SKIN AND SOFT TISSUE INFECTION  Start: 02/23/24 1819 End: 02/28/24 1818         sodium chloride 0.9 % flush 10 mL  Dose: 10 mL  Freq: As Needed Route: IV  PRN Reason: Line Care  Start: 02/23/24 1817         sodium chloride 0.9 % infusion 40 mL  Dose: 40 mL  Freq: As Needed Route: IV  PRN Reason: Line Care  Start: 02/23/24 1817   Admin Instructions:            SUMAtriptan (IMITREX) tablet 50 mg  Dose: 50 mg  Freq: Every 2 Hours PRN Route: PO  PRN Reason: Migraine  Start: 02/23/24 2245   Admin Instructions:       2038

## 2024-02-26 NOTE — PROGRESS NOTES
Discharge Planning Assessment  [facility]     Patient Name: Roxanna Beck  MRN: [id number]  Today's Date: 2/26/2024    Admit Date: 2/23/2024    Plan: Home with family and a walker from [facility]   Discharge Needs Assessment    No documentation.                  Discharge Plan       Row Name 02/26/24 1334       Plan    Plan Home with family and a walker from [facility]    Final Discharge Disposition Code 01 - home or self-care    Final Note Home                  Continued Care and Services - Discharged on 2/26/2024 Admission date: 2/23/2024 - Discharge disposition: Home or Self Care      Durable Medical Equipment       Service Provider Request Status Selected Services Address Phone Fax Patient Preferred    [facility] Pending - Request Sent N/A [id number] [id number] [phone] --                  Expected Discharge Date and Time       Expected Discharge Date Expected Discharge Time    Feb 26, 2024            Demographic Summary    No documentation.                  Functional Status    No documentation.                  Psychosocial    No documentation.                  Abuse/Neglect    No documentation.                  Legal    No documentation.                  Substance Abuse    No documentation.                  Patient Forms    No documentation.                     Tami Manjarrez, RN

## 2024-02-27 NOTE — OUTREACH NOTE
Prep Survey      Flowsheet Row Responses   [facility] patient discharged from? [facility]   Is LACE score < 7 ? No   Eligibility Readm Mgmt   Discharge diagnosis Diabetic ulcer right great toe   Does the patient have one of the following disease processes/diagnoses(primary or secondary)? General Surgery   Does the patient have Home health ordered? No   Is there a DME ordered? Yes   What DME was ordered? [facility] for    Prep survey completed? Yes            MARCO BABIN - Registered Nurse

## 2024-02-28 ENCOUNTER — READMISSION MANAGEMENT (OUTPATIENT)
Dept: CALL CENTER | Facility: HOSPITAL | Age: 59
End: 2024-02-28
Payer: MEDICAID

## 2024-02-28 LAB
BACTERIA SPEC AEROBE CULT: NORMAL
BACTERIA SPEC AEROBE CULT: NORMAL

## 2024-02-29 NOTE — OUTREACH NOTE
General Surgery Week 1 Survey      Flowsheet Row Responses   [facility] patient discharged from? [place]   Does the patient have one of the following disease processes/diagnoses(primary or secondary)? General Surgery   Week 1 attempt successful? No   Unsuccessful attempts Attempt 1   Discharge diagnosis Diabetic ulcer right great toe            JULIO COLLIER - Registered Nurse

## 2024-03-07 ENCOUNTER — READMISSION MANAGEMENT (OUTPATIENT)
Dept: CALL CENTER | Facility: HOSPITAL | Age: 59
End: 2024-03-07
Payer: MEDICAID

## 2024-03-07 NOTE — OUTREACH NOTE
General Surgery Week 2 Survey      Flowsheet Row Responses   [facility] patient discharged from? [place]   Does the patient have one of the following disease processes/diagnoses(primary or secondary)? General Surgery   Week 2 attempt successful? Yes   Call start time [phone]   Call end time 0927   Discharge diagnosis Diabetic ulcer right great toe   Person spoke with today (if not patient) and relationship patient   Meds reviewed with patient/caregiver? Yes   Is the patient having any side effects they believe may be caused by any medication additions or changes? No   Does the patient have all medications related to this admission filled (includes all antibiotics, pain medications, etc.) Yes   Is the patient taking all medications as directed (includes completed medication regime)? Yes   Does the patient have a follow up appointment scheduled with their surgeon? Yes   Has the patient kept scheduled appointments due by today? Yes   Comments Has been back to see Dr. Mac and will see him again in 2 weeks.   What DME was ordered? [facility] for [facility]   Did the patient receive a copy of their discharge instructions? Yes   Nursing interventions Reviewed instructions with patient   What is the patient's perception of their health status since discharge? Improving   Nursing interventions Nurse provided patient education   Is the patient/caregiver able to teach back signs and symptoms of incisional infection? Increased redness, swelling or pain at the incisonal site, Incisional warmth, Fever, Pus or odor from incision, Increased drainage or bleeding   Is the patient/caregiver able to teach back steps to recovery at home? Set small, achievable goals for return to baseline health, Rest and rebuild strength, gradually increase activity   If the patient is a current smoker, are they able to teach back resources for cessation? Not a smoker   Is the patient/caregiver able to teach back the hierarchy of who to call/visit for  symptoms/problems? PCP, Specialist, Home health nurse, Urgent Care, ED, 911 Yes   Week 2 call completed? Yes   Graduated Yes   Is the patient interested in additional calls from an ambulatory ? No   Would this patient benefit from a Referral to [facility] Social Work? No   Graduated/Revoked comments Patient has all medications and has had a followup. She is still on antibiotics. Has seen Dr. Zapata and will see him again in a couple of weeks. She reports wound is not healing as fast as she would hope.   Call end time [phone]            Dean DINH - Registered Nurse

## 2024-03-21 ENCOUNTER — APPOINTMENT (OUTPATIENT)
Dept: GENERAL RADIOLOGY | Facility: HOSPITAL | Age: 59
End: 2024-03-21
Payer: MEDICAID

## 2024-03-21 ENCOUNTER — HOSPITAL ENCOUNTER (INPATIENT)
Facility: HOSPITAL | Age: 59
LOS: 5 days | Discharge: HOME HEALTH | End: 2024-03-26
Attending: EMERGENCY MEDICINE | Admitting: INTERNAL MEDICINE
Payer: MEDICAID

## 2024-03-21 DIAGNOSIS — E11.628 DIABETIC FOOT INFECTION: ICD-10-CM

## 2024-03-21 DIAGNOSIS — L08.9 DIABETIC FOOT INFECTION: ICD-10-CM

## 2024-03-21 DIAGNOSIS — G93.41 ACUTE METABOLIC ENCEPHALOPATHY: Primary | ICD-10-CM

## 2024-03-21 LAB
ALBUMIN SERPL-MCNC: 3.5 G/DL (ref 3.5–5.2)
ALBUMIN/GLOB SERPL: 1.1 G/DL
ALP SERPL-CCNC: 113 U/L (ref 39–117)
ALT SERPL W P-5'-P-CCNC: 35 U/L (ref 1–33)
ANION GAP SERPL CALCULATED.3IONS-SCNC: 13.2 MMOL/L (ref 5–15)
AST SERPL-CCNC: 27 U/L (ref 1–32)
B PARAPERT DNA SPEC QL NAA+PROBE: NOT DETECTED
B PERT DNA SPEC QL NAA+PROBE: NOT DETECTED
BASOPHILS # BLD AUTO: 0.02 10*3/MM3 (ref 0–0.2)
BASOPHILS NFR BLD AUTO: 0.2 % (ref 0–1.5)
BILIRUB SERPL-MCNC: 0.5 MG/DL (ref 0–1.2)
BILIRUB UR QL STRIP: NEGATIVE
BUN SERPL-MCNC: 22 MG/DL (ref 6–20)
BUN/CREAT SERPL: 23.7 (ref 7–25)
C PNEUM DNA NPH QL NAA+NON-PROBE: NOT DETECTED
CALCIUM SPEC-SCNC: 8.6 MG/DL (ref 8.6–10.5)
CHLORIDE SERPL-SCNC: 95 MMOL/L (ref 98–107)
CLARITY UR: CLEAR
CO2 SERPL-SCNC: 23.8 MMOL/L (ref 22–29)
COLOR UR: YELLOW
CREAT SERPL-MCNC: 0.93 MG/DL (ref 0.57–1)
D-LACTATE SERPL-SCNC: 1.8 MMOL/L (ref 0.5–2)
D-LACTATE SERPL-SCNC: 2.4 MMOL/L (ref 0.5–2)
DEPRECATED RDW RBC AUTO: 38.2 FL (ref 37–54)
EGFRCR SERPLBLD CKD-EPI 2021: 70.9 ML/MIN/1.73
EOSINOPHIL # BLD AUTO: 0 10*3/MM3 (ref 0–0.4)
EOSINOPHIL NFR BLD AUTO: 0 % (ref 0.3–6.2)
ERYTHROCYTE [DISTWIDTH] IN BLOOD BY AUTOMATED COUNT: 11.7 % (ref 12.3–15.4)
FLUAV SUBTYP SPEC NAA+PROBE: NOT DETECTED
FLUBV RNA ISLT QL NAA+PROBE: NOT DETECTED
GLOBULIN UR ELPH-MCNC: 3.1 GM/DL
GLUCOSE BLDC GLUCOMTR-MCNC: 322 MG/DL (ref 70–130)
GLUCOSE BLDC GLUCOMTR-MCNC: 334 MG/DL (ref 70–130)
GLUCOSE SERPL-MCNC: 322 MG/DL (ref 65–99)
GLUCOSE UR STRIP-MCNC: ABNORMAL MG/DL
HADV DNA SPEC NAA+PROBE: NOT DETECTED
HCOV 229E RNA SPEC QL NAA+PROBE: NOT DETECTED
HCOV HKU1 RNA SPEC QL NAA+PROBE: NOT DETECTED
HCOV NL63 RNA SPEC QL NAA+PROBE: NOT DETECTED
HCOV OC43 RNA SPEC QL NAA+PROBE: NOT DETECTED
HCT VFR BLD AUTO: 37.1 % (ref 34–46.6)
HGB BLD-MCNC: 12.2 G/DL (ref 12–15.9)
HGB UR QL STRIP.AUTO: NEGATIVE
HMPV RNA NPH QL NAA+NON-PROBE: NOT DETECTED
HPIV1 RNA ISLT QL NAA+PROBE: NOT DETECTED
HPIV2 RNA SPEC QL NAA+PROBE: NOT DETECTED
HPIV3 RNA NPH QL NAA+PROBE: NOT DETECTED
HPIV4 P GENE NPH QL NAA+PROBE: NOT DETECTED
KETONES UR QL STRIP: ABNORMAL
LEUKOCYTE ESTERASE UR QL STRIP.AUTO: NEGATIVE
LYMPHOCYTES # BLD AUTO: 0.6 10*3/MM3 (ref 0.7–3.1)
LYMPHOCYTES NFR BLD AUTO: 6.3 % (ref 19.6–45.3)
M PNEUMO IGG SER IA-ACNC: NOT DETECTED
MCH RBC QN AUTO: 29.4 PG (ref 26.6–33)
MCHC RBC AUTO-ENTMCNC: 32.9 G/DL (ref 31.5–35.7)
MCV RBC AUTO: 89.4 FL (ref 79–97)
MONOCYTES # BLD AUTO: 0.98 10*3/MM3 (ref 0.1–0.9)
MONOCYTES NFR BLD AUTO: 10.2 % (ref 5–12)
NEUTROPHILS NFR BLD AUTO: 7.93 10*3/MM3 (ref 1.7–7)
NEUTROPHILS NFR BLD AUTO: 82.7 % (ref 42.7–76)
NITRITE UR QL STRIP: NEGATIVE
PH UR STRIP.AUTO: 6.5 [PH] (ref 5–8)
PLATELET # BLD AUTO: 147 10*3/MM3 (ref 140–450)
PMV BLD AUTO: 9.7 FL (ref 6–12)
POTASSIUM SERPL-SCNC: 4.8 MMOL/L (ref 3.5–5.2)
PROCALCITONIN SERPL-MCNC: 0.27 NG/ML (ref 0–0.25)
PROT SERPL-MCNC: 6.6 G/DL (ref 6–8.5)
PROT UR QL STRIP: NEGATIVE
RBC # BLD AUTO: 4.15 10*6/MM3 (ref 3.77–5.28)
RHINOVIRUS RNA SPEC NAA+PROBE: NOT DETECTED
RSV RNA NPH QL NAA+NON-PROBE: NOT DETECTED
SARS-COV-2 RNA NPH QL NAA+NON-PROBE: NOT DETECTED
SODIUM SERPL-SCNC: 132 MMOL/L (ref 136–145)
SP GR UR STRIP: >=1.03 (ref 1–1.03)
UROBILINOGEN UR QL STRIP: ABNORMAL
WBC NRBC COR # BLD AUTO: 9.59 10*3/MM3 (ref 3.4–10.8)

## 2024-03-21 PROCEDURE — 87154 CUL TYP ID BLD PTHGN 6+ TRGT: CPT | Performed by: EMERGENCY MEDICINE

## 2024-03-21 PROCEDURE — P9612 CATHETERIZE FOR URINE SPEC: HCPCS

## 2024-03-21 PROCEDURE — 80053 COMPREHEN METABOLIC PANEL: CPT | Performed by: EMERGENCY MEDICINE

## 2024-03-21 PROCEDURE — 71045 X-RAY EXAM CHEST 1 VIEW: CPT

## 2024-03-21 PROCEDURE — 82948 REAGENT STRIP/BLOOD GLUCOSE: CPT

## 2024-03-21 PROCEDURE — 25810000003 SEPSIS FLUID NS 0.9 % SOLUTION: Performed by: EMERGENCY MEDICINE

## 2024-03-21 PROCEDURE — 63710000001 INSULIN LISPRO (HUMAN) PER 5 UNITS: Performed by: INTERNAL MEDICINE

## 2024-03-21 PROCEDURE — 81003 URINALYSIS AUTO W/O SCOPE: CPT | Performed by: EMERGENCY MEDICINE

## 2024-03-21 PROCEDURE — 83605 ASSAY OF LACTIC ACID: CPT | Performed by: EMERGENCY MEDICINE

## 2024-03-21 PROCEDURE — 25010000002 CEFAZOLIN PER 500 MG: Performed by: EMERGENCY MEDICINE

## 2024-03-21 PROCEDURE — 87040 BLOOD CULTURE FOR BACTERIA: CPT | Performed by: EMERGENCY MEDICINE

## 2024-03-21 PROCEDURE — 87077 CULTURE AEROBIC IDENTIFY: CPT | Performed by: EMERGENCY MEDICINE

## 2024-03-21 PROCEDURE — 99285 EMERGENCY DEPT VISIT HI MDM: CPT

## 2024-03-21 PROCEDURE — 87186 SC STD MICRODIL/AGAR DIL: CPT | Performed by: EMERGENCY MEDICINE

## 2024-03-21 PROCEDURE — 0202U NFCT DS 22 TRGT SARS-COV-2: CPT | Performed by: EMERGENCY MEDICINE

## 2024-03-21 PROCEDURE — 36415 COLL VENOUS BLD VENIPUNCTURE: CPT | Performed by: EMERGENCY MEDICINE

## 2024-03-21 PROCEDURE — 85025 COMPLETE CBC W/AUTO DIFF WBC: CPT | Performed by: EMERGENCY MEDICINE

## 2024-03-21 PROCEDURE — 84145 PROCALCITONIN (PCT): CPT | Performed by: EMERGENCY MEDICINE

## 2024-03-21 RX ORDER — DEXTROSE MONOHYDRATE 25 G/50ML
25 INJECTION, SOLUTION INTRAVENOUS
Status: DISCONTINUED | OUTPATIENT
Start: 2024-03-21 | End: 2024-03-26 | Stop reason: HOSPADM

## 2024-03-21 RX ORDER — INSULIN LISPRO 100 [IU]/ML
5 INJECTION, SOLUTION INTRAVENOUS; SUBCUTANEOUS
Status: DISCONTINUED | OUTPATIENT
Start: 2024-03-22 | End: 2024-03-26 | Stop reason: HOSPADM

## 2024-03-21 RX ORDER — ONDANSETRON 2 MG/ML
4 INJECTION INTRAMUSCULAR; INTRAVENOUS EVERY 6 HOURS PRN
Status: DISCONTINUED | OUTPATIENT
Start: 2024-03-21 | End: 2024-03-26 | Stop reason: HOSPADM

## 2024-03-21 RX ORDER — ONDANSETRON 4 MG/1
4 TABLET, ORALLY DISINTEGRATING ORAL EVERY 6 HOURS PRN
Status: DISCONTINUED | OUTPATIENT
Start: 2024-03-21 | End: 2024-03-26 | Stop reason: HOSPADM

## 2024-03-21 RX ORDER — PANTOPRAZOLE SODIUM 40 MG/1
40 TABLET, DELAYED RELEASE ORAL
Status: DISCONTINUED | OUTPATIENT
Start: 2024-03-22 | End: 2024-03-26 | Stop reason: HOSPADM

## 2024-03-21 RX ORDER — INSULIN LISPRO 100 [IU]/ML
2-7 INJECTION, SOLUTION INTRAVENOUS; SUBCUTANEOUS
Status: DISCONTINUED | OUTPATIENT
Start: 2024-03-21 | End: 2024-03-26 | Stop reason: HOSPADM

## 2024-03-21 RX ORDER — LISINOPRIL 20 MG/1
20 TABLET ORAL DAILY
Status: DISCONTINUED | OUTPATIENT
Start: 2024-03-22 | End: 2024-03-26 | Stop reason: HOSPADM

## 2024-03-21 RX ORDER — IBUPROFEN 600 MG/1
1 TABLET ORAL
Status: DISCONTINUED | OUTPATIENT
Start: 2024-03-21 | End: 2024-03-26 | Stop reason: HOSPADM

## 2024-03-21 RX ORDER — POLYETHYLENE GLYCOL 3350 17 G/17G
17 POWDER, FOR SOLUTION ORAL DAILY PRN
Status: DISCONTINUED | OUTPATIENT
Start: 2024-03-21 | End: 2024-03-26 | Stop reason: HOSPADM

## 2024-03-21 RX ORDER — ACETAMINOPHEN 500 MG
1000 TABLET ORAL ONCE
Status: DISCONTINUED | OUTPATIENT
Start: 2024-03-21 | End: 2024-03-26 | Stop reason: HOSPADM

## 2024-03-21 RX ORDER — UREA 10 %
3 LOTION (ML) TOPICAL NIGHTLY PRN
Status: DISCONTINUED | OUTPATIENT
Start: 2024-03-21 | End: 2024-03-26 | Stop reason: HOSPADM

## 2024-03-21 RX ORDER — NICOTINE POLACRILEX 4 MG
15 LOZENGE BUCCAL
Status: DISCONTINUED | OUTPATIENT
Start: 2024-03-21 | End: 2024-03-26 | Stop reason: HOSPADM

## 2024-03-21 RX ORDER — AMOXICILLIN 250 MG
2 CAPSULE ORAL 2 TIMES DAILY PRN
Status: DISCONTINUED | OUTPATIENT
Start: 2024-03-21 | End: 2024-03-26 | Stop reason: HOSPADM

## 2024-03-21 RX ORDER — ACETAMINOPHEN 325 MG/1
650 TABLET ORAL EVERY 4 HOURS PRN
Status: DISCONTINUED | OUTPATIENT
Start: 2024-03-21 | End: 2024-03-26 | Stop reason: HOSPADM

## 2024-03-21 RX ORDER — PIOGLITAZONEHYDROCHLORIDE 30 MG/1
1 TABLET ORAL DAILY
COMMUNITY

## 2024-03-21 RX ORDER — SUMATRIPTAN 50 MG/1
1 TABLET, FILM COATED ORAL
COMMUNITY

## 2024-03-21 RX ORDER — DULOXETIN HYDROCHLORIDE 60 MG/1
60 CAPSULE, DELAYED RELEASE ORAL DAILY
Status: DISCONTINUED | OUTPATIENT
Start: 2024-03-22 | End: 2024-03-26 | Stop reason: HOSPADM

## 2024-03-21 RX ORDER — PAROXETINE HYDROCHLORIDE 20 MG/1
1 TABLET, FILM COATED ORAL EVERY MORNING
COMMUNITY

## 2024-03-21 RX ORDER — BISACODYL 10 MG
10 SUPPOSITORY, RECTAL RECTAL DAILY PRN
Status: DISCONTINUED | OUTPATIENT
Start: 2024-03-21 | End: 2024-03-26 | Stop reason: HOSPADM

## 2024-03-21 RX ORDER — ROSUVASTATIN CALCIUM 10 MG/1
10 TABLET, COATED ORAL DAILY
Status: DISCONTINUED | OUTPATIENT
Start: 2024-03-22 | End: 2024-03-26 | Stop reason: HOSPADM

## 2024-03-21 RX ORDER — SODIUM CHLORIDE 9 MG/ML
75 INJECTION, SOLUTION INTRAVENOUS CONTINUOUS
Status: DISCONTINUED | OUTPATIENT
Start: 2024-03-21 | End: 2024-03-23

## 2024-03-21 RX ORDER — BISACODYL 5 MG/1
5 TABLET, DELAYED RELEASE ORAL DAILY PRN
Status: DISCONTINUED | OUTPATIENT
Start: 2024-03-21 | End: 2024-03-26 | Stop reason: HOSPADM

## 2024-03-21 RX ORDER — INSULIN GLARGINE 100 [IU]/ML
50 INJECTION, SOLUTION SUBCUTANEOUS DAILY
COMMUNITY

## 2024-03-21 RX ORDER — ACETAMINOPHEN 650 MG/1
650 SUPPOSITORY RECTAL ONCE
Status: COMPLETED | OUTPATIENT
Start: 2024-03-21 | End: 2024-03-21

## 2024-03-21 RX ORDER — CEPHALEXIN 500 MG/1
500 CAPSULE ORAL 2 TIMES DAILY
COMMUNITY
Start: 2024-03-19 | End: 2024-03-26 | Stop reason: HOSPADM

## 2024-03-21 RX ADMIN — INSULIN LISPRO 5 UNITS: 100 INJECTION, SOLUTION INTRAVENOUS; SUBCUTANEOUS at 21:13

## 2024-03-21 RX ADMIN — CEFAZOLIN 1000 MG: 1 INJECTION, POWDER, FOR SOLUTION INTRAMUSCULAR; INTRAVENOUS at 16:33

## 2024-03-21 RX ADMIN — SODIUM CHLORIDE 75 ML/HR: 9 INJECTION, SOLUTION INTRAVENOUS at 21:13

## 2024-03-21 RX ADMIN — ACETAMINOPHEN 650 MG: 650 SUPPOSITORY RECTAL at 15:18

## 2024-03-21 RX ADMIN — SODIUM CHLORIDE 1944 ML: 9 INJECTION, SOLUTION INTRAVENOUS at 14:57

## 2024-03-21 RX ADMIN — ACETAMINOPHEN 325MG 650 MG: 325 TABLET ORAL at 23:40

## 2024-03-21 NOTE — ED NOTES
Nursing report ED to floor  Roxanna Beck  58 y.o.  female    HPI :  HPI (Adult)  Stated Reason for Visit: AMS, fever, fall, incotince that began last night approx. 2130  History Obtained From: EMS  Precipitating Event(s): none    Chief Complaint  Chief Complaint   Patient presents with    Altered Mental Status       Admitting doctor:   Victoria Roche MD    Admitting diagnosis:   The primary encounter diagnosis was Acute metabolic encephalopathy. A diagnosis of Diabetic foot infection was also pertinent to this visit.    Code status:   Current Code Status       Date Active Code Status Order ID Comments User Context       3/21/2024 1657 CPR (Attempt to Resuscitate) [id number]  Victoria Roche MD ED        Question Answer    Code Status (Patient has no pulse and is not breathing) CPR (Attempt to Resuscitate)    Medical Interventions (Patient has pulse or is breathing) Full                    Allergies:   Codeine, Percocet [oxycodone-acetaminophen], Adhesive tape, and Toradol [ketorolac tromethamine]    Isolation:   No active isolations    Intake and Output  No intake or output data in the 24 hours ending 03/21/24 1715    Weight:       03/21/24  1459   Weight: 80.7 kg (178 lb)       Most recent vitals:   Vitals:    03/21/24 1603 03/21/24 1606 03/21/24 1630 03/21/24 1706   BP: 146/85 (!) 158/105  138/84   Pulse: 104 104  103   Resp:  20     Temp:   99.8 °F (37.7 °C)    TempSrc:   Tympanic    SpO2: 94% 96%  97%   Weight:       Height:           Active LDAs/IV Access:   Lines, Drains & Airways       Active LDAs       Name Placement date Placement time Site Days    Peripheral IV 03/21/24 1439 Left Antecubital 03/21/24  1439  Antecubital  less than 1    External Urinary Catheter 03/21/24  1524  --  less than 1                    Labs (abnormal labs have a star):   Labs Reviewed   COMPREHENSIVE METABOLIC PANEL - Abnormal; Notable for the following components:       Result Value    Glucose 322 (*)     BUN 22 (*)  "    Sodium 132 (*)     Chloride 95 (*)     ALT (SGPT) 35 (*)     All other components within normal limits    Narrative:     GFR Normal >60  Chronic Kidney Disease <60  Kidney Failure <15     LACTIC ACID, PLASMA - Abnormal; Notable for the following components:    Lactate 2.4 (*)     All other components within normal limits   PROCALCITONIN - Abnormal; Notable for the following components:    Procalcitonin 0.27 (*)     All other components within normal limits    Narrative:     As a Marker for Sepsis (Non-Neonates):    1. <0.5 ng/mL represents a low risk of severe sepsis and/or septic shock.  2. >2 ng/mL represents a high risk of severe sepsis and/or septic shock.    As a Marker for Lower Respiratory Tract Infections that require antibiotic therapy:    PCT on Admission    Antibiotic Therapy       6-12 Hrs later    >0.5                Strongly Recommended  >0.25 - <0.5        Recommended   0.1 - 0.25          Discouraged              Remeasure/reassess PCT  <0.1                Strongly Discouraged     Remeasure/reassess PCT    As 28 day mortality risk marker: \"Change in Procalcitonin Result\" (>80% or <=80%) if Day 0 (or Day 1) and Day 4 values are available. Refer to http://www.[email or web address]cal[place]    Change in PCT <=80%  A decrease of PCT levels below or equal to 80% defines a positive change in PCT test result representing a higher risk for 28-day all-cause mortality of patients diagnosed with severe sepsis for septic shock.    Change in PCT >80%  A decrease of PCT levels of more than 80% defines a negative change in PCT result representing a lower risk for 28-day all-cause mortality of patients diagnosed with severe sepsis or septic shock.      URINALYSIS W/ MICROSCOPIC IF INDICATED (NO CULTURE) - Abnormal; Notable for the following components:    Glucose, UA >=1000 mg/dL (3+) (*)     Ketones, UA Trace (*)     All other components within normal limits    Narrative:     Urine microscopic not indicated.   CBC " WITH AUTO DIFFERENTIAL - Abnormal; Notable for the following components:    RDW 11.7 (*)     Neutrophil % 82.7 (*)     Lymphocyte % 6.3 (*)     Eosinophil % 0.0 (*)     Neutrophils, Absolute 7.93 (*)     Lymphocytes, Absolute 0.60 (*)     Monocytes, Absolute 0.98 (*)     All other components within normal limits   POCT GLUCOSE FINGERSTICK - Abnormal; Notable for the following components:    Glucose 334 (*)     All other components within normal limits   RESPIRATORY PANEL PCR W/ COVID-19 (SARS-COV-2), NP SWAB IN UTM/VTP, 2 HR TAT - Normal    Narrative:     In the setting of a positive respiratory panel with a viral infection PLUS a negative procalcitonin without other underlying concern for bacterial infection, consider observing off antibiotics or discontinuation of antibiotics and continue supportive care. If the respiratory panel is positive for atypical bacterial infection (Bordetella pertussis, Chlamydophila pneumoniae, or Mycoplasma pneumoniae), consider antibiotic de-escalation to target atypical bacterial infection.   BLOOD CULTURE   BLOOD CULTURE   LACTIC ACID, REFLEX   CBC AND DIFFERENTIAL    Narrative:     The following orders were created for panel order CBC & Differential.  Procedure                               Abnormality         Status                     ---------                               -----------         ------                     CBC Auto Differential[[id number]]        Abnormal            Final result                 Please view results for these tests on the individual orders.       EKG:   No orders to display       Meds given in ED:   Medications   acetaminophen (TYLENOL) tablet 1,000 mg (1,000 mg Oral Not Given 3/21/24 1518)   acetaminophen (TYLENOL) tablet 650 mg (has no administration in time range)   ondansetron ODT (ZOFRAN-ODT) disintegrating tablet 4 mg (has no administration in time range)     Or   ondansetron (ZOFRAN) injection 4 mg (has no administration in time range)    melatonin tablet 3 mg (has no administration in time range)   sennosides-docusate (PERICOLACE) 8.6-50 MG per tablet 2 tablet (has no administration in time range)     And   polyethylene glycol (MIRALAX) packet 17 g (has no administration in time range)     And   bisacodyl (DULCOLAX) EC tablet 5 mg (has no administration in time range)     And   bisacodyl (DULCOLAX) suppository 10 mg (has no administration in time range)   sepsis fluid NS 0.9 % bolus 1,944 mL (1,944 mL Intravenous New Bag 3/21/24 1457)   acetaminophen (TYLENOL) suppository 650 mg (650 mg Rectal Given 3/21/24 1518)   ceFAZolin 1000 mg IVPB in 100 mL NS (MBP) (1,000 mg Intravenous New Bag 3/21/24 1633)       Imaging results:  No radiology results for the last day    Ambulatory status:   - bedrest    Social issues:   Social History     Socioeconomic History    Marital status: Single    Number of children: 1    Years of education: High School   Tobacco Use    Smoking status: Never    Smokeless tobacco: Never   Vaping Use    Vaping status: Never Used   Substance and Sexual Activity    Alcohol use: No    Drug use: No    Sexual activity: Defer       Peripheral Neurovascular  Peripheral Neurovascular (Adult)  Peripheral Neurovascular WDL: .WDL except, neurovascular assessment lower  LLE Neurovascular Assessment  Temperature LLE: warm  Color LLE: no discoloration  Sensation LLE: numbness present, tingling present  RLE Neurovascular Assessment  Temperature RLE: warm  Color RLE: no discoloration  Sensation RLE: numbness present, tingling present    Neuro Cognitive  Neuro Cognitive (Adult)  Cognitive/Neuro/Behavioral WDL: .WDL except, orientation  Orientation: time, disoriented to    Learning  Learning Assessment (Adult)  Learning Readiness and Ability: cognitive limitation noted (Change from baseline.)    Respiratory  Respiratory WDL  Respiratory WDL: WDL    Abdominal Pain  Safety Interventions  Safety Precautions/Falls Reduction: assistive device/personal  items within reach, family at bedside, fall reduction program maintained  All Alarms: none present    Pain Assessments  Pain (Adult)  (0-10) Pain Rating: Rest: 0  (0-10) Pain Rating: Activity: 0  Response to Pain Interventions: interventions effective per patient    NIH Stroke Scale       Vinicius Ramirez RN  03/21/24 17:15 EDT

## 2024-03-21 NOTE — ED PROVIDER NOTES
EMERGENCY DEPARTMENT ENCOUNTER  Room Number:  27/27  PCP: Donato Vilchis DO  Independent Historians: Patient, EMS who brought patient      HPI:  Chief Complaint: had concerns including Altered Mental Status.     A complete HPI/ROS/PMH/PSH/SH/FH are unobtainable due to: Confusion, altered mental status  Chronic or social conditions impacting patient care (Social Determinants of Health):       Context: Roxanna Beck is a 58 y.o. female with a medical history of diabetes, hypertension and hyperlipidemia who presents to the ED c/o acute confusion.  Patient states she started today with nausea and vomiting has not kept a light down today.  Denies fever at home but was febrile with EMS with reported temperature of 103.2.  Patient denies chest pain or trouble breathing.  She denies abdominal pain.  She denies diarrhea.  She states she has been treated for a toe infection and has been followed by Dr. Zapata as outpatient.      Review of prior external notes (non-ED) -and- Review of prior external test results outside of this encounter:   I reviewed prior medical records note patient was hospitalized here about 1 month ago with diabetic ulcer of the right great toe.      Prescription drug monitoring program review:         PAST MEDICAL HISTORY  Active Ambulatory Problems     Diagnosis Date Noted    Lumbar disc herniation 12/15/2016    Migraine 12/29/2021    Hyperglycemia 02/23/2024    Diabetic ulcer of right great toe 02/23/2024    Diabetic peripheral neuropathy 12/23/2020    Generalized anxiety disorder 12/23/2020    Insulin dependent type 2 diabetes mellitus 11/24/2021    Migraine headache 12/23/2020    Hypertension 06/23/2021    Hyperglycemia due to diabetes mellitus 02/25/2024     Resolved Ambulatory Problems     Diagnosis Date Noted    Lactic acidosis 02/23/2024     Past Medical History:   Diagnosis Date    Arthritis     Depression     Diabetes mellitus     Dizzy     Fibromyalgia     GERD (gastroesophageal  reflux disease)     History of kidney stones     HNP (herniated nucleus pulposus)     Hyperlipidemia     Wears glasses          PAST SURGICAL HISTORY  Past Surgical History:   Procedure Laterality Date    BACK SURGERY      L4-5 Discectomy    CARPAL TUNNEL RELEASE      left     SECTION      CHOLECYSTECTOMY      COLONOSCOPY      ENDOSCOPY      HYSTERECTOMY      KIDNEY STONE SURGERY      LUMBAR DISCECTOMY N/A 12/15/2016    Procedure: LUMBAR DISCECTOMY L3-4;  Surgeon: Sandro Cuenca MD;  Location: [facility];  Service:     OOPHORECTOMY      TONSILLECTOMY      WISDOM TOOTH EXTRACTION           FAMILY HISTORY  Family History   Problem Relation Age of Onset    Lung cancer Mother     Cancer Mother     Heart attack Father     Heart disease Father     Diabetes Sister     Hypertension Sister     Breast cancer Sister 70    Diabetes Brother     Hypertension Brother     Heart disease Paternal Grandmother     Heart disease Paternal Grandfather     Breast cancer Cousin         early 50's    Breast cancer Maternal Aunt         50's    Breast cancer Maternal Aunt         50's    Breast cancer Maternal Aunt         50's    Breast cancer Maternal Aunt         50's    Breast cancer Maternal Aunt         50's    Endometrial cancer Neg Hx     Ovarian cancer Neg Hx          SOCIAL HISTORY  Social History     Socioeconomic History    Marital status: Single    Number of children: 1    Years of education: High School   Tobacco Use    Smoking status: Never    Smokeless tobacco: Never   Vaping Use    Vaping status: Never Used   Substance and Sexual Activity    Alcohol use: No    Drug use: No    Sexual activity: Defer         ALLERGIES  Codeine, Percocet [oxycodone-acetaminophen], Adhesive tape, and Toradol [ketorolac tromethamine]      REVIEW OF SYSTEMS  Review of Systems   Constitutional:  Positive for fatigue and fever.   Respiratory:  Negative for shortness of breath.    Cardiovascular:  Negative for chest pain.   Gastrointestinal:   Positive for nausea and vomiting.   Skin:         Toe ulceration on the right great toe   All other systems reviewed and are negative.    Included in HPI  All systems reviewed and negative except for those discussed in HPI.      PHYSICAL EXAM    I have reviewed the triage vital signs and nursing notes.    ED Triage Vitals   Temp Pulse Resp BP SpO2   -- -- -- -- --      Temp src Heart Rate Source Patient Position BP Location FiO2 (%)   -- -- -- -- --       Physical Exam  GENERAL: Alert female in mild distress.  Triage vitals notable for temperature of 103 with EMS.  She is tachycardic to the 114 range  SKIN: Warm, dry examination of the right foot reveals a roughly 0.5 x 0.5 cm ulcer of the right great toe without significant odor or erythema.  HENT: Normocephalic, atraumatic  EYES: no scleral icterus  CV: Tachycardic without murmur  RESPIRATORY: normal effort, lungs clear-O2 sats upper 90s room air  ABDOMEN: soft, nontender, mildly obese  MUSCULOSKELETAL: no deformity  NEURO: alert, moves all extremities, follows commands      LAB RESULTS  Recent Results (from the past 24 hour(s))   POC Glucose Once    Collection Time: 03/21/24  2:47 PM    Specimen: Blood   Result Value Ref Range    Glucose 334 (H) 70 - 130 mg/dL   Respiratory Panel PCR w/COVID-19(SARS-CoV-2) KEERTHI/BEAU/CRISTIANO/PAD/[facility]/HUGO In-House, NP Swab in UTM/VT, 2 HR TAT - Swab, Nasopharynx    Collection Time: 03/21/24  2:56 PM    Specimen: Nasopharynx; Swab   Result Value Ref Range    ADENOVIRUS, PCR Not Detected Not Detected    Coronavirus 229E Not Detected Not Detected    Coronavirus HKU1 Not Detected Not Detected    Coronavirus NL63 Not Detected Not Detected    Coronavirus OC43 Not Detected Not Detected    COVID19 Not Detected Not Detected - Ref. Range    Human Metapneumovirus Not Detected Not Detected    Human Rhinovirus/Enterovirus Not Detected Not Detected    Influenza A PCR Not Detected Not Detected    Influenza B PCR Not Detected Not Detected     Parainfluenza Virus 1 Not Detected Not Detected    Parainfluenza Virus 2 Not Detected Not Detected    Parainfluenza Virus 3 Not Detected Not Detected    Parainfluenza Virus 4 Not Detected Not Detected    RSV, PCR Not Detected Not Detected    Bordetella pertussis pcr Not Detected Not Detected    Bordetella parapertussis PCR Not Detected Not Detected    Chlamydophila pneumoniae PCR Not Detected Not Detected    Mycoplasma pneumo by PCR Not Detected Not Detected   Urinalysis With Microscopic If Indicated (No Culture) - Straight Cath    Collection Time: 03/21/24  3:22 PM    Specimen: Straight Cath; Urine   Result Value Ref Range    Color, UA Yellow Yellow, Straw    Appearance, UA Clear Clear    pH, UA 6.5 5.0 - 8.0    Specific Gravity, UA >=1.030 1.005 - 1.030    Glucose, UA >=1000 mg/dL (3+) (A) Negative    Ketones, UA Trace (A) Negative    Bilirubin, UA Negative Negative    Blood, UA Negative Negative    Protein, UA Negative Negative    Leuk Esterase, UA Negative Negative    Nitrite, UA Negative Negative    Urobilinogen, UA 0.2 E.U./dL 0.2 - 1.0 E.U./dL   Comprehensive Metabolic Panel    Collection Time: 03/21/24  3:27 PM    Specimen: Blood   Result Value Ref Range    Glucose 322 (H) 65 - 99 mg/dL    BUN 22 (H) 6 - 20 mg/dL    Creatinine 0.93 0.57 - 1.00 mg/dL    Sodium 132 (L) 136 - 145 mmol/L    Potassium 4.8 3.5 - 5.2 mmol/L    Chloride 95 (L) 98 - 107 mmol/L    CO2 23.8 22.0 - 29.0 mmol/L    Calcium 8.6 8.6 - 10.5 mg/dL    Total Protein 6.6 6.0 - 8.5 g/dL    Albumin 3.5 3.5 - 5.2 g/dL    ALT (SGPT) 35 (H) 1 - 33 U/L    AST (SGOT) 27 1 - 32 U/L    Alkaline Phosphatase 113 39 - 117 U/L    Total Bilirubin 0.5 0.0 - 1.2 mg/dL    Globulin 3.1 gm/dL    A/G Ratio 1.1 g/dL    BUN/Creatinine Ratio 23.7 7.0 - 25.0    Anion Gap 13.2 5.0 - 15.0 mmol/L    eGFR 70.9 >60.0 mL/min/1.73   Lactic Acid, Plasma    Collection Time: 03/21/24  3:27 PM    Specimen: Blood   Result Value Ref Range    Lactate 2.4 (C) 0.5 - 2.0 mmol/L    Procalcitonin    Collection Time: 03/21/24  3:27 PM    Specimen: Blood   Result Value Ref Range    Procalcitonin 0.27 (H) 0.00 - 0.25 ng/mL   CBC Auto Differential    Collection Time: 03/21/24  3:27 PM    Specimen: Blood   Result Value Ref Range    WBC 9.59 3.40 - 10.80 10*3/mm3    RBC 4.15 3.77 - 5.28 10*6/mm3    Hemoglobin 12.2 12.0 - 15.9 g/dL    Hematocrit 37.1 34.0 - 46.6 %    MCV 89.4 79.0 - 97.0 fL    MCH 29.4 26.6 - 33.0 pg    MCHC 32.9 31.5 - 35.7 g/dL    RDW 11.7 (L) 12.3 - 15.4 %    RDW-SD 38.2 37.0 - 54.0 fl    MPV 9.7 6.0 - 12.0 fL    Platelets 147 140 - 450 10*3/mm3    Neutrophil % 82.7 (H) 42.7 - 76.0 %    Lymphocyte % 6.3 (L) 19.6 - 45.3 %    Monocyte % 10.2 5.0 - 12.0 %    Eosinophil % 0.0 (L) 0.3 - 6.2 %    Basophil % 0.2 0.0 - 1.5 %    Neutrophils, Absolute 7.93 (H) 1.70 - 7.00 10*3/mm3    Lymphocytes, Absolute 0.60 (L) 0.70 - 3.10 10*3/mm3    Monocytes, Absolute 0.98 (H) 0.10 - 0.90 10*3/mm3    Eosinophils, Absolute 0.00 0.00 - 0.40 10*3/mm3    Basophils, Absolute 0.02 0.00 - 0.20 10*3/mm3         RADIOLOGY  XR Chest 1 View    Result Date: 3/21/2024  XR CHEST 1 VW-  Clinical: Fever  COMPARISON 12/24/2021  FINDINGS: Cardiac size within normal limits. No mediastinal or hilar abnormality. Lungs are clear. No effusion or edema seen.  CONCLUSION: No active disease of the chest  This report was finalized on 3/21/2024 3:54 PM by Dr. Reji Keen M.D on Workstation: [id number]         MEDICATIONS GIVEN IN ER  Medications   acetaminophen (TYLENOL) tablet 1,000 mg (1,000 mg Oral Not Given 3/21/24 1518)   ceFAZolin 1000 mg IVPB in 100 mL NS (MBP) (1,000 mg Intravenous New Bag 3/21/24 1633)   sepsis fluid NS 0.9 % bolus 1,944 mL (1,944 mL Intravenous New Bag 3/21/24 1457)   acetaminophen (TYLENOL) suppository 650 mg (650 mg Rectal Given 3/21/24 1518)         ORDERS PLACED DURING THIS VISIT:  Orders Placed This Encounter   Procedures    Blood Culture - Blood,    Blood Culture - Blood,    Respiratory Panel  PCR w/COVID-19(SARS-CoV-2) KEERTHI/BEAU/CRISTIANO/PAD/COR/[facility]GO In-House, NP Swab in UTM/VTM, 2 HR TAT - Swab, Nasopharynx    XR Chest 1 View    Comprehensive Metabolic Panel    Lactic Acid, Plasma    Procalcitonin    Urinalysis With Microscopic If Indicated (No Culture) - Urine, Clean Catch    CBC Auto Differential    STAT Lactic Acid, Reflex    [facility] (on-call MD unless specified) Details    POC Glucose Once    Inpatient Admission    CBC & Differential         OUTPATIENT MEDICATION MANAGEMENT:  Current Facility-Administered Medications Ordered in Epic   Medication Dose Route Frequency Provider Last Rate Last Admin    acetaminophen (TYLENOL) tablet 1,000 mg  1,000 mg Oral Once Osvaldo, Jeff DINH MD        ceFAZolin 1000 mg IVPB in 100 mL NS (MBP)  1,000 mg Intravenous Once Osvaldo, Jeff DINH MD   1,000 mg at 03/21/24 1639     Current Outpatient Medications Ordered in Epic   Medication Sig Dispense Refill    amitriptyline (ELAVIL) 25 MG tablet Take 3 tablets by mouth Every Night. 90 tablet 0    baclofen (LIORESAL) 10 MG tablet Take 1 tablet by mouth 2 (Two) Times a Day. 60 tablet 0    cetirizine (ZyrTEC) 10 MG tablet Take 1 tablet by mouth Daily As Needed for Allergies.      DULoxetine (CYMBALTA) 30 MG capsule Take 2 capsules by mouth Daily.      gabapentin (NEURONTIN) 300 MG capsule Take 1 capsule by mouth At Night As Needed (For pain).      glipiZIDE (GLUCOTROL) 10 MG tablet Take 1 tablet by mouth 2 (Two) Times a Day Before Meals.      hydroCHLOROthiazide 25 MG tablet Take 1 tablet by mouth Daily.      Insulin Glargine-Lixisenatide (Soliqua) 100-33 UNT-MCG/ML solution pen-injector injection Inject 50 Units under the skin into the appropriate area as directed Daily.      Insulin Lispro, 1 Unit Dial, (HumaLOG KwikPen) 100 UNIT/ML solution pen-injector Inject 5 Units under the skin into the appropriate area as directed 3 (Three) Times a Day With Meals 15 mL 0    Insulin Pen Needle (Pen Needles) 32G X 4 MM misc Inject 1 each under the  skin into the appropriate area as directed 4 (Four) Times a Day As Needed (for insulin injections). Formulary Compliance Approval 100 each 12    lisinopril (PRINIVIL,ZESTRIL) 10 MG tablet Take 2 tablets by mouth Daily.      meloxicam (MOBIC) 15 MG tablet Take 1 tablet by mouth Daily.      METFORMIN HCL PO Take 1,000 mg by mouth 2 (two) times a day         omeprazole (PriLOSEC) 40 MG capsule Take 1 capsule by mouth Daily.      rosuvastatin (CRESTOR) 10 MG tablet Take 1 tablet by mouth Daily.           PROCEDURES  Procedures            PROGRESS, DATA ANALYSIS, CONSULTS, AND MEDICAL DECISION MAKING  All labs have been independently interpreted by me.  All radiology studies have been reviewed by me. All EKG's have been independently viewed and interpreted by me.  Discussion below represents my analysis of pertinent findings related to patient's condition, differential diagnosis, treatment plan and final disposition.    Differential diagnosis includes but is not limited to underlying infection of unclear cause.  Consider skin infection including right toe ulceration.  Consider upper respiratory infection both viral and bacterial.  Consider intra-abdominal infection in a patient with nausea and vomiting.  Consider dehydration and electrolyte disturbance and possible DKA and a known diabetic with fever tachycardia and vomiting..      ED Course as of 03/21/24 1649   Thu Mar 21, 2024   1555 Urinalysis reviewed is unremarkable and not suggestive of acute infection.  There is noted greater than 1000 glucose. [DB]   1556 Chest x-ray independently interpreted by me shows no obvious acute disease. [DB]   1620 Viral panel negative for tested pathogens. [DB]   1620 Chemistries reviewed notable for elevated glucose of 322.  Bicarb is 24 which would go against DKA or significant acidosis. [DB]   1620 Procalcitonin elevated at 0.27 and elevated lactic acid 2.4 increases concern for possible underlying bacterial illness.  Blood  cultures are pending. [DB]   1621 As urinalysis and chest x-ray are also pretty benign I do not see clear evidence of cause of this high fever.  Certainly I would be worried about possible foot infection and could be osteomyelitis.  Will admit to the hospital for further evaluation and treatment of fever and complicated patient with unclear source. [DB]   1622  is now present at bedside and states that patient is much more coherent.  She was much more confused earlier today.    As I enter the room to reassess she is talking on the phone with heart rate just over 100.  O2 sats and blood pressure are benign. [DB]   1625 As I do not see other clear source, the most likely cause may well be from diabetic foot infection.  Will go ahead and cover with IV Kefzol pending further workup and evaluation. [DB]   1648 I discussed evaluation treatment of this patient with Dr. Ruddy Roche who admit on behalf of [facility]. [DB]      ED Course User Index  [DB] Jeff Riley MD             AS OF 16:49 EDT VITALS:    BP - (!) 158/105  HR - 104  TEMP - 99.8 °F (37.7 °C) (Tympanic)  O2 SATS - 96%    COMPLEXITY OF CARE  Complicated patient with multiple comorbidities presents with fever greater than 103 as well as vomiting and tachycardia.  She is also had altered mental status.    Initial focus of infection unclear with initial exam.  Will give 30/kg IV fluid bolus and initiate ED workup.      DIAGNOSIS  Final diagnoses:   Acute metabolic encephalopathy   Diabetic foot infection         DISPOSITION  ED Disposition       ED Disposition   Decision to Admit    Condition   --    Comment   Level of Care: Telemetry [5]   Diagnosis: Acute metabolic encephalopathy [[id number]]   Admitting Physician: RAYMUNDO ROCHE [[id number]]   Attending Physician: RAYMUNDO ROCHE [[id number]]   Certification: I Certify That Inpatient Hospital Services Are Medically Necessary For Greater Than 2 Midnights                  Please note that portions of this  document were completed with a voice recognition program.    Note Disclaimer: At [facility], we believe that sharing information builds trust and better relationships. You are receiving this note because you recently visited [facility]. It is possible you will see health information before a provider has talked with you about it. This kind of information can be easy to misunderstand. To help you fully understand what it means for your health, we urge you to discuss this note with your provider.         Jeff Riley MD  03/21/24 [id number]

## 2024-03-21 NOTE — PROGRESS NOTES
Clinical Pharmacy Services: Medication History    Roxanna Beck is a 58 y.o. female presenting to [facility] for   Chief Complaint   Patient presents with    Altered Mental Status       She  has a past medical history of Arthritis, Depression, Diabetes mellitus, Dizzy, Fibromyalgia, GERD (gastroesophageal reflux disease), History of kidney stones, HNP (herniated nucleus pulposus), Hyperlipidemia, Hypertension, and Wears glasses.    Allergies as of 03/21/2024 - Reviewed 03/21/2024   Allergen Reaction Noted    Codeine Nausea And Vomiting 06/29/2016    Percocet [oxycodone-acetaminophen] Nausea And Vomiting 12/11/2016    Adhesive tape Rash 12/29/2016    Toradol [ketorolac tromethamine] Other (See Comments) 12/11/2016       Medication information was obtained from: Patient   Pharmacy and Phone Number:     Prior to Admission Medications       Prescriptions Last Dose Informant Patient Reported? Taking?    amitriptyline (ELAVIL) 25 MG tablet  Self No Yes    Take 3 tablets by mouth Every Night.    Patient taking differently:  Take 2 tablets by mouth Every Night.    baclofen (LIORESAL) 10 MG tablet  Self No Yes    Take 1 tablet by mouth 2 (Two) Times a Day.    cephalexin (KEFLEX) 500 MG capsule  Self Yes Yes    Take 1 capsule by mouth 2 (Two) Times a Day.    cetirizine (ZyrTEC) 10 MG tablet  Self Yes Yes    Take 1 tablet by mouth Daily.    DULoxetine (CYMBALTA) 60 MG capsule  Self Yes Yes    Take 1 capsule by mouth Daily.    gabapentin (NEURONTIN) 300 MG capsule  Self Yes Yes    Take 1 capsule by mouth At Night As Needed (For pain).    glipiZIDE (GLUCOTROL) 10 MG tablet  Self Yes Yes    Take 1 tablet by mouth 2 (Two) Times a Day Before Meals.    hydroCHLOROthiazide 25 MG tablet  Self Yes Yes    Take 1 tablet by mouth Daily.    Insulin Glargine (Lantus SoloStar) 100 UNIT/ML injection pen  Self Yes Yes    Inject 50 Units under the skin into the appropriate area as directed Daily.    Insulin Lispro, 1 Unit Dial,  (HumaLOG KwikPen) 100 UNIT/ML solution pen-injector  Self No Yes    Inject 5 Units under the skin into the appropriate area as directed 3 (Three) Times a Day With Meals    Patient taking differently:  Inject 5 Units under the skin into the appropriate area as directed 3 (Three) Times a Day Before Meals.    lisinopril (PRINIVIL,ZESTRIL) 20 MG tablet  Self Yes Yes    Take 1 tablet by mouth Daily.    meloxicam (MOBIC) 15 MG tablet  Self Yes Yes    Take 1 tablet by mouth Daily.    metFORMIN (GLUCOPHAGE) 1000 MG tablet  Self Yes Yes    Take 1 tablet by mouth 2 (Two) Times a Day.    omeprazole (PriLOSEC) 40 MG capsule  Self Yes Yes    Take 1 capsule by mouth Daily.    PARoxetine (PAXIL) 20 MG tablet  Self Yes Yes    Take 1 tablet by mouth Every Morning.    pioglitazone (ACTOS) 30 MG tablet  Self Yes Yes    Take 1 tablet by mouth Daily.    rosuvastatin (CRESTOR) 10 MG tablet  Self Yes Yes    Take 1 tablet by mouth Daily.    SUMAtriptan (IMITREX) 50 MG tablet  Self Yes Yes    Take 1 tablet by mouth Every 2 (Two) Hours As Needed for Migraine. Take one tablet at onset of headache. May repeat dose one time in 2 hours if headache not relieved.    Insulin Glargine-Lixisenatide (Soliqua) 100-33 UNT-MCG/ML solution pen-injector injection  Pharmacy Yes No    Inject 50 Units under the skin into the appropriate area as directed Daily.    Insulin Pen Needle (Pen Needles) 32G X 4 MM misc   No No    Inject 1 each under the skin into the appropriate area as directed 4 (Four) Times a Day As Needed (for insulin injections). Formulary Compliance Approval              Medication notes:     This medication list is complete to the best of my knowledge as of 3/21/2024    Please call if questions.    Elan So  Medication History Technician   [phone]    3/21/2024 17:43 EDT

## 2024-03-21 NOTE — ED PROVIDER NOTES
" EMERGENCY DEPARTMENT ENCOUNTER  Room Number:    PCP: Donato Vilchis DO  Independent Historians: {Historian:75147::\"Patient\"}      HPI:  Chief Complaint: had concerns including Altered Mental Status. ***    A complete HPI/ROS/PMH/PSH/SH/FH are unobtainable due to: {EM_Unobtainable History (Optional):85064::\"None\"}    Chronic or social conditions impacting patient care (Social Determinants of Health): {EM_SDOH (Optional):13833::\"None\"}      Context: Roxanna Beck is a 58 y.o. female with a medical history of *** who presents to the ED c/o acute ***      Review of prior external notes (non-ED) -and- Review of prior external test results outside of this encounter: {EM_OldRecords:00508::\"***\"}    Prescription drug monitoring program review:     {EM_Kasper (Optional):47294::\"N/A\"}    PAST MEDICAL HISTORY  Active Ambulatory Problems     Diagnosis Date Noted    Lumbar disc herniation 12/15/2016    Migraine 2021    Hyperglycemia 2024    Diabetic ulcer of right great toe 2024    Diabetic peripheral neuropathy 2020    Generalized anxiety disorder 2020    Insulin dependent type 2 diabetes mellitus 2021    Migraine headache 2020    Hypertension 2021    Hyperglycemia due to diabetes mellitus 2024     Resolved Ambulatory Problems     Diagnosis Date Noted    Lactic acidosis 2024     Past Medical History:   Diagnosis Date    Arthritis     Depression     Diabetes mellitus     Dizzy     Fibromyalgia     GERD (gastroesophageal reflux disease)     History of kidney stones     HNP (herniated nucleus pulposus)     Hyperlipidemia     Wears glasses          PAST SURGICAL HISTORY  Past Surgical History:   Procedure Laterality Date    BACK SURGERY      L4-5 Discectomy    CARPAL TUNNEL RELEASE      left     SECTION      CHOLECYSTECTOMY      COLONOSCOPY      ENDOSCOPY      HYSTERECTOMY      KIDNEY STONE SURGERY      LUMBAR DISCECTOMY N/A 12/15/2016    Procedure: " LUMBAR DISCECTOMY L3-4;  Surgeon: Sandro Cuenca MD;  Location: [facility] OR;  Service:     OOPHORECTOMY      TONSILLECTOMY      WISDOM TOOTH EXTRACTION           FAMILY HISTORY  Family History   Problem Relation Age of Onset    Lung cancer Mother     Cancer Mother     Heart attack Father     Heart disease Father     Diabetes Sister     Hypertension Sister     Breast cancer Sister 70    Diabetes Brother     Hypertension Brother     Heart disease Paternal Grandmother     Heart disease Paternal Grandfather     Breast cancer Cousin         early 50's    Breast cancer Maternal Aunt         50's    Breast cancer Maternal Aunt         50's    Breast cancer Maternal Aunt         50's    Breast cancer Maternal Aunt         50's    Breast cancer Maternal Aunt         50's    Endometrial cancer Neg Hx     Ovarian cancer Neg Hx          SOCIAL HISTORY  Social History     Socioeconomic History    Marital status: Single    Number of children: 1    Years of education: High School   Tobacco Use    Smoking status: Never    Smokeless tobacco: Never   Vaping Use    Vaping status: Never Used   Substance and Sexual Activity    Alcohol use: No    Drug use: No    Sexual activity: Defer         ALLERGIES  Codeine, Percocet [oxycodone-acetaminophen], Adhesive tape, and Toradol [ketorolac tromethamine]      REVIEW OF SYSTEMS  Review of Systems  Included in HPI  All systems reviewed and negative except for those discussed in HPI.      PHYSICAL EXAM    I have reviewed the triage vital signs and nursing notes.    ED Triage Vitals   Temp Heart Rate Resp BP SpO2   03/21/24 1459 03/21/24 1501 03/21/24 1459 03/21/24 1504 03/21/24 1501   (!) 101.2 °F (38.4 °C) 112 20 155/89 95 %      Temp src Heart Rate Source Patient Position BP Location FiO2 (%)   03/21/24 1459 -- -- -- --   Tympanic           Physical Exam  GENERAL: ***alert, no acute distress  SKIN: Warm, dry  HENT: Normocephalic, atraumatic  EYES: no scleral icterus  CV: regular rhythm, regular  rate  RESPIRATORY: normal effort, lungs clear  ABDOMEN: soft, nontender, nondistended  MUSCULOSKELETAL: no deformity  NEURO: alert, moves all extremities, follows commands        {EM_PPE (Optional):21391}    LAB RESULTS  Recent Results (from the past 24 hour(s))   POC Glucose Once    Collection Time: 03/21/24  2:47 PM    Specimen: Blood   Result Value Ref Range    Glucose 334 (H) 70 - 130 mg/dL   Respiratory Panel PCR w/COVID-19(SARS-CoV-2) KEERTHI/BEAU/CRISTIANO/PAD/COR/HUGO In-House, NP Swab in UTM/VTM, 2 HR TAT - Swab, Nasopharynx    Collection Time: 03/21/24  2:56 PM    Specimen: Nasopharynx; Swab   Result Value Ref Range    ADENOVIRUS, PCR Not Detected Not Detected    Coronavirus 229E Not Detected Not Detected    Coronavirus HKU1 Not Detected Not Detected    Coronavirus NL63 Not Detected Not Detected    Coronavirus OC43 Not Detected Not Detected    COVID19 Not Detected Not Detected - Ref. Range    Human Metapneumovirus Not Detected Not Detected    Human Rhinovirus/Enterovirus Not Detected Not Detected    Influenza A PCR Not Detected Not Detected    Influenza B PCR Not Detected Not Detected    Parainfluenza Virus 1 Not Detected Not Detected    Parainfluenza Virus 2 Not Detected Not Detected    Parainfluenza Virus 3 Not Detected Not Detected    Parainfluenza Virus 4 Not Detected Not Detected    RSV, PCR Not Detected Not Detected    Bordetella pertussis pcr Not Detected Not Detected    Bordetella parapertussis PCR Not Detected Not Detected    Chlamydophila pneumoniae PCR Not Detected Not Detected    Mycoplasma pneumo by PCR Not Detected Not Detected   Urinalysis With Microscopic If Indicated (No Culture) - Straight Cath    Collection Time: 03/21/24  3:22 PM    Specimen: Straight Cath; Urine   Result Value Ref Range    Color, UA Yellow Yellow, Straw    Appearance, UA Clear Clear    pH, UA 6.5 5.0 - 8.0    Specific Gravity, UA >=1.030 1.005 - 1.030    Glucose, UA >=1000 mg/dL (3+) (A) Negative    Ketones, UA Trace (A) Negative     Bilirubin, UA Negative Negative    Blood, UA Negative Negative    Protein, UA Negative Negative    Leuk Esterase, UA Negative Negative    Nitrite, UA Negative Negative    Urobilinogen, UA 0.2 E.U./dL 0.2 - 1.0 E.U./dL   Comprehensive Metabolic Panel    Collection Time: 03/21/24  3:27 PM    Specimen: Blood   Result Value Ref Range    Glucose 322 (H) 65 - 99 mg/dL    BUN 22 (H) 6 - 20 mg/dL    Creatinine 0.93 0.57 - 1.00 mg/dL    Sodium 132 (L) 136 - 145 mmol/L    Potassium 4.8 3.5 - 5.2 mmol/L    Chloride 95 (L) 98 - 107 mmol/L    CO2 23.8 22.0 - 29.0 mmol/L    Calcium 8.6 8.6 - 10.5 mg/dL    Total Protein 6.6 6.0 - 8.5 g/dL    Albumin 3.5 3.5 - 5.2 g/dL    ALT (SGPT) 35 (H) 1 - 33 U/L    AST (SGOT) 27 1 - 32 U/L    Alkaline Phosphatase 113 39 - 117 U/L    Total Bilirubin 0.5 0.0 - 1.2 mg/dL    Globulin 3.1 gm/dL    A/G Ratio 1.1 g/dL    BUN/Creatinine Ratio 23.7 7.0 - 25.0    Anion Gap 13.2 5.0 - 15.0 mmol/L    eGFR 70.9 >60.0 mL/min/1.73   Lactic Acid, Plasma    Collection Time: 03/21/24  3:27 PM    Specimen: Blood   Result Value Ref Range    Lactate 2.4 (C) 0.5 - 2.0 mmol/L   Procalcitonin    Collection Time: 03/21/24  3:27 PM    Specimen: Blood   Result Value Ref Range    Procalcitonin 0.27 (H) 0.00 - 0.25 ng/mL   CBC Auto Differential    Collection Time: 03/21/24  3:27 PM    Specimen: Blood   Result Value Ref Range    WBC 9.59 3.40 - 10.80 10*3/mm3    RBC 4.15 3.77 - 5.28 10*6/mm3    Hemoglobin 12.2 12.0 - 15.9 g/dL    Hematocrit 37.1 34.0 - 46.6 %    MCV 89.4 79.0 - 97.0 fL    MCH 29.4 26.6 - 33.0 pg    MCHC 32.9 31.5 - 35.7 g/dL    RDW 11.7 (L) 12.3 - 15.4 %    RDW-SD 38.2 37.0 - 54.0 fl    MPV 9.7 6.0 - 12.0 fL    Platelets 147 140 - 450 10*3/mm3    Neutrophil % 82.7 (H) 42.7 - 76.0 %    Lymphocyte % 6.3 (L) 19.6 - 45.3 %    Monocyte % 10.2 5.0 - 12.0 %    Eosinophil % 0.0 (L) 0.3 - 6.2 %    Basophil % 0.2 0.0 - 1.5 %    Neutrophils, Absolute 7.93 (H) 1.70 - 7.00 10*3/mm3    Lymphocytes, Absolute 0.60 (L)  0.70 - 3.10 10*3/mm3    Monocytes, Absolute 0.98 (H) 0.10 - 0.90 10*3/mm3    Eosinophils, Absolute 0.00 0.00 - 0.40 10*3/mm3    Basophils, Absolute 0.02 0.00 - 0.20 10*3/mm3         RADIOLOGY  XR Chest 1 View    Result Date: 3/21/2024  XR CHEST 1 VW-  Clinical: Fever  COMPARISON 12/24/2021  FINDINGS: Cardiac size within normal limits. No mediastinal or hilar abnormality. Lungs are clear. No effusion or edema seen.  CONCLUSION: No active disease of the chest  This report was finalized on 3/21/2024 3:54 PM by Dr. Reji Keen M.D on Workstation: [id number]         MEDICATIONS GIVEN IN ER  Medications   acetaminophen (TYLENOL) tablet 1,000 mg (1,000 mg Oral Not Given 3/21/24 1518)   acetaminophen (TYLENOL) tablet 650 mg (has no administration in time range)   ondansetron ODT (ZOFRAN-ODT) disintegrating tablet 4 mg (has no administration in time range)     Or   ondansetron (ZOFRAN) injection 4 mg (has no administration in time range)   melatonin tablet 3 mg (has no administration in time range)   sennosides-docusate (PERICOLACE) 8.6-50 MG per tablet 2 tablet (has no administration in time range)     And   polyethylene glycol (MIRALAX) packet 17 g (has no administration in time range)     And   bisacodyl (DULCOLAX) EC tablet 5 mg (has no administration in time range)     And   bisacodyl (DULCOLAX) suppository 10 mg (has no administration in time range)   sepsis fluid NS 0.9 % bolus 1,944 mL (1,944 mL Intravenous New Bag 3/21/24 1457)   acetaminophen (TYLENOL) suppository 650 mg (650 mg Rectal Given 3/21/24 1518)   ceFAZolin 1000 mg IVPB in 100 mL NS (MBP) (1,000 mg Intravenous New Bag 3/21/24 1633)         ORDERS PLACED DURING THIS VISIT:  Orders Placed This Encounter   Procedures    Blood Culture - Blood,    Blood Culture - Blood,    Respiratory Panel PCR w/COVID-19(SARS-CoV-2) KEERTHI/BEAU/CRISTIANO/PAD/COR/HUGO In-House, NP Swab in [facility]/VTM, 2 HR TAT - Swab, Nasopharynx    XR Chest 1 View    Comprehensive Metabolic Panel     Lactic Acid, Plasma    Procalcitonin    Urinalysis With Microscopic If Indicated (No Culture) - Urine, Clean Catch    CBC Auto Differential    STAT Lactic Acid, Reflex    Basic Metabolic Panel    CBC (No Diff)    Diet: Cardiac; Healthy Heart (2-3 Na+); Fluid Consistency: Thin (IDDSI 0)    Vital Signs    Up with assistance    Daily Weights    Strict Intake & Output    Oral Care    Place Sequential Compression Device    Maintain Sequential Compression Device    Code Status and Medical Interventions:    [facility] (on-call MD unless specified) Details    POC Glucose Once    Inpatient Admission    CBC & Differential         OUTPATIENT MEDICATION MANAGEMENT:  Current Facility-Administered Medications Ordered in Epic   Medication Dose Route Frequency Provider Last Rate Last Admin    acetaminophen (TYLENOL) tablet 1,000 mg  1,000 mg Oral Once BrooklynJeff diaz MD        acetaminophen (TYLENOL) tablet 650 mg  650 mg Oral Q4H PRN Stingkelsey, Victoria Law MD        sennosides-docusate (PERICOLACE) 8.6-50 MG per tablet 2 tablet  2 tablet Oral BID PRN Stingkelsey, Victoria aLw MD        And    polyethylene glycol (MIRALAX) packet 17 g  17 g Oral Daily PRN Melchor, Victoria Law MD        And    bisacodyl (DULCOLAX) EC tablet 5 mg  5 mg Oral Daily PRN Melchor, Victoria Law MD        And    bisacodyl (DULCOLAX) suppository 10 mg  10 mg Rectal Daily PRN Stingkelsey, Victoria Law MD        melatonin tablet 3 mg  3 mg Oral Nightly PRN Stingkelsey, Victoria Law MD        ondansetron ODT (ZOFRAN-ODT) disintegrating tablet 4 mg  4 mg Oral Q6H PRN Victoria Roche MD        Or    ondansetron (ZOFRAN) injection 4 mg  4 mg Intravenous Q6H PRN Melchor, Victoria Law MD         Current Outpatient Medications Ordered in Epic   Medication Sig Dispense Refill    amitriptyline (ELAVIL) 25 MG tablet Take 3 tablets by mouth Every Night. 90 tablet 0    baclofen (LIORESAL) 10 MG tablet Take 1 tablet by mouth 2 (Two) Times a Day. 60 tablet 0    cetirizine  "(ZyrTEC) 10 MG tablet Take 1 tablet by mouth Daily As Needed for Allergies.      DULoxetine (CYMBALTA) 30 MG capsule Take 2 capsules by mouth Daily.      gabapentin (NEURONTIN) 300 MG capsule Take 1 capsule by mouth At Night As Needed (For pain).      glipiZIDE (GLUCOTROL) 10 MG tablet Take 1 tablet by mouth 2 (Two) Times a Day Before Meals.      hydroCHLOROthiazide 25 MG tablet Take 1 tablet by mouth Daily.      Insulin Glargine-Lixisenatide (Soliqua) 100-33 UNT-MCG/ML solution pen-injector injection Inject 50 Units under the skin into the appropriate area as directed Daily.      Insulin Lispro, 1 Unit Dial, (HumaLOG KwikPen) 100 UNIT/ML solution pen-injector Inject 5 Units under the skin into the appropriate area as directed 3 (Three) Times a Day With Meals 15 mL 0    Insulin Pen Needle (Pen Needles) 32G X 4 MM misc Inject 1 each under the skin into the appropriate area as directed 4 (Four) Times a Day As Needed (for insulin injections). Formulary Compliance Approval 100 each 12    lisinopril (PRINIVIL,ZESTRIL) 10 MG tablet Take 2 tablets by mouth Daily.      meloxicam (MOBIC) 15 MG tablet Take 1 tablet by mouth Daily.      METFORMIN HCL PO Take 1,000 mg by mouth 2 (two) times a day         omeprazole (PriLOSEC) 40 MG capsule Take 1 capsule by mouth Daily.      rosuvastatin (CRESTOR) 10 MG tablet Take 1 tablet by mouth Daily.           PROCEDURES  Procedures      Critical care provider statement:    Critical care time (minutes): {EM_CCTime:72357::\"***\"}.   Critical care time was exclusive of:  Separately billable procedures and treating other patients   Critical care was necessary to treat or prevent imminent or life-threatening deterioration of the following conditions:  {EM_CCIndications:93431}   Critical care was time spent personally by me on the following activities:  Development of treatment plan with patient or surrogate, discussions with consultants, evaluation of patient's response to treatment, " examination of patient, obtaining history from patient or surrogate, ordering and performing treatments and interventions, ordering and review of laboratory studies, ordering and review of radiographic studies, pulse oximetry, re-evaluation of patient's condition and review of old charts. Critical Care indicators: {EM_CCIndicators (Optional):37765} {EM_CCAggressiveManagement (Optional):13141}      PROGRESS, DATA ANALYSIS, CONSULTS, AND MEDICAL DECISION MAKING  All labs have been independently interpreted by me.  All radiology studies have been reviewed by me. All EKG's have been independently viewed and interpreted by me.  Discussion below represents my analysis of pertinent findings related to patient's condition, differential diagnosis, treatment plan and final disposition.    Differential diagnosis includes but is not limited to ***.    Clinical Scores:                   ED Course as of 03/21/24 1704   Thu Mar 21, 2024   1555 Urinalysis reviewed is unremarkable and not suggestive of acute infection.  There is noted greater than 1000 glucose. [DB]   1556 Chest x-ray independently interpreted by me shows no obvious acute disease. [DB]   1620 Viral panel negative for tested pathogens. [DB]   1620 Chemistries reviewed notable for elevated glucose of 322.  Bicarb is 24 which would go against DKA or significant acidosis. [DB]   1620 Procalcitonin elevated at 0.27 and elevated lactic acid 2.4 increases concern for possible underlying bacterial illness.  Blood cultures are pending. [DB]   1621 As urinalysis and chest x-ray are also pretty benign I do not see clear evidence of cause of this high fever.  Certainly I would be worried about possible foot infection and could be osteomyelitis.  Will admit to the hospital for further evaluation and treatment of fever and complicated patient with unclear source. [DB]   1622  is now present at bedside and states that patient is much more coherent.  She was much more  confused earlier today.    As I enter the room to reassess she is talking on the phone with heart rate just over 100.  O2 sats and blood pressure are benign. [DB]   1625 As I do not see other clear source, the most likely cause may well be from diabetic foot infection.  Will go ahead and cover with IV Kefzol pending further workup and evaluation. [DB]   1648 I discussed evaluation treatment of this patient with Dr. Ruddy Roche who admit on behalf of [facility]. [DB]      ED Course User Index  [DB] Jeff Riley MD             AS OF 17:04 EDT VITALS:    BP - (!) 158/105  HR - 104  TEMP - 99.8 °F (37.7 °C) (Tympanic)  O2 SATS - 96%    COMPLEXITY OF CARE  {Admission Statement:72647}      DIAGNOSIS  Final diagnoses:   Acute metabolic encephalopathy   Diabetic foot infection         DISPOSITION  ED Disposition       ED Disposition   Decision to Admit    Condition   --    Comment   Level of Care: Telemetry [5]   Diagnosis: Acute metabolic encephalopathy [8076669]   Admitting Physician: RAYMUNDO ROCHE [[id number]]   Attending Physician: RAYMUNDO ROCHE [[id number]]   Certification: I Certify That Inpatient Hospital Services Are Medically Necessary For Greater Than 2 Midnights                  Please note that portions of this document were completed with a voice recognition program.    Note Disclaimer: At [facility], we believe that sharing information builds trust and better relationships. You are receiving this note because you recently visited [facility]. It is possible you will see health information before a provider has talked with you about it. This kind of information can be easy to misunderstand. To help you fully understand what it means for your health, we urge you to discuss this note with your provider.

## 2024-03-21 NOTE — ED NOTES
Pt arrives via EMS from home; called for AMS, normally Aox4, Aox1 at time of contact, would answer some questions, or answer wrong. Fever of 103.2 orally, tachypnea, episodes of incontinence 2-3 times, unable to walk since last night.  notified change in behavior at 2130 last night. Son lives at home and has recently had the flu. Has not taken any regular medication. One episode of vomiting once arrived.

## 2024-03-22 ENCOUNTER — APPOINTMENT (OUTPATIENT)
Dept: CARDIOLOGY | Facility: HOSPITAL | Age: 59
End: 2024-03-22
Payer: MEDICAID

## 2024-03-22 PROBLEM — R50.9 FEVER: Status: ACTIVE | Noted: 2024-03-22

## 2024-03-22 PROBLEM — E87.1 HYPONATREMIA: Status: ACTIVE | Noted: 2024-03-22

## 2024-03-22 PROBLEM — E78.2 MIXED HYPERLIPIDEMIA: Status: ACTIVE | Noted: 2024-03-22

## 2024-03-22 PROBLEM — R11.2 NAUSEA & VOMITING: Status: ACTIVE | Noted: 2024-03-22

## 2024-03-22 PROBLEM — A41.9 SEPSIS: Status: ACTIVE | Noted: 2024-03-22

## 2024-03-22 PROBLEM — K21.9 GERD (GASTROESOPHAGEAL REFLUX DISEASE): Status: ACTIVE | Noted: 2024-03-22

## 2024-03-22 LAB
ANION GAP SERPL CALCULATED.3IONS-SCNC: 14.6 MMOL/L (ref 5–15)
BH CV LOWER ARTERIAL LEFT ABI RATIO: 1.15
BH CV LOWER ARTERIAL LEFT DORSALIS PEDIS SYS MAX: 137
BH CV LOWER ARTERIAL LEFT GREAT TOE SYS MAX: 57
BH CV LOWER ARTERIAL LEFT POST TIBIAL SYS MAX: 132
BH CV LOWER ARTERIAL LEFT TBI RATIO: 0.48
BH CV LOWER ARTERIAL RIGHT ABI RATIO: 1.22
BH CV LOWER ARTERIAL RIGHT DORSALIS PEDIS SYS MAX: 123
BH CV LOWER ARTERIAL RIGHT GREAT TOE SYS MAX: 117
BH CV LOWER ARTERIAL RIGHT POST TIBIAL SYS MAX: 145
BH CV LOWER ARTERIAL RIGHT TBI RATIO: 0.98
BUN SERPL-MCNC: 19 MG/DL (ref 6–20)
BUN/CREAT SERPL: 26 (ref 7–25)
CALCIUM SPEC-SCNC: 8.5 MG/DL (ref 8.6–10.5)
CHLORIDE SERPL-SCNC: 98 MMOL/L (ref 98–107)
CO2 SERPL-SCNC: 21.4 MMOL/L (ref 22–29)
CREAT SERPL-MCNC: 0.73 MG/DL (ref 0.57–1)
CRP SERPL-MCNC: 13.59 MG/DL (ref 0–0.5)
DEPRECATED RDW RBC AUTO: 39.8 FL (ref 37–54)
EGFRCR SERPLBLD CKD-EPI 2021: 94.9 ML/MIN/1.73
ERYTHROCYTE [DISTWIDTH] IN BLOOD BY AUTOMATED COUNT: 11.8 % (ref 12.3–15.4)
ERYTHROCYTE [SEDIMENTATION RATE] IN BLOOD: 28 MM/HR (ref 0–30)
GLUCOSE BLDC GLUCOMTR-MCNC: 128 MG/DL (ref 70–130)
GLUCOSE BLDC GLUCOMTR-MCNC: 158 MG/DL (ref 70–130)
GLUCOSE BLDC GLUCOMTR-MCNC: 272 MG/DL (ref 70–130)
GLUCOSE BLDC GLUCOMTR-MCNC: 293 MG/DL (ref 70–130)
GLUCOSE SERPL-MCNC: 181 MG/DL (ref 65–99)
HBA1C MFR BLD: 11.2 % (ref 4.8–5.6)
HCT VFR BLD AUTO: 36.5 % (ref 34–46.6)
HGB BLD-MCNC: 12 G/DL (ref 12–15.9)
MCH RBC QN AUTO: 30.2 PG (ref 26.6–33)
MCHC RBC AUTO-ENTMCNC: 32.9 G/DL (ref 31.5–35.7)
MCV RBC AUTO: 91.9 FL (ref 79–97)
PLATELET # BLD AUTO: 140 10*3/MM3 (ref 140–450)
PMV BLD AUTO: 9.8 FL (ref 6–12)
POTASSIUM SERPL-SCNC: 3.8 MMOL/L (ref 3.5–5.2)
RBC # BLD AUTO: 3.97 10*6/MM3 (ref 3.77–5.28)
SODIUM SERPL-SCNC: 134 MMOL/L (ref 136–145)
UPPER ARTERIAL LEFT ARM BRACHIAL SYS MAX: 117
UPPER ARTERIAL RIGHT ARM BRACHIAL SYS MAX: 119
WBC NRBC COR # BLD AUTO: 7.19 10*3/MM3 (ref 3.4–10.8)

## 2024-03-22 PROCEDURE — 97530 THERAPEUTIC ACTIVITIES: CPT

## 2024-03-22 PROCEDURE — 86140 C-REACTIVE PROTEIN: CPT | Performed by: PODIATRIST

## 2024-03-22 PROCEDURE — 97162 PT EVAL MOD COMPLEX 30 MIN: CPT

## 2024-03-22 PROCEDURE — 83036 HEMOGLOBIN GLYCOSYLATED A1C: CPT | Performed by: PODIATRIST

## 2024-03-22 PROCEDURE — 85652 RBC SED RATE AUTOMATED: CPT | Performed by: PODIATRIST

## 2024-03-22 PROCEDURE — 85027 COMPLETE CBC AUTOMATED: CPT | Performed by: INTERNAL MEDICINE

## 2024-03-22 PROCEDURE — 82948 REAGENT STRIP/BLOOD GLUCOSE: CPT

## 2024-03-22 PROCEDURE — 63710000001 INSULIN GLARGINE PER 5 UNITS: Performed by: INTERNAL MEDICINE

## 2024-03-22 PROCEDURE — 97110 THERAPEUTIC EXERCISES: CPT

## 2024-03-22 PROCEDURE — 25010000002 VANCOMYCIN 10 G RECONSTITUTED SOLUTION: Performed by: INTERNAL MEDICINE

## 2024-03-22 PROCEDURE — 25810000003 SODIUM CHLORIDE 0.9 % SOLUTION: Performed by: INTERNAL MEDICINE

## 2024-03-22 PROCEDURE — 93922 UPR/L XTREMITY ART 2 LEVELS: CPT

## 2024-03-22 PROCEDURE — 99223 1ST HOSP IP/OBS HIGH 75: CPT | Performed by: INTERNAL MEDICINE

## 2024-03-22 PROCEDURE — 93922 UPR/L XTREMITY ART 2 LEVELS: CPT | Performed by: SURGERY

## 2024-03-22 PROCEDURE — 25010000002 CEFTRIAXONE PER 250 MG: Performed by: INTERNAL MEDICINE

## 2024-03-22 PROCEDURE — 63710000001 INSULIN LISPRO (HUMAN) PER 5 UNITS: Performed by: INTERNAL MEDICINE

## 2024-03-22 PROCEDURE — 80048 BASIC METABOLIC PNL TOTAL CA: CPT | Performed by: INTERNAL MEDICINE

## 2024-03-22 PROCEDURE — 25010000002 ONDANSETRON PER 1 MG: Performed by: INTERNAL MEDICINE

## 2024-03-22 RX ORDER — HYDROCODONE BITARTRATE AND ACETAMINOPHEN 5; 325 MG/1; MG/1
1 TABLET ORAL EVERY 6 HOURS PRN
Status: DISCONTINUED | OUTPATIENT
Start: 2024-03-22 | End: 2024-03-26 | Stop reason: HOSPADM

## 2024-03-22 RX ORDER — GABAPENTIN 300 MG/1
300 CAPSULE ORAL NIGHTLY
Status: DISCONTINUED | OUTPATIENT
Start: 2024-03-22 | End: 2024-03-26 | Stop reason: HOSPADM

## 2024-03-22 RX ADMIN — INSULIN LISPRO 5 UNITS: 100 INJECTION, SOLUTION INTRAVENOUS; SUBCUTANEOUS at 08:24

## 2024-03-22 RX ADMIN — INSULIN LISPRO 4 UNITS: 100 INJECTION, SOLUTION INTRAVENOUS; SUBCUTANEOUS at 12:48

## 2024-03-22 RX ADMIN — INSULIN GLARGINE 50 UNITS: 100 INJECTION, SOLUTION SUBCUTANEOUS at 08:26

## 2024-03-22 RX ADMIN — ACETAMINOPHEN 325MG 650 MG: 325 TABLET ORAL at 07:58

## 2024-03-22 RX ADMIN — INSULIN LISPRO 4 UNITS: 100 INJECTION, SOLUTION INTRAVENOUS; SUBCUTANEOUS at 17:03

## 2024-03-22 RX ADMIN — VANCOMYCIN HYDROCHLORIDE 1750 MG: 10 INJECTION, POWDER, LYOPHILIZED, FOR SOLUTION INTRAVENOUS at 12:45

## 2024-03-22 RX ADMIN — CEFTRIAXONE 2000 MG: 2 INJECTION, POWDER, FOR SOLUTION INTRAMUSCULAR; INTRAVENOUS at 08:28

## 2024-03-22 RX ADMIN — SODIUM CHLORIDE 75 ML/HR: 9 INJECTION, SOLUTION INTRAVENOUS at 16:16

## 2024-03-22 RX ADMIN — PANTOPRAZOLE SODIUM 40 MG: 40 TABLET, DELAYED RELEASE ORAL at 06:13

## 2024-03-22 RX ADMIN — GABAPENTIN 300 MG: 300 CAPSULE ORAL at 21:31

## 2024-03-22 RX ADMIN — ONDANSETRON 4 MG: 2 INJECTION INTRAMUSCULAR; INTRAVENOUS at 00:23

## 2024-03-22 RX ADMIN — LISINOPRIL 20 MG: 20 TABLET ORAL at 08:23

## 2024-03-22 RX ADMIN — HYDROCODONE BITARTRATE AND ACETAMINOPHEN 1 TABLET: 5; 325 TABLET ORAL at 17:23

## 2024-03-22 RX ADMIN — ACETAMINOPHEN 325MG 650 MG: 325 TABLET ORAL at 17:01

## 2024-03-22 RX ADMIN — DULOXETINE HYDROCHLORIDE 60 MG: 60 CAPSULE, DELAYED RELEASE ORAL at 08:22

## 2024-03-22 RX ADMIN — ROSUVASTATIN CALCIUM 10 MG: 10 TABLET, FILM COATED ORAL at 08:22

## 2024-03-22 RX ADMIN — INSULIN LISPRO 5 UNITS: 100 INJECTION, SOLUTION INTRAVENOUS; SUBCUTANEOUS at 17:03

## 2024-03-22 RX ADMIN — INSULIN LISPRO 5 UNITS: 100 INJECTION, SOLUTION INTRAVENOUS; SUBCUTANEOUS at 12:48

## 2024-03-22 NOTE — CONSULTS
Podiatry Consult Note      Patient: Roxanna Beck Admit Date: 2024    Age: 58 y.o.   PCP: Donato Vilchis DO    MRN: [id number]  Room: S413/1        Subjective     Chief Complaint     Chief Complaint   Patient presents with    Altered Mental Status        HPI     This is a 57yo F who is established with myself who presents with right hallux infection. Patient was at my office on 3/19/2024 for evaluation of her diabetic foot ulcer. Xrays were taken and did not show any cortical erosion, however the ulcer was probing to capsule/bone. I placed the patient on PO keflex. Patient also informed me that her A1c has been very high. She is suppose to be taking 50units Lantus, however her insurance will not cover this amount for another week, so she has been taking 20units daily.     Over the last day, the patient has become disoriented. She came to  ED and was admitted. She has been receiving IV abx.    Past Medical History     Past Medical History:   Diagnosis Date    Arthritis     Depression     Diabetes mellitus     checks sugar once per day     Dizzy     Fibromyalgia     GERD (gastroesophageal reflux disease)     History of kidney stones     HNP (herniated nucleus pulposus)     Hyperlipidemia     Hypertension     Nausea & vomiting 3/22/2024    Wears glasses     readers        Past Surgical History:   Procedure Laterality Date    BACK SURGERY      L4-5 Discectomy    CARPAL TUNNEL RELEASE      left     SECTION      CHOLECYSTECTOMY      COLONOSCOPY      ENDOSCOPY      HYSTERECTOMY      KIDNEY STONE SURGERY      LUMBAR DISCECTOMY N/A 12/15/2016    Procedure: LUMBAR DISCECTOMY L3-4;  Surgeon: Sandro Cuenca MD;  Location: [facility];  Service:     OOPHORECTOMY      TONSILLECTOMY      WISDOM TOOTH EXTRACTION          Allergies   Allergen Reactions    Codeine Nausea And Vomiting    Percocet [Oxycodone-Acetaminophen] Nausea And Vomiting     Caused bad pain    Adhesive Tape Rash    Toradol [Ketorolac  "Tromethamine] Other (See Comments)     \"doesnt do anything\"   - DOESN'T WORK PER PATIENT        Social History     Tobacco Use   Smoking Status Never   Smokeless Tobacco Never        Objective   Physical Exam    Vitals:    03/22/24 0130   BP:    Pulse:    Resp:    Temp: 99.7 °F (37.6 °C)   SpO2:         Dermatology: ulceration to the medial right hallux has hyperkeratotic rim, drainage present. Probe to bone. Periwound erythma, moving proximally by 1st metatarsal.     Vascular: DP and PT pulses palpable    Neurological: light touch and protective sensation diminished    Musckuloskeletal: 1st MTPJ and AJ ROM normal, no crepitus    Labs     Lab Results   Component Value Date    HGBA1C 12.40 (H) 02/26/2024    POCGLU 128 03/22/2024    SEDRATE 20 02/23/2024        CBC:      Lab 03/22/24  0348 03/21/24  1527   WBC 7.19 9.59   HEMOGLOBIN 12.0 12.2   HEMATOCRIT 36.5 37.1   PLATELETS 140 147   NEUTROS ABS  --  7.93*   LYMPHS ABS  --  0.60*   MONOS ABS  --  0.98*   EOS ABS  --  0.00   MCV 91.9 89.4          Results for orders placed or performed during the hospital encounter of 02/23/24   Blood Culture - Blood, Arm, Left    Specimen: Arm, Left; Blood   Result Value Ref Range    Blood Culture No growth at 5 days         XR Chest 1 View  XR CHEST 1 VW-     Clinical: Fever     COMPARISON 12/24/2021     FINDINGS: Cardiac size within normal limits. No mediastinal or hilar  abnormality. Lungs are clear. No effusion or edema seen.     CONCLUSION: No active disease of the chest     This report was finalized on 3/21/2024 3:54 PM by Dr. Reji Keen M.D  on Workstation: [id number]          Assessment/Plan     59yo F Diabetic foot ulcer, possible osteomyelitis, right hallux    -Pt examined and evaluated by myself  -Labs reviewed, WBC stable, neutrophil count elevated. I will order ESR, CRP, A1c  -Will order ABIs  -The erythema and cellulitis appears worse from when I saw the patient 4 days ago. I will order Right foot MRI to evaluate " the ulcer and possible osteomyelitis    Thank you for consult, I will continue to follow      López Zapata DPM  Office: [phone]

## 2024-03-22 NOTE — CONSULTS
Diabetes Education    Patient Name:  Roxanna Beck  YOB: 1965  MRN: [id number]  Admit Date:  3/21/2024    RN consult for BG > 200.    Pt educated by this department on previous admission 2/26/24.  Please see notes.      Please re-consult for any educational needs.      Electronically signed by:  Felix Garza RN, [facility]  03/22/24 07:39 EDT

## 2024-03-22 NOTE — PLAN OF CARE
Goal Outcome Evaluation:         VSS, sinus rhythm, room air. Pt had fever x2 this shift, PRN tylenol and ice pack given. Wound dressing changed, MRI from right great toe pending. Plan of care ongoing.

## 2024-03-22 NOTE — H&P
HISTORY AND PHYSICAL   [facility]        Date of Admission: 3/21/2024  Patient Identification:  Name: Roxanna Beck  Age: 58 y.o.  Sex: female  :  1965  MRN: [id number]                     Primary Care Physician: Donato Vilchis DO    Chief Complaint:  58 year old female presented to the emergency room with confusion and a fever; she has had nausea and vomiting; she was not able to eat today; she has a history of diabetes and was recently admitted with a diabetic foot ulcer; she denies shortness of breath or chest pain; temp was 103 when ems arrived; she was not aware that she was febrile; no visitors are present with her    History of Present Illness:   As above    Past Medical History:  Past Medical History:   Diagnosis Date    Arthritis     Depression     Diabetes mellitus     checks sugar once per day     Dizzy     Fibromyalgia     GERD (gastroesophageal reflux disease)     History of kidney stones     HNP (herniated nucleus pulposus)     Hyperlipidemia     Hypertension     Wears glasses     readers     Past Surgical History:  Past Surgical History:   Procedure Laterality Date    BACK SURGERY      L4-5 Discectomy    CARPAL TUNNEL RELEASE      left     SECTION      CHOLECYSTECTOMY      COLONOSCOPY      ENDOSCOPY      HYSTERECTOMY      KIDNEY STONE SURGERY      LUMBAR DISCECTOMY N/A 12/15/2016    Procedure: LUMBAR DISCECTOMY L3-4;  Surgeon: Sandro Cuenca MD;  Location: [facility];  Service:     OOPHORECTOMY      TONSILLECTOMY      WISDOM TOOTH EXTRACTION        Home Meds:  Medications Prior to Admission   Medication Sig Dispense Refill Last Dose    amitriptyline (ELAVIL) 25 MG tablet Take 3 tablets by mouth Every Night. (Patient taking differently: Take 2 tablets by mouth Every Night.) 90 tablet 0     baclofen (LIORESAL) 10 MG tablet Take 1 tablet by mouth 2 (Two) Times a Day. 60 tablet 0     cephalexin (KEFLEX) 500 MG capsule Take 1 capsule by mouth 2 (Two) Times a Day.        cetirizine (ZyrTEC) 10 MG tablet Take 1 tablet by mouth Daily.       DULoxetine (CYMBALTA) 60 MG capsule Take 1 capsule by mouth Daily.       gabapentin (NEURONTIN) 300 MG capsule Take 1 capsule by mouth At Night As Needed (For pain).       glipiZIDE (GLUCOTROL) 10 MG tablet Take 1 tablet by mouth 2 (Two) Times a Day Before Meals.       hydroCHLOROthiazide 25 MG tablet Take 1 tablet by mouth Daily.       Insulin Glargine (Lantus SoloStar) 100 UNIT/ML injection pen Inject 50 Units under the skin into the appropriate area as directed Daily.       Insulin Lispro, 1 Unit Dial, (HumaLOG KwikPen) 100 UNIT/ML solution pen-injector Inject 5 Units under the skin into the appropriate area as directed 3 (Three) Times a Day With Meals (Patient taking differently: Inject 5 Units under the skin into the appropriate area as directed 3 (Three) Times a Day Before Meals.) 15 mL 0     lisinopril (PRINIVIL,ZESTRIL) 20 MG tablet Take 1 tablet by mouth Daily.       meloxicam (MOBIC) 15 MG tablet Take 1 tablet by mouth Daily.       metFORMIN (GLUCOPHAGE) 1000 MG tablet Take 1 tablet by mouth 2 (Two) Times a Day.       omeprazole (PriLOSEC) 40 MG capsule Take 1 capsule by mouth Daily.       PARoxetine (PAXIL) 20 MG tablet Take 1 tablet by mouth Every Morning.       pioglitazone (ACTOS) 30 MG tablet Take 1 tablet by mouth Daily.       rosuvastatin (CRESTOR) 10 MG tablet Take 1 tablet by mouth Daily.       SUMAtriptan (IMITREX) 50 MG tablet Take 1 tablet by mouth Every 2 (Two) Hours As Needed for Migraine. Take one tablet at onset of headache. May repeat dose one time in 2 hours if headache not relieved.       Insulin Glargine-Lixisenatide (Soliqua) 100-33 UNT-MCG/ML solution pen-injector injection Inject 50 Units under the skin into the appropriate area as directed Daily.       Insulin Pen Needle (Pen Needles) 32G X 4 MM misc Inject 1 each under the skin into the appropriate area as directed 4 (Four) Times a Day As Needed (for insulin  "injections). Formulary Compliance Approval 100 each 12        Allergies:  Allergies   Allergen Reactions    Codeine Nausea And Vomiting    Percocet [Oxycodone-Acetaminophen] Nausea And Vomiting     Caused bad pain    Adhesive Tape Rash    Toradol [Ketorolac Tromethamine] Other (See Comments)     \"doesnt do anything\"   - DOESN'T WORK PER PATIENT     Immunizations:  Immunization History   Administered Date(s) Administered    COVID-19 (PFIZER) Purple Cap Monovalent 04/01/2021, 04/22/2021     Social History:   Social History     Social History Narrative    Not on file     Social History     Socioeconomic History    Marital status: Single    Number of children: 1    Years of education: High School   Tobacco Use    Smoking status: Never    Smokeless tobacco: Never   Vaping Use    Vaping status: Never Used   Substance and Sexual Activity    Alcohol use: No    Drug use: No    Sexual activity: Defer       Family History:  Family History   Problem Relation Age of Onset    Lung cancer Mother     Cancer Mother     Heart attack Father     Heart disease Father     Diabetes Sister     Hypertension Sister     Breast cancer Sister 70    Diabetes Brother     Hypertension Brother     Heart disease Paternal Grandmother     Heart disease Paternal Grandfather     Breast cancer Cousin         early 50's    Breast cancer Maternal Aunt         50's    Breast cancer Maternal Aunt         50's    Breast cancer Maternal Aunt         50's    Breast cancer Maternal Aunt         50's    Breast cancer Maternal Aunt         50's    Endometrial cancer Neg Hx     Ovarian cancer Neg Hx         Review of Systems  See history of present illness and past medical history.  Patient denies headache, dizziness, syncope, falls, trauma, change in vision, change in hearing, change in taste, changes in weight, changes in appetite, focal weakness, numbness, or paresthesia.  Patient denies chest pain, palpitations, dyspnea, orthopnea, PND, cough, sinus pressure, " "rhinorrhea, epistaxis, hemoptysis,  hematemesis, diarrhea, constipation or hematochezia.  Denies cold or heat intolerance, polydipsia, polyuria, polyphagia. Denies hematuria, pyuria, dysuria, hesitancy, frequency or urgency.        Objective:  T Max 24 hrs: Temp (24hrs), Av.5 °F (38.1 °C), Min:99.8 °F (37.7 °C), Max:101.2 °F (38.4 °C)    Vitals Ranges:   Temp:  [99.8 °F (37.7 °C)-101.2 °F (38.4 °C)] 99.8 °F (37.7 °C)  Heart Rate:  [] 100  Resp:  [20] 20  BP: (138-158)/() 147/84      Exam:  /84   Pulse 100   Temp 99.8 °F (37.7 °C) (Tympanic)   Resp 20   Ht 162.6 cm (64\")   Wt 80.7 kg (178 lb)   SpO2 93%   BMI 30.55 kg/m²     General Appearance:    Alert, cooperative, no distress, appears stated age   Head:    Normocephalic, without obvious abnormality, atraumatic   Eyes:    PERRL, conjunctivae/corneas clear, EOM's intact, both eyes   Ears:    Normal external ear canals, both ears   Nose:   Nares normal, septum midline, mucosa normal, no drainage    or sinus tenderness   Throat:   Lips, mucosa, and tongue normal   Neck:   Supple, symmetrical, trachea midline, no adenopathy;     thyroid:  no enlargement/tenderness/nodules; no carotid    bruit or JVD   Back:     Symmetric, no curvature, ROM normal, no CVA tenderness   Lungs:     Decreased breath sounds bilaterally, respirations unlabored   Chest Wall:    No tenderness or deformity    Heart:    Regular rate and rhythm, S1 and S2 normal, no murmur, rub   or gallop   Abdomen:     Soft, nontender, bowel sounds active all four quadrants,     no masses, no hepatomegaly, no splenomegaly   Extremities:   Extremities normal, atraumatic, dressing in place      .    Data Review:  Labs in chart were reviewed.  WBC   Date Value Ref Range Status   2024 9.59 3.40 - 10.80 10*3/mm3 Final     Hemoglobin   Date Value Ref Range Status   2024 12.2 12.0 - 15.9 g/dL Final     Hematocrit   Date Value Ref Range Status   2024 37.1 34.0 - 46.6 % " Final     Platelets   Date Value Ref Range Status   03/21/2024 147 140 - 450 10*3/mm3 Final     Sodium   Date Value Ref Range Status   03/21/2024 132 (L) 136 - 145 mmol/L Final     Potassium   Date Value Ref Range Status   03/21/2024 4.8 3.5 - 5.2 mmol/L Final     Comment:     Slight hemolysis detected by analyzer. Result may be falsely elevated.     Chloride   Date Value Ref Range Status   03/21/2024 95 (L) 98 - 107 mmol/L Final     CO2   Date Value Ref Range Status   03/21/2024 23.8 22.0 - 29.0 mmol/L Final     BUN   Date Value Ref Range Status   03/21/2024 22 (H) 6 - 20 mg/dL Final     Creatinine   Date Value Ref Range Status   03/21/2024 0.93 0.57 - 1.00 mg/dL Final     Glucose   Date Value Ref Range Status   03/21/2024 322 (H) 65 - 99 mg/dL Final     Calcium   Date Value Ref Range Status   03/21/2024 8.6 8.6 - 10.5 mg/dL Final                Imaging Results (All)       Procedure Component Value Units Date/Time    XR Chest 1 View [[id number]] Collected: 03/21/24 1553     Updated: 03/21/24 1557    Narrative:      XR CHEST 1 VW-     Clinical: Fever     COMPARISON 12/24/2021     FINDINGS: Cardiac size within normal limits. No mediastinal or hilar  abnormality. Lungs are clear. No effusion or edema seen.     CONCLUSION: No active disease of the chest     This report was finalized on 3/21/2024 3:54 PM by Dr. Reji Keen M.D  on Workstation: [id number]                 Assessment:  Active Hospital Problems    Diagnosis  POA    **Acute metabolic encephalopathy [G93.41]  Yes      Resolved Hospital Problems   No resolved problems to display.   Sepsis  Diabetes with foot ulcer  Hypertension  Hyperlipidemia  Nausea and vomiting  obesity    Plan:  Will continue antibiotics  Ask id to see her  Ask podiatry to see her  Lactic acid has normalized  Monitor on telemetry  Accu checks, insulin sliding scale  Dw patient and ed provider    Victoria Roche MD  3/21/2024  20:30 EDT

## 2024-03-22 NOTE — PLAN OF CARE
Goal Outcome Evaluation:  Plan of Care Reviewed With: (P) patient           Outcome Evaluation: (P) Pt is a 58 y.o. F who presented to [facility] s/p fall with AMS, n/v, and fever. Further workup discovered infection of diabetic foot ulcer on R great toe. Pt also with recent admission to [facility] 2/23-2/26 regarding this wound. PMH includes T2DM, peripheral neuropathy, HTN, HLD. Pt greeted in supine, agreeable to PT evaluation. A&O x4, reports PLOF as mod-I with RW, lives in single story home with 18 y.o. grandson, and 0 SANDRA. Presents with noted balance, endurance, strength, and sensation deficits. Completes sup to sit with SBA-Pavel x1, STS x2 from EOB with Pavel x2, and ambulates x30 ft total with RW and CGA x1. Demonstrates decreased jeff, slight unsteadiness, and fwd flexed posture. Pt completes seated LE therex x10 B. Pt left [facility] with needs met, boyfriend present at end of session. PT will continue to follow throughout course of care at [facility] to addres functional deficits. Recommending d/c SNF vs home with assist pending progress.      Anticipated Discharge Disposition (PT): (P) home with assist, skilled nursing facility

## 2024-03-22 NOTE — PROGRESS NOTES
"[facility] Clinical Pharmacy Services: Vancomycin Monitoring Note    Roxanna Beck is a 58 y.o. female who is on day 1/10 of pharmacy to dose vancomycin for sepsis, SSTI    Updated Cultures and Sensitivities: 3/21 bcx pending       Vitals/Labs  Ht: 162.6 cm (64\"); Wt: 81.9 kg (180 lb 8 oz)   Temp Readings from Last 1 Encounters:   03/22/24 98.8 °F (37.1 °C) (Oral)     Estimated Creatinine Clearance: 85.9 mL/min (by C-G formula based on SCr of 0.73 mg/dL).       Results from last 7 days   Lab Units 03/22/24  0348 03/21/24  1527   CREATININE mg/dL 0.73 0.93   WBC 10*3/mm3 7.19 9.59     Assessment/Plan    Current Vancomycin Dose: 1750 mg x1 then 1000 mg IV every  12  hours; provides a predicted  mg/L.hr   Next Level Date and Time: Vanc Trough on 3/24 at 1030   We will continue to monitor patient changes and renal function     Thank you for involving pharmacy in this patient's care. Please contact pharmacy with any questions or concerns.       Sandy Holder [facility]  Clinical Pharmacist          "

## 2024-03-22 NOTE — THERAPY EVALUATION
Patient Name: Roxanna Beck  : 1965    MRN: [id number]                              Today's Date: 3/22/2024       Admit Date: 3/21/2024    Visit Dx:     ICD-10-CM ICD-9-CM   1. Acute metabolic encephalopathy  G93.41 348.31   2. Diabetic foot infection  E11.628 250.80    L08.9 686.9     Patient Active Problem List   Diagnosis    Lumbar disc herniation    Migraine    Hyperglycemia    Diabetic ulcer of right great toe    Diabetic peripheral neuropathy    Generalized anxiety disorder    Insulin dependent type 2 diabetes mellitus    Migraine headache    Hypertension    Hyperglycemia due to diabetes mellitus    Acute metabolic encephalopathy    Mixed hyperlipidemia    GERD (gastroesophageal reflux disease)    Nausea & vomiting    Fever    Hyponatremia    Sepsis     Past Medical History:   Diagnosis Date    Arthritis     Depression     Diabetes mellitus     checks sugar once per day     Dizzy     Fibromyalgia     GERD (gastroesophageal reflux disease)     History of kidney stones     HNP (herniated nucleus pulposus)     Hyperlipidemia     Hypertension     Nausea & vomiting 3/22/2024    Wears glasses     readers     Past Surgical History:   Procedure Laterality Date    BACK SURGERY      L4-5 Discectomy    CARPAL TUNNEL RELEASE      left     SECTION      CHOLECYSTECTOMY      COLONOSCOPY      ENDOSCOPY      HYSTERECTOMY      KIDNEY STONE SURGERY      LUMBAR DISCECTOMY N/A 12/15/2016    Procedure: LUMBAR DISCECTOMY L3-4;  Surgeon: Sandro Cuenca MD;  Location: [facility];  Service:     OOPHORECTOMY      TONSILLECTOMY      WISDOM TOOTH EXTRACTION        General Information       Row Name 24 1600          Physical Therapy Time and Intention    Document Type evaluation (P)   -HB     Mode of Treatment individual therapy;physical therapy (P)   -HB       Row Name 24 1600          General Information    Patient Profile Reviewed yes (P)   -HB     Existing Precautions/Restrictions fall (P)   -HB      Barriers to Rehab none identified (P)   -       Row Name 03/22/24 1600          Living Environment    People in Home grandchild(charly) (P)   18 y.o. grandson  -       Row Name 03/22/24 1600          Home Main Entrance    Number of Stairs, Main Entrance none (P)   -       Row Name 03/22/24 1600          Stairs Within Home, Primary    Number of Stairs, Within Home, Primary none (P)   -       Row Name 03/22/24 1600          Cognition    Orientation Status (Cognition) oriented x 4 (P)   -[facility] Name 03/22/24 1600          Safety Issues, Functional Mobility    Impairments Affecting Function (Mobility) balance;coordination;endurance/activity tolerance;sensation/sensory awareness;strength (P)   -               User Key  (r) = Recorded By, (t) = Taken By, (c) = Cosigned By      Initials Name Provider Type    Traci Singh, PT Student PT Student                   Mobility       Row Name 03/22/24 1608          Bed Mobility    Bed Mobility supine-sit (P)   -     Supine-Sit [place] (Bed Mobility) standby assist;minimum assist (75% patient effort) (P)   -     Assistive Device (Bed Mobility) bed rails (P)   -     Comment, (Bed Mobility) Assist with trunk management into sitting position EOB (P)   -       Row Name 03/22/24 1608          Bed-Chair Transfer    Bed-Chair [place] (Transfers) not tested (P)   -HB       Row Name 03/22/24 1608          Sit-Stand Transfer    Sit-Stand [place] (Transfers) minimum assist (75% patient effort);2 person assist;verbal cues (P)   -     Assistive Device (Sit-Stand Transfers) walker, front-wheeled (P)   -     Comment, (Sit-Stand Transfer) STS x2 from EOB with Pavel x2, VC's for UE positioning and safety (P)   -       Row Name 03/22/24 1608          Gait/Stairs (Locomotion)    [place] Level (Gait) verbal cues;contact guard (P)   -HB     Distance in Feet (Gait) 30 (P)   -HB     Deviations/Abnormal Patterns (Gait) base of support, narrow;jeff  decreased;gait speed decreased;stride length decreased (P)   -HB     Bilateral Gait Deviations forward flexed posture (P)   -     [place] Level (Stairs) not tested (P)   -     Comment, (Gait/Stairs) Pt ambulates x30 ft total in 2 bouts (20 ft, 10 ft) with seated rest break between bouts, no LOB or unsteadiness noted (P)   -               User Key  (r) = Recorded By, (t) = Taken By, (c) = Cosigned By      Initials Name Provider Type     Traci Bell, PT Student PT Student                   Obj/Interventions       Row Name 03/22/24 1603          Range of Motion Comprehensive    General Range of Motion no range of motion deficits identified (P)   -[facility] Name 03/22/24 1603          Strength Comprehensive (MMT)    General Manual Muscle Testing (MMT) Assessment lower extremity strength deficits identified (P)   -     Comment, General Manual Muscle Testing (MMT) Assessment BLE grossly 3+/5 (P)   -[facility] Name 03/22/24 1603          Motor Skills    Therapeutic Exercise other (see comments) (P)   Seated EOB, x10 B, LAQ and MIP  -[facility] Name 03/22/24 1603          Balance    Balance Assessment sitting dynamic balance;sitting static balance;standing static balance;standing dynamic balance (P)   -     Static Sitting Balance supervision (P)   -     Dynamic Sitting Balance standby assist (P)   -     Position, Sitting Balance unsupported;sitting edge of bed (P)   -HB     Static Standing Balance standby assist (P)   -     Dynamic Standing Balance contact guard (P)   -     Position/Device Used, Standing Balance supported;walker, front-wheeled (P)   -HB     Balance Interventions sitting;standing;sit to stand;weight shifting activity (P)   -     Comment, Balance Seated EOB x15 min., no LOB noted. Standing balance x5 min., supported with RW, no LOB noted. (P)   -       Row Name 03/22/24 1603          Sensory Assessment (Somatosensory)    Sensory Assessment (Somatosensory) bilateral  UE;bilateral LE (P)   -HB     Bilateral UE Sensory Assessment light touch awareness;impaired (P)   Peripheral neuropathy below level of elbow, bilateral  -HB     Bilateral LE Sensory Assessment light touch awareness;impaired (P)   Peripheral neuropathy below level of ankle, bilateral  -HB               User Key  (r) = Recorded By, (t) = Taken By, (c) = Cosigned By      Initials Name Provider Type    Traci Singh, PT Student PT Student                   Goals/Plan       Row Name 03/22/24 1622          Bed Mobility Goal 1 (PT)    Activity/Assistive Device (Bed Mobility Goal 1, PT) bed mobility activities, all (P)   -HB     [place] Level/Cues Needed (Bed Mobility Goal 1, PT) modified independence (P)   -HB     Time Frame (Bed Mobility Goal 1, PT) 1 week (P)   -HB       Row Name 03/22/24 1622          Transfer Goal 1 (PT)    Activity/Assistive Device (Transfer Goal 1, PT) sit-to-stand/stand-to-sit (P)   -HB     [place] Level/Cues Needed (Transfer Goal 1, PT) modified independence (P)   -HB     Time Frame (Transfer Goal 1, PT) 1 week (P)   -HB       Row Name 03/22/24 1622          Gait Training Goal 1 (PT)    Activity/Assistive Device (Gait Training Goal 1, PT) gait (walking locomotion) (P)   -HB     [place] Level (Gait Training Goal 1, PT) modified independence (P)   -HB     Time Frame (Gait Training Goal 1, PT) 1 week (P)   -       Row Name 03/22/24 1622          Therapy Assessment/Plan (PT)    Planned Therapy Interventions (PT) balance training;bed mobility training;gait training;home exercise program;patient/family education;neuromuscular re-education;postural re-education;strengthening;transfer training;stair training (P)   -HB               User Key  (r) = Recorded By, (t) = Taken By, (c) = Cosigned By      Initials Name Provider Type    Tarci Singh, PT Student PT Student                   Clinical Impression       Row Name 03/22/24 1610          Pain    Pretreatment Pain Rating 0/10 - no  pain (P)   -HB     Posttreatment Pain Rating 0/10 - no pain (P)   -HB     Pre/Posttreatment Pain Comment Pt reports peripheral neuropathy in B feet so no reports of pain regarding great toe wound (P)   -HB       Row Name 03/22/24 1610          Plan of Care Review    Plan of Care Reviewed With patient (P)   -HB     Outcome Evaluation Pt is a 58 y.o. F who presented to [facility] s/p fall with AMS, n/v, and fever. Further workup discovered infection of diabetic foot ulcer on R great toe. Pt also with recent admission to [facility] 2/23-2/26 regarding this wound. PMH includes T2DM, peripheral neuropathy, HTN, HLD. Pt greeted in supine, agreeable to PT evaluation. A&O x4, reports PLOF as mod-I with RW, lives in single story home with 18 y.o. grandson, and 0 SANDRA. Presents with noted balance, endurance, strength, and sensation deficits. Completes sup to sit with SBA-Pavel x1, STS x2 from EOB with Pavel x2, and ambulates x30 ft total with RW and CGA x1. Demonstrates decreased jeff, slight unsteadiness, and fwd flexed posture. Pt completes seated LE therex x10 B. Pt left [facility] with needs met, boyfriend present at end of session. PT will continue to follow throughout course of care at [facility] to addres functional deficits. Recommending d/c SNF vs home with assist pending progress. (P)   -HB       Row Name 03/22/24 1610          Therapy Assessment/Plan (PT)    Rehab Potential (PT) good, to achieve stated therapy goals (P)   -HB     Criteria for Skilled Interventions Met (PT) yes;meets criteria;skilled treatment is necessary (P)   -HB     Therapy Frequency (PT) 5 times/wk (P)   -HB       Row Name 03/22/24 1610          Vital Signs    O2 Delivery Pre Treatment room air (P)   -HB     O2 Delivery Intra Treatment room air (P)   -HB     O2 Delivery Post Treatment room air (P)   -HB     Pre Patient Position Supine (P)   -HB     Intra Patient Position Standing (P)   -HB     Post Patient Position Sitting (P)   -HB       Row Name 03/22/24 1610           Positioning and Restraints    Pre-Treatment Position in bed (P)   -HB     Post Treatment Position chair (P)   -HB     In Chair sitting;call light within reach;encouraged to call for assist;exit alarm on;with family/caregiver (P)   -HB               User Key  (r) = Recorded By, (t) = Taken By, (c) = Cosigned By      Initials Name Provider Type    Traci Singh, PT Student PT Student                   Outcome Measures       Row Name 03/22/24 1623          How much help from another person do you currently need...    Turning from your back to your side while in flat bed without using bedrails? 3 (P)   -HB     Moving from lying on back to sitting on the side of a flat bed without bedrails? 3 (P)   -HB     Moving to and from a bed to a chair (including a wheelchair)? 3 (P)   -HB     Standing up from a chair using your arms (e.g., wheelchair, bedside chair)? 3 (P)   -HB     Climbing 3-5 steps with a railing? 1 (P)   -HB     To walk in hospital room? 3 (P)   -HB     AM-PAC 6 Clicks Score (PT) 16 (P)   -HB     Highest Level of Mobility Goal 5 --> Static standing (P)   -HB       Row Name 03/22/24 1623          Functional Assessment    Outcome Measure Options AM-PAC 6 Clicks Basic Mobility (PT) (P)   -HB               User Key  (r) = Recorded By, (t) = Taken By, (c) = Cosigned By      Initials Name Provider Type    Traci Singh, PT Student PT Student                                 Physical Therapy Education       Title: PT OT SLP Therapies (Done)       Topic: Physical Therapy (Done)       Point: Mobility training (Done)       Learning Progress Summary             Patient Acceptance, E, VU by HB at 3/22/2024 1624                         Point: Home exercise program (Done)       Learning Progress Summary             Patient Acceptance, E, VU by HB at 3/22/2024 1624                         Point: Body mechanics (Done)       Learning Progress Summary             Patient Acceptance, E, VU by HB at 3/22/2024 1624                          Point: Precautions (Done)       Learning Progress Summary             Patient Acceptance, E, VU by  at 3/22/2024 9076                                         User Key       Initials Effective Dates Name Provider Type Discipline     02/21/24 -  Traci Bell, PT Student PT Student PT                  PT Recommendation and Plan  Planned Therapy Interventions (PT): (P) balance training, bed mobility training, gait training, home exercise program, patient/family education, neuromuscular re-education, postural re-education, strengthening, transfer training, stair training  Plan of Care Reviewed With: (P) patient  Outcome Evaluation: (P) Pt is a 58 y.o. F who presented to [facility] s/p fall with AMS, n/v, and fever. Further workup discovered infection of diabetic foot ulcer on R great toe. Pt also with recent admission to [facility] 2/23-2/26 regarding this wound. PMH includes T2DM, peripheral neuropathy, HTN, HLD. Pt greeted in supine, agreeable to PT evaluation. A&O x4, reports PLOF as mod-I with RW, lives in single story home with 18 y.o. grandson, and 0 SANDRA. Presents with noted balance, endurance, strength, and sensation deficits. Completes sup to sit with SBA-Pavel x1, STS x2 from EOB with Pavel x2, and ambulates x30 ft total with RW and CGA x1. Demonstrates decreased jeff, slight unsteadiness, and fwd flexed posture. Pt completes seated LE therex x10 B. Pt left [facility] with needs met, boyfriend present at end of session. PT will continue to follow throughout course of care at [facility] to addres functional deficits. Recommending d/c SNF vs home with assist pending progress.     Time Calculation:         PT Charges       Row Name 03/22/24 0712             Time Calculation    Start Time 1437 (P)   -HB      Stop Time 1508 (P)   -HB      Time Calculation (min) 31 min (P)   -HB      PT Received On 03/22/24 (P)   -HB      PT - Next Appointment 03/25/24 (P)   -      PT Goal Re-Cert Due Date 03/29/24 (P)   -HB         Time  Calculation- PT    Total Timed Code Minutes- PT 23 minute(s) (P)   -HB         Timed Charges    19735 - PT Therapeutic Exercise Minutes 8 (P)   -HB      86233 - PT Therapeutic Activity Minutes 15 (P)   -HB         Total Minutes    Timed Charges Total Minutes 23 (P)   -HB       Total Minutes 23 (P)   -HB                User Key  (r) = Recorded By, (t) = Taken By, (c) = Cosigned By      Initials Name Provider Type     Traci Bell, PT Student PT Student                  Therapy Charges for Today       Code Description Service Date Service Provider Modifiers Qty    [id number]  PT THER PROC EA 15 MIN 3/22/2024 Traci Bell, PT Student GP 1    [id number] HC PT THERAPEUTIC ACT EA 15 MIN 3/22/2024 Traci Bell, PT Student GP 1    [id number]  PT EVAL MOD COMPLEXITY 2 3/22/2024 Traci Bell, PT Student GP 1            PT G-Codes  Outcome Measure Options: (P) AM-PAC 6 Clicks Basic Mobility (PT)  AM-PAC 6 Clicks Score (PT): (P) 16  PT Discharge Summary  Anticipated Discharge Disposition (PT): (P) home with assist, skilled nursing facility    Traci Bell PT Student  3/22/2024

## 2024-03-22 NOTE — PAYOR COMM NOTE
"Mariela Beck (58 y.o. Female)      SEE FOR INPATIENT:  REF# [id number]     DEPT: [phone]    [facility]: [facility] [id number] [facility]# [id number]    HUYEN LAWS RN,CCP     Date of Birth   1965    Social Security Number       Address   [address] [place] [address]    Home Phone       MRN   [id number]       [employer]    Marital Status   Single                            Admission Date   3/21/24    Admission Type   Emergency    Admitting Provider   Victoria Roche MD    Attending Provider   Kale Villarreal MD    Department, Room/Bed   [address], S413/1       Discharge Date       Discharge Disposition       Discharge Destination                                 Attending Provider: Kale Villarreal MD    Allergies: Codeine, Percocet [Oxycodone-acetaminophen], Adhesive Tape, Toradol [Ketorolac Tromethamine]    Isolation: None   Infection: None   Code Status: CPR    Ht: 162.6 cm (64\")   Wt: 81.9 kg (180 lb 8 oz)    Admission Cmt: None   Principal Problem: Sepsis [A41.9]                   Active Insurance as of 3/21/2024       Primary Coverage       Payor Plan Insurance Group Employer/Plan Group    ANTHEM MEDICAID ANTHEM MEDICAID [id number]       Payor Plan Address Payor Plan Phone Number Payor Plan Fax Number Effective Dates    [address] [address] [phone]  1/1/2024 - None Entered    [facility] [id number]         Subscriber Name Subscriber Birth Date Member ID       MARIELA BECK 1965 [id number]                     Emergency Contacts        (Rel.) Home Phone Work Phone Mobile Phone    Sarah Zayas (Sister) -- -- [phone]    PIERO ARROYO (Daughter) -- -- [phone]    Barb Beck (Brother) [phone] -- --    BERTHA TRIPP (Significant Other) -- -- [phone]                 History & Physical        Victoria Roche MD at 03/21/24 2030          HISTORY AND PHYSICAL   [facility]        Date of Admission: 3/21/2024  Patient Identification:  Name: Roxanna Beck  Age: 58 y.o.  Sex: female  :  1965  MRN: [id number]                     Primary Care Physician: Donato Vilchis DO    Chief Complaint:  58 year old female presented to the emergency room with confusion and a fever; she has had nausea and vomiting; she was not able to eat today; she has a history of diabetes and was recently admitted with a diabetic foot ulcer; she denies shortness of breath or chest pain; temp was 103 when ems arrived; she was not aware that she was febrile; no visitors are present with her    History of Present Illness:   As above    Past Medical History:  Past Medical History:   Diagnosis Date    Arthritis     Depression     Diabetes mellitus     checks sugar once per day     Dizzy     Fibromyalgia     GERD (gastroesophageal reflux disease)     History of kidney stones     HNP (herniated nucleus pulposus)     Hyperlipidemia     Hypertension     Wears glasses     readers     Past Surgical History:  Past Surgical History:   Procedure Laterality Date    BACK SURGERY      L4-5 Discectomy    CARPAL TUNNEL RELEASE      left     SECTION      CHOLECYSTECTOMY      COLONOSCOPY      ENDOSCOPY      HYSTERECTOMY      KIDNEY STONE SURGERY      LUMBAR DISCECTOMY N/A 12/15/2016    Procedure: LUMBAR DISCECTOMY L3-4;  Surgeon: Sandro Cuenca MD;  Location: [facility];  Service:     OOPHORECTOMY      TONSILLECTOMY      WISDOM TOOTH EXTRACTION        Home Meds:  Medications Prior to Admission   Medication Sig Dispense Refill Last Dose    amitriptyline (ELAVIL) 25 MG tablet Take 3 tablets by mouth Every Night. (Patient taking differently: Take 2 tablets by mouth Every Night.) 90 tablet 0     baclofen (LIORESAL) 10 MG tablet Take 1 tablet by mouth 2 (Two) Times a Day. 60 tablet 0     cephalexin (KEFLEX) 500 MG capsule Take 1 capsule by mouth 2 (Two) Times a Day.       cetirizine (ZyrTEC) 10 MG  tablet Take 1 tablet by mouth Daily.       DULoxetine (CYMBALTA) 60 MG capsule Take 1 capsule by mouth Daily.       gabapentin (NEURONTIN) 300 MG capsule Take 1 capsule by mouth At Night As Needed (For pain).       glipiZIDE (GLUCOTROL) 10 MG tablet Take 1 tablet by mouth 2 (Two) Times a Day Before Meals.       hydroCHLOROthiazide 25 MG tablet Take 1 tablet by mouth Daily.       Insulin Glargine (Lantus SoloStar) 100 UNIT/ML injection pen Inject 50 Units under the skin into the appropriate area as directed Daily.       Insulin Lispro, 1 Unit Dial, (HumaLOG KwikPen) 100 UNIT/ML solution pen-injector Inject 5 Units under the skin into the appropriate area as directed 3 (Three) Times a Day With Meals (Patient taking differently: Inject 5 Units under the skin into the appropriate area as directed 3 (Three) Times a Day Before Meals.) 15 mL 0     lisinopril (PRINIVIL,ZESTRIL) 20 MG tablet Take 1 tablet by mouth Daily.       meloxicam (MOBIC) 15 MG tablet Take 1 tablet by mouth Daily.       metFORMIN (GLUCOPHAGE) 1000 MG tablet Take 1 tablet by mouth 2 (Two) Times a Day.       omeprazole (PriLOSEC) 40 MG capsule Take 1 capsule by mouth Daily.       PARoxetine (PAXIL) 20 MG tablet Take 1 tablet by mouth Every Morning.       pioglitazone (ACTOS) 30 MG tablet Take 1 tablet by mouth Daily.       rosuvastatin (CRESTOR) 10 MG tablet Take 1 tablet by mouth Daily.       SUMAtriptan (IMITREX) 50 MG tablet Take 1 tablet by mouth Every 2 (Two) Hours As Needed for Migraine. Take one tablet at onset of headache. May repeat dose one time in 2 hours if headache not relieved.       Insulin Glargine-Lixisenatide (Soliqua) 100-33 UNT-MCG/ML solution pen-injector injection Inject 50 Units under the skin into the appropriate area as directed Daily.       Insulin Pen Needle (Pen Needles) 32G X 4 MM misc Inject 1 each under the skin into the appropriate area as directed 4 (Four) Times a Day As Needed (for insulin injections). Formulary  "Compliance Approval 100 each 12        Allergies:  Allergies   Allergen Reactions    Codeine Nausea And Vomiting    Percocet [Oxycodone-Acetaminophen] Nausea And Vomiting     Caused bad pain    Adhesive Tape Rash    Toradol [Ketorolac Tromethamine] Other (See Comments)     \"doesnt do anything\"   - DOESN'T WORK PER PATIENT     Immunizations:  Immunization History   Administered Date(s) Administered    COVID-19 (PFIZER) Purple Cap Monovalent 04/01/2021, 04/22/2021     Social History:   Social History     Social History Narrative    Not on file     Social History     Socioeconomic History    Marital status: Single    Number of children: 1    Years of education: High School   Tobacco Use    Smoking status: Never    Smokeless tobacco: Never   Vaping Use    Vaping status: Never Used   Substance and Sexual Activity    Alcohol use: No    Drug use: No    Sexual activity: Defer       Family History:  Family History   Problem Relation Age of Onset    Lung cancer Mother     Cancer Mother     Heart attack Father     Heart disease Father     Diabetes Sister     Hypertension Sister     Breast cancer Sister 70    Diabetes Brother     Hypertension Brother     Heart disease Paternal Grandmother     Heart disease Paternal Grandfather     Breast cancer Cousin         early 50's    Breast cancer Maternal Aunt         50's    Breast cancer Maternal Aunt         50's    Breast cancer Maternal Aunt         50's    Breast cancer Maternal Aunt         50's    Breast cancer Maternal Aunt         50's    Endometrial cancer Neg Hx     Ovarian cancer Neg Hx         Review of Systems  See history of present illness and past medical history.  Patient denies headache, dizziness, syncope, falls, trauma, change in vision, change in hearing, change in taste, changes in weight, changes in appetite, focal weakness, numbness, or paresthesia.  Patient denies chest pain, palpitations, dyspnea, orthopnea, PND, cough, sinus pressure, rhinorrhea, epistaxis, " "hemoptysis,  hematemesis, diarrhea, constipation or hematochezia.  Denies cold or heat intolerance, polydipsia, polyuria, polyphagia. Denies hematuria, pyuria, dysuria, hesitancy, frequency or urgency.        Objective:  T Max 24 hrs: Temp (24hrs), Av.5 °F (38.1 °C), Min:99.8 °F (37.7 °C), Max:101.2 °F (38.4 °C)    Vitals Ranges:   Temp:  [99.8 °F (37.7 °C)-101.2 °F (38.4 °C)] 99.8 °F (37.7 °C)  Heart Rate:  [] 100  Resp:  [20] 20  BP: (138-158)/() 147/84      Exam:  /84   Pulse 100   Temp 99.8 °F (37.7 °C) (Tympanic)   Resp 20   Ht 162.6 cm (64\")   Wt 80.7 kg (178 lb)   SpO2 93%   BMI 30.55 kg/m²     General Appearance:    Alert, cooperative, no distress, appears stated age   Head:    Normocephalic, without obvious abnormality, atraumatic   Eyes:    PERRL, conjunctivae/corneas clear, EOM's intact, both eyes   Ears:    Normal external ear canals, both ears   Nose:   Nares normal, septum midline, mucosa normal, no drainage    or sinus tenderness   Throat:   Lips, mucosa, and tongue normal   Neck:   Supple, symmetrical, trachea midline, no adenopathy;     thyroid:  no enlargement/tenderness/nodules; no carotid    bruit or JVD   Back:     Symmetric, no curvature, ROM normal, no CVA tenderness   Lungs:     Decreased breath sounds bilaterally, respirations unlabored   Chest Wall:    No tenderness or deformity    Heart:    Regular rate and rhythm, S1 and S2 normal, no murmur, rub   or gallop   Abdomen:     Soft, nontender, bowel sounds active all four quadrants,     no masses, no hepatomegaly, no splenomegaly   Extremities:   Extremities normal, atraumatic, dressing in place      .    Data Review:  Labs in chart were reviewed.  WBC   Date Value Ref Range Status   2024 9.59 3.40 - 10.80 10*3/mm3 Final     Hemoglobin   Date Value Ref Range Status   2024 12.2 12.0 - 15.9 g/dL Final     Hematocrit   Date Value Ref Range Status   2024 37.1 34.0 - 46.6 % Final     Platelets   Date " 2% Lidocaine Value Ref Range Status   03/21/2024 147 140 - 450 10*3/mm3 Final     Sodium   Date Value Ref Range Status   03/21/2024 132 (L) 136 - 145 mmol/L Final     Potassium   Date Value Ref Range Status   03/21/2024 4.8 3.5 - 5.2 mmol/L Final     Comment:     Slight hemolysis detected by analyzer. Result may be falsely elevated.     Chloride   Date Value Ref Range Status   03/21/2024 95 (L) 98 - 107 mmol/L Final     CO2   Date Value Ref Range Status   03/21/2024 23.8 22.0 - 29.0 mmol/L Final     BUN   Date Value Ref Range Status   03/21/2024 22 (H) 6 - 20 mg/dL Final     Creatinine   Date Value Ref Range Status   03/21/2024 0.93 0.57 - 1.00 mg/dL Final     Glucose   Date Value Ref Range Status   03/21/2024 322 (H) 65 - 99 mg/dL Final     Calcium   Date Value Ref Range Status   03/21/2024 8.6 8.6 - 10.5 mg/dL Final                Imaging Results (All)       Procedure Component Value Units Date/Time    XR Chest 1 View [[id number]] Collected: 03/21/24 1553     Updated: 03/21/24 1557    Narrative:      XR CHEST 1 VW-     Clinical: Fever     COMPARISON 12/24/2021     FINDINGS: Cardiac size within normal limits. No mediastinal or hilar  abnormality. Lungs are clear. No effusion or edema seen.     CONCLUSION: No active disease of the chest     This report was finalized on 3/21/2024 3:54 PM by Dr. Reji Keen M.D  on Workstation: [id number]                 Assessment:  Active Hospital Problems    Diagnosis  POA    **Acute metabolic encephalopathy [G93.41]  Yes      Resolved Hospital Problems   No resolved problems to display.   Sepsis  Diabetes with foot ulcer  Hypertension  Hyperlipidemia  Nausea and vomiting  obesity    Plan:  Will continue antibiotics  Ask id to see her  Ask podiatry to see her  Lactic acid has normalized  Monitor on telemetry  Accu checks, insulin sliding scale  Dw patient and ed provider    Victoria Roche MD  3/21/2024  20:30 EDT      Electronically signed by Victoria Roche MD at 03/21/24 2034           Emergency Department Notes        Vinicius Ramirez, RN at 03/21/24 1715          Nursing report ED to floor  Roxanna Beck  58 y.o.  female    HPI :  HPI (Adult)  Stated Reason for Visit: AMS, fever, fall, incotince that began last night approx. 2130  History Obtained From: EMS  Precipitating Event(s): none    Chief Complaint  Chief Complaint   Patient presents with    Altered Mental Status       Admitting doctor:   Victoria Roche MD    Admitting diagnosis:   The primary encounter diagnosis was Acute metabolic encephalopathy. A diagnosis of Diabetic foot infection was also pertinent to this visit.    Code status:   Current Code Status       Date Active Code Status Order ID Comments User Context       3/21/2024 1657 CPR (Attempt to Resuscitate) [id number]  Victoria Roceh MD ED        Question Answer    Code Status (Patient has no pulse and is not breathing) CPR (Attempt to Resuscitate)    Medical Interventions (Patient has pulse or is breathing) Full                    Allergies:   Codeine, Percocet [oxycodone-acetaminophen], Adhesive tape, and Toradol [ketorolac tromethamine]    Isolation:   No active isolations    Intake and Output  No intake or output data in the 24 hours ending 03/21/24 1715    Weight:       03/21/24  1459   Weight: 80.7 kg (178 lb)       Most recent vitals:   Vitals:    03/21/24 1603 03/21/24 1606 03/21/24 1630 03/21/24 1706   BP: 146/85 (!) 158/105  138/84   Pulse: 104 104  103   Resp:  20     Temp:   99.8 °F (37.7 °C)    TempSrc:   Tympanic    SpO2: 94% 96%  97%   Weight:       Height:           Active LDAs/IV Access:   Lines, Drains & Airways       Active LDAs       Name Placement date Placement time Site Days    Peripheral IV 03/21/24 1439 Left Antecubital 03/21/24  1439  Antecubital  less than 1    External Urinary Catheter 03/21/24  1524  --  less than 1                    Labs (abnormal labs have a star):   Labs Reviewed   COMPREHENSIVE METABOLIC PANEL - Abnormal;  "Notable for the following components:       Result Value    Glucose 322 (*)     BUN 22 (*)     Sodium 132 (*)     Chloride 95 (*)     ALT (SGPT) 35 (*)     All other components within normal limits    Narrative:     GFR Normal >60  Chronic Kidney Disease <60  Kidney Failure <15     LACTIC ACID, PLASMA - Abnormal; Notable for the following components:    Lactate 2.4 (*)     All other components within normal limits   PROCALCITONIN - Abnormal; Notable for the following components:    Procalcitonin 0.27 (*)     All other components within normal limits    Narrative:     As a Marker for Sepsis (Non-Neonates):    1. <0.5 ng/mL represents a low risk of severe sepsis and/or septic shock.  2. >2 ng/mL represents a high risk of severe sepsis and/or septic shock.    As a Marker for Lower Respiratory Tract Infections that require antibiotic therapy:    PCT on Admission    Antibiotic Therapy       6-12 Hrs later    >0.5                Strongly Recommended  >0.25 - <0.5        Recommended   0.1 - 0.25          Discouraged              Remeasure/reassess PCT  <0.1                Strongly Discouraged     Remeasure/reassess PCT    As 28 day mortality risk marker: \"Change in Procalcitonin Result\" (>80% or <=80%) if Day 0 (or Day 1) and Day 4 values are available. Refer to [email or web address]    Change in PCT <=80%  A decrease of PCT levels below or equal to 80% defines a positive change in PCT test result representing a higher risk for 28-day all-cause mortality of patients diagnosed with severe sepsis for septic shock.    Change in PCT >80%  A decrease of PCT levels of more than 80% defines a negative change in PCT result representing a lower risk for 28-day all-cause mortality of patients diagnosed with severe sepsis or septic shock.      URINALYSIS W/ MICROSCOPIC IF INDICATED (NO CULTURE) - Abnormal; Notable for the following components:    Glucose, UA >=1000 mg/dL (3+) (*)     Ketones, UA Trace (*)     All other " components within normal limits    Narrative:     Urine microscopic not indicated.   CBC WITH AUTO DIFFERENTIAL - Abnormal; Notable for the following components:    RDW 11.7 (*)     Neutrophil % 82.7 (*)     Lymphocyte % 6.3 (*)     Eosinophil % 0.0 (*)     Neutrophils, Absolute 7.93 (*)     Lymphocytes, Absolute 0.60 (*)     Monocytes, Absolute 0.98 (*)     All other components within normal limits   POCT GLUCOSE FINGERSTICK - Abnormal; Notable for the following components:    Glucose 334 (*)     All other components within normal limits   RESPIRATORY PANEL PCR W/ COVID-19 (SARS-COV-2), NP SWAB IN UTM/VTP, 2 HR TAT - Normal    Narrative:     In the setting of a positive respiratory panel with a viral infection PLUS a negative procalcitonin without other underlying concern for bacterial infection, consider observing off antibiotics or discontinuation of antibiotics and continue supportive care. If the respiratory panel is positive for atypical bacterial infection (Bordetella pertussis, Chlamydophila pneumoniae, or Mycoplasma pneumoniae), consider antibiotic de-escalation to target atypical bacterial infection.   BLOOD CULTURE   BLOOD CULTURE   LACTIC ACID, REFLEX   CBC AND DIFFERENTIAL    Narrative:     The following orders were created for panel order CBC & Differential.  Procedure                               Abnormality         Status                     ---------                               -----------         ------                     CBC Auto Differential[[id number]]        Abnormal            Final result                 Please view results for these tests on the individual orders.       EKG:   No orders to display       Meds given in ED:   Medications   acetaminophen (TYLENOL) tablet 1,000 mg (1,000 mg Oral Not Given 3/21/24 1518)   acetaminophen (TYLENOL) tablet 650 mg (has no administration in time range)   ondansetron ODT (ZOFRAN-ODT) disintegrating tablet 4 mg (has no administration in time range)      Or   ondansetron (ZOFRAN) injection 4 mg (has no administration in time range)   melatonin tablet 3 mg (has no administration in time range)   sennosides-docusate (PERICOLACE) 8.6-50 MG per tablet 2 tablet (has no administration in time range)     And   polyethylene glycol (MIRALAX) packet 17 g (has no administration in time range)     And   bisacodyl (DULCOLAX) EC tablet 5 mg (has no administration in time range)     And   bisacodyl (DULCOLAX) suppository 10 mg (has no administration in time range)   sepsis fluid NS 0.9 % bolus 1,944 mL (1,944 mL Intravenous New Bag 3/21/24 1457)   acetaminophen (TYLENOL) suppository 650 mg (650 mg Rectal Given 3/21/24 1518)   ceFAZolin 1000 mg IVPB in 100 mL NS (MBP) (1,000 mg Intravenous New Bag 3/21/24 1633)       Imaging results:  No radiology results for the last day    Ambulatory status:   - bedrest    Social issues:   Social History     Socioeconomic History    Marital status: Single    Number of children: 1    Years of education: High School   Tobacco Use    Smoking status: Never    Smokeless tobacco: Never   Vaping Use    Vaping status: Never Used   Substance and Sexual Activity    Alcohol use: No    Drug use: No    Sexual activity: Defer       Peripheral Neurovascular  Peripheral Neurovascular (Adult)  Peripheral Neurovascular WDL: .WDL except, neurovascular assessment lower  LLE Neurovascular Assessment  Temperature LLE: warm  Color LLE: no discoloration  Sensation LLE: numbness present, tingling present  RLE Neurovascular Assessment  Temperature RLE: warm  Color RLE: no discoloration  Sensation RLE: numbness present, tingling present    Neuro Cognitive  Neuro Cognitive (Adult)  Cognitive/Neuro/Behavioral WDL: .WDL except, orientation  Orientation: time, disoriented to    Learning  Learning Assessment (Adult)  Learning Readiness and Ability: cognitive limitation noted (Change from baseline.)    Respiratory  Respiratory WDL  Respiratory WDL: WDL    Abdominal Pain  Safety  Interventions  Safety Precautions/Falls Reduction: assistive device/personal items within reach, family at bedside, fall reduction program maintained  All Alarms: none present    Pain Assessments  Pain (Adult)  (0-10) Pain Rating: Rest: 0  (0-10) Pain Rating: Activity: 0  Response to Pain Interventions: interventions effective per patient    NIH Stroke Scale       Vinicius Ramirez RN  03/21/24 17:15 EDT      Electronically signed by Vinicius Ramirez RN at 03/21/24 1718       Jeff Riley MD at 03/21/24 1441           EMERGENCY DEPARTMENT ENCOUNTER  Room Number:  27/27  PCP: Donato Vilchis DO  Independent Historians: Patient, EMS who brought patient      HPI:  Chief Complaint: had concerns including Altered Mental Status.     A complete HPI/ROS/PMH/PSH/SH/FH are unobtainable due to: Confusion, altered mental status  Chronic or social conditions impacting patient care (Social Determinants of Health):       Context: Roxanna Beck is a 58 y.o. female with a medical history of diabetes, hypertension and hyperlipidemia who presents to the ED c/o acute confusion.  Patient states she started today with nausea and vomiting has not kept a light down today.  Denies fever at home but was febrile with EMS with reported temperature of 103.2.  Patient denies chest pain or trouble breathing.  She denies abdominal pain.  She denies diarrhea.  She states she has been treated for a toe infection and has been followed by Dr. Zapata as outpatient.      Review of prior external notes (non-ED) -and- Review of prior external test results outside of this encounter:   I reviewed prior medical records note patient was hospitalized here about 1 month ago with diabetic ulcer of the right great toe.      Prescription drug monitoring program review:         PAST MEDICAL HISTORY  Active Ambulatory Problems     Diagnosis Date Noted    Lumbar disc herniation 12/15/2016    Migraine 12/29/2021    Hyperglycemia 02/23/2024    Diabetic  ulcer of right great toe 2024    Diabetic peripheral neuropathy 2020    Generalized anxiety disorder 2020    Insulin dependent type 2 diabetes mellitus 2021    Migraine headache 2020    Hypertension 2021    Hyperglycemia due to diabetes mellitus 2024     Resolved Ambulatory Problems     Diagnosis Date Noted    Lactic acidosis 2024     Past Medical History:   Diagnosis Date    Arthritis     Depression     Diabetes mellitus     Dizzy     Fibromyalgia     GERD (gastroesophageal reflux disease)     History of kidney stones     HNP (herniated nucleus pulposus)     Hyperlipidemia     Wears glasses          PAST SURGICAL HISTORY  Past Surgical History:   Procedure Laterality Date    BACK SURGERY      L4-5 Discectomy    CARPAL TUNNEL RELEASE      left     SECTION      CHOLECYSTECTOMY      COLONOSCOPY      ENDOSCOPY      HYSTERECTOMY      KIDNEY STONE SURGERY      LUMBAR DISCECTOMY N/A 12/15/2016    Procedure: LUMBAR DISCECTOMY L3-4;  Surgeon: Sandro Cuenca MD;  [facility]  Service:     OOPHORECTOMY      TONSILLECTOMY      WISDOM TOOTH EXTRACTION           FAMILY HISTORY  Family History   Problem Relation Age of Onset    Lung cancer Mother     Cancer Mother     Heart attack Father     Heart disease Father     Diabetes Sister     Hypertension Sister     Breast cancer Sister 70    Diabetes Brother     Hypertension Brother     Heart disease Paternal Grandmother     Heart disease Paternal Grandfather     Breast cancer Cousin         early 50's    Breast cancer Maternal Aunt         50's    Breast cancer Maternal Aunt         50's    Breast cancer Maternal Aunt         50's    Breast cancer Maternal Aunt         50's    Breast cancer Maternal Aunt         50's    Endometrial cancer Neg Hx     Ovarian cancer Neg Hx          SOCIAL HISTORY  Social History     Socioeconomic History    Marital status: Single    Number of children: 1    Years of education: High  School   Tobacco Use    Smoking status: Never    Smokeless tobacco: Never   Vaping Use    Vaping status: Never Used   Substance and Sexual Activity    Alcohol use: No    Drug use: No    Sexual activity: Defer         ALLERGIES  Codeine, Percocet [oxycodone-acetaminophen], Adhesive tape, and Toradol [ketorolac tromethamine]      REVIEW OF SYSTEMS  Review of Systems   Constitutional:  Positive for fatigue and fever.   Respiratory:  Negative for shortness of breath.    Cardiovascular:  Negative for chest pain.   Gastrointestinal:  Positive for nausea and vomiting.   Skin:         Toe ulceration on the right great toe   All other systems reviewed and are negative.    Included in HPI  All systems reviewed and negative except for those discussed in HPI.      PHYSICAL EXAM    I have reviewed the triage vital signs and nursing notes.    ED Triage Vitals   Temp Pulse Resp BP SpO2   -- -- -- -- --      Temp src Heart Rate Source Patient Position BP Location FiO2 (%)   -- -- -- -- --       Physical Exam  GENERAL: Alert female in mild distress.  Triage vitals notable for temperature of 103 with EMS.  She is tachycardic to the 114 range  SKIN: Warm, dry examination of the right foot reveals a roughly 0.5 x 0.5 cm ulcer of the right great toe without significant odor or erythema.  HENT: Normocephalic, atraumatic  EYES: no scleral icterus  CV: Tachycardic without murmur  RESPIRATORY: normal effort, lungs clear-O2 sats upper 90s room air  ABDOMEN: soft, nontender, mildly obese  MUSCULOSKELETAL: no deformity  NEURO: alert, moves all extremities, follows commands      LAB RESULTS  Recent Results (from the past 24 hour(s))   POC Glucose Once    Collection Time: 03/21/24  2:47 PM    Specimen: Blood   Result Value Ref Range    Glucose 334 (H) 70 - 130 mg/dL   Respiratory Panel PCR w/COVID-19(SARS-CoV-2) KEERTHI/BEAU/CRISTIANO/[facility] In-House, NP Swab in UTM/VTM, 2 HR TAT - Swab, Nasopharynx    Collection Time: 03/21/24  2:56 PM    Specimen:  Nasopharynx; Swab   Result Value Ref Range    ADENOVIRUS, PCR Not Detected Not Detected    Coronavirus 229E Not Detected Not Detected    Coronavirus HKU1 Not Detected Not Detected    Coronavirus NL63 Not Detected Not Detected    Coronavirus OC43 Not Detected Not Detected    COVID19 Not Detected Not Detected - Ref. Range    Human Metapneumovirus Not Detected Not Detected    Human Rhinovirus/Enterovirus Not Detected Not Detected    Influenza A PCR Not Detected Not Detected    Influenza B PCR Not Detected Not Detected    Parainfluenza Virus 1 Not Detected Not Detected    Parainfluenza Virus 2 Not Detected Not Detected    Parainfluenza Virus 3 Not Detected Not Detected    Parainfluenza Virus 4 Not Detected Not Detected    RSV, PCR Not Detected Not Detected    Bordetella pertussis pcr Not Detected Not Detected    Bordetella parapertussis PCR Not Detected Not Detected    Chlamydophila pneumoniae PCR Not Detected Not Detected    Mycoplasma pneumo by PCR Not Detected Not Detected   Urinalysis With Microscopic If Indicated (No Culture) - Straight Cath    Collection Time: 03/21/24  3:22 PM    Specimen: Straight Cath; Urine   Result Value Ref Range    Color, UA Yellow Yellow, Straw    Appearance, UA Clear Clear    pH, UA 6.5 5.0 - 8.0    Specific Gravity, UA >=1.030 1.005 - 1.030    Glucose, UA >=1000 mg/dL (3+) (A) Negative    Ketones, UA Trace (A) Negative    Bilirubin, UA Negative Negative    Blood, UA Negative Negative    Protein, UA Negative Negative    Leuk Esterase, UA Negative Negative    Nitrite, UA Negative Negative    Urobilinogen, UA 0.2 E.U./dL 0.2 - 1.0 E.U./dL   Comprehensive Metabolic Panel    Collection Time: 03/21/24  3:27 PM    Specimen: Blood   Result Value Ref Range    Glucose 322 (H) 65 - 99 mg/dL    BUN 22 (H) 6 - 20 mg/dL    Creatinine 0.93 0.57 - 1.00 mg/dL    Sodium 132 (L) 136 - 145 mmol/L    Potassium 4.8 3.5 - 5.2 mmol/L    Chloride 95 (L) 98 - 107 mmol/L    CO2 23.8 22.0 - 29.0 mmol/L    Calcium  8.6 8.6 - 10.5 mg/dL    Total Protein 6.6 6.0 - 8.5 g/dL    Albumin 3.5 3.5 - 5.2 g/dL    ALT (SGPT) 35 (H) 1 - 33 U/L    AST (SGOT) 27 1 - 32 U/L    Alkaline Phosphatase 113 39 - 117 U/L    Total Bilirubin 0.5 0.0 - 1.2 mg/dL    Globulin 3.1 gm/dL    A/G Ratio 1.1 g/dL    BUN/Creatinine Ratio 23.7 7.0 - 25.0    Anion Gap 13.2 5.0 - 15.0 mmol/L    eGFR 70.9 >60.0 mL/min/1.73   Lactic Acid, Plasma    Collection Time: 03/21/24  3:27 PM    Specimen: Blood   Result Value Ref Range    Lactate 2.4 (C) 0.5 - 2.0 mmol/L   Procalcitonin    Collection Time: 03/21/24  3:27 PM    Specimen: Blood   Result Value Ref Range    Procalcitonin 0.27 (H) 0.00 - 0.25 ng/mL   CBC Auto Differential    Collection Time: 03/21/24  3:27 PM    Specimen: Blood   Result Value Ref Range    WBC 9.59 3.40 - 10.80 10*3/mm3    RBC 4.15 3.77 - 5.28 10*6/mm3    Hemoglobin 12.2 12.0 - 15.9 g/dL    Hematocrit 37.1 34.0 - 46.6 %    MCV 89.4 79.0 - 97.0 fL    MCH 29.4 26.6 - 33.0 pg    MCHC 32.9 31.5 - 35.7 g/dL    RDW 11.7 (L) 12.3 - 15.4 %    RDW-SD 38.2 37.0 - 54.0 fl    MPV 9.7 6.0 - 12.0 fL    Platelets 147 140 - 450 10*3/mm3    Neutrophil % 82.7 (H) 42.7 - 76.0 %    Lymphocyte % 6.3 (L) 19.6 - 45.3 %    Monocyte % 10.2 5.0 - 12.0 %    Eosinophil % 0.0 (L) 0.3 - 6.2 %    Basophil % 0.2 0.0 - 1.5 %    Neutrophils, Absolute 7.93 (H) 1.70 - 7.00 10*3/mm3    Lymphocytes, Absolute 0.60 (L) 0.70 - 3.10 10*3/mm3    Monocytes, Absolute 0.98 (H) 0.10 - 0.90 10*3/mm3    Eosinophils, Absolute 0.00 0.00 - 0.40 10*3/mm3    Basophils, Absolute 0.02 0.00 - 0.20 10*3/mm3         RADIOLOGY  XR Chest 1 View    Result Date: 3/21/2024  XR CHEST 1 VW-  Clinical: Fever  COMPARISON 12/24/2021  FINDINGS: Cardiac size within normal limits. No mediastinal or hilar abnormality. Lungs are clear. No effusion or edema seen.  CONCLUSION: No active disease of the chest  This report was finalized on 3/21/2024 3:54 PM by Dr. Reji Keen M.D on Workstation: [id number]          MEDICATIONS GIVEN IN ER  Medications   acetaminophen (TYLENOL) tablet 1,000 mg (1,000 mg Oral Not Given 3/21/24 1518)   ceFAZolin 1000 mg IVPB in 100 mL NS (MBP) (1,000 mg Intravenous New Bag 3/21/24 1633)   sepsis fluid NS 0.9 % bolus 1,944 mL (1,944 mL Intravenous New Bag 3/21/24 1457)   acetaminophen (TYLENOL) suppository 650 mg (650 mg Rectal Given 3/21/24 1518)         ORDERS PLACED DURING THIS VISIT:  Orders Placed This Encounter   Procedures    Blood Culture - Blood,    Blood Culture - Blood,    Respiratory Panel PCR w/COVID-19(SARS-CoV-2) [facility]I/[facility]AU/CRISTIANO/[facility] In-House, NP Swab in UTM/VTM, 2 HR TAT - Swab, Nasopharynx    XR Chest 1 View    Comprehensive Metabolic Panel    Lactic Acid, Plasma    Procalcitonin    Urinalysis With Microscopic If Indicated (No Culture) - Urine, Clean Catch    CBC Auto Differential    STAT Lactic Acid, Reflex    [facility] (on-call MD unless specified) Details    POC Glucose Once    Inpatient Admission    CBC & Differential         OUTPATIENT MEDICATION MANAGEMENT:  Current Facility-Administered Medications Ordered in Epic   Medication Dose Route Frequency Provider Last Rate Last Admin    acetaminophen (TYLENOL) tablet 1,000 mg  1,000 mg Oral Once Osvaldo, Jeff DINH MD        ceFAZolin 1000 mg IVPB in 100 mL NS (MBP)  1,000 mg Intravenous Once Osvaldo, Jeff DINH MD   1,000 mg at 03/21/24 1633     Current Outpatient Medications Ordered in Epic   Medication Sig Dispense Refill    amitriptyline (ELAVIL) 25 MG tablet Take 3 tablets by mouth Every Night. 90 tablet 0    baclofen (LIORESAL) 10 MG tablet Take 1 tablet by mouth 2 (Two) Times a Day. 60 tablet 0    cetirizine (ZyrTEC) 10 MG tablet Take 1 tablet by mouth Daily As Needed for Allergies.      DULoxetine (CYMBALTA) 30 MG capsule Take 2 capsules by mouth Daily.      gabapentin (NEURONTIN) 300 MG capsule Take 1 capsule by mouth At Night As Needed (For pain).      glipiZIDE (GLUCOTROL) 10 MG tablet Take 1 tablet by mouth 2 (Two) Times  a Day Before Meals.      hydroCHLOROthiazide 25 MG tablet Take 1 tablet by mouth Daily.      Insulin Glargine-Lixisenatide (Soliqua) 100-33 UNT-MCG/ML solution pen-injector injection Inject 50 Units under the skin into the appropriate area as directed Daily.      Insulin Lispro, 1 Unit Dial, (HumaLOG KwikPen) 100 UNIT/ML solution pen-injector Inject 5 Units under the skin into the appropriate area as directed 3 (Three) Times a Day With Meals 15 mL 0    Insulin Pen Needle (Pen Needles) 32G X 4 MM misc Inject 1 each under the skin into the appropriate area as directed 4 (Four) Times a Day As Needed (for insulin injections). Formulary Compliance Approval 100 each 12    lisinopril (PRINIVIL,ZESTRIL) 10 MG tablet Take 2 tablets by mouth Daily.      meloxicam (MOBIC) 15 MG tablet Take 1 tablet by mouth Daily.      METFORMIN HCL PO Take 1,000 mg by mouth 2 (two) times a day         omeprazole (PriLOSEC) 40 MG capsule Take 1 capsule by mouth Daily.      rosuvastatin (CRESTOR) 10 MG tablet Take 1 tablet by mouth Daily.           PROCEDURES  Procedures            PROGRESS, DATA ANALYSIS, CONSULTS, AND MEDICAL DECISION MAKING  All labs have been independently interpreted by me.  All radiology studies have been reviewed by me. All EKG's have been independently viewed and interpreted by me.  Discussion below represents my analysis of pertinent findings related to patient's condition, differential diagnosis, treatment plan and final disposition.    Differential diagnosis includes but is not limited to underlying infection of unclear cause.  Consider skin infection including right toe ulceration.  Consider upper respiratory infection both viral and bacterial.  Consider intra-abdominal infection in a patient with nausea and vomiting.  Consider dehydration and electrolyte disturbance and possible DKA and a known diabetic with fever tachycardia and vomiting..      ED Course as of 03/21/24 1649   Thu Mar 21, 2024   0425 Urinalysis  reviewed is unremarkable and not suggestive of acute infection.  There is noted greater than 1000 glucose. [DB]   1556 Chest x-ray independently interpreted by me shows no obvious acute disease. [DB]   1620 Viral panel negative for tested pathogens. [DB]   1620 Chemistries reviewed notable for elevated glucose of 322.  Bicarb is 24 which would go against DKA or significant acidosis. [DB]   1620 Procalcitonin elevated at 0.27 and elevated lactic acid 2.4 increases concern for possible underlying bacterial illness.  Blood cultures are pending. [DB]   1621 As urinalysis and chest x-ray are also pretty benign I do not see clear evidence of cause of this high fever.  Certainly I would be worried about possible foot infection and could be osteomyelitis.  Will admit to the hospital for further evaluation and treatment of fever and complicated patient with unclear source. [DB]   1622  is now present at bedside and states that patient is much more coherent.  She was much more confused earlier today.    As I enter the room to reassess she is talking on the phone with heart rate just over 100.  O2 sats and blood pressure are benign. [DB]   1625 As I do not see other clear source, the most likely cause may well be from diabetic foot infection.  Will go ahead and cover with IV Kefzol pending further workup and evaluation. [DB]   1648 I discussed evaluation treatment of this patient with Dr. Ruddy Roche who admit on behalf of [facility]. [DB]      ED Course User Index  [DB] Jeff Riley MD             AS OF 16:49 EDT VITALS:    BP - (!) 158/105  HR - 104  TEMP - 99.8 °F (37.7 °C) (Tympanic)  O2 SATS - 96%    COMPLEXITY OF CARE  Complicated patient with multiple comorbidities presents with fever greater than 103 as well as vomiting and tachycardia.  She is also had altered mental status.    Initial focus of infection unclear with initial exam.  Will give 30/kg IV fluid bolus and initiate ED workup.      DIAGNOSIS  Final  diagnoses:   Acute metabolic encephalopathy   Diabetic foot infection         DISPOSITION  ED Disposition       ED Disposition   Decision to Admit    Condition   --    Comment   Level of Care: Telemetry [5]   Diagnosis: Acute metabolic encephalopathy [[id number]]   Admitting Physician: RAYMUNDO PACKER [[id number]]   Attending Physician: RAYMUNDO PACKER [[id number]]   Certification: I Certify That Inpatient Hospital Services Are Medically Necessary For Greater Than 2 Midnights                  Please note that portions of this document were completed with a voice recognition program.    Note Disclaimer: At [facility], we believe that sharing information builds trust and better relationships. You are receiving this note because you recently visited [facility]. It is possible you will see health information before a provider has talked with you about it. This kind of information can be easy to misunderstand. To help you fully understand what it means for your health, we urge you to discuss this note with your provider.         Jeff Riley MD  03/21/24 1649      Electronically signed by Jeff Riley MD at 03/21/24 1649       Asia Estrada RN at 03/21/24 1435          Pt arrives via EMS from home; called for AMS, normally Aox4, Aox1 at time of contact, would answer some questions, or answer wrong. Fever of 103.2 orally, tachypnea, episodes of incontinence 2-3 times, unable to walk since last night.  notified change in behavior at 2130 last night. Son lives at home and has recently had the flu. Has not taken any regular medication. One episode of vomiting once arrived.     Electronically signed by Asia Estrada RN at 03/21/24 1438       Vital Signs (last 2 days)       Date/Time Temp Temp src Pulse Resp BP Patient Position SpO2    03/22/24 1328 97.7 (36.5) Oral 92 18 105/69 Lying 100    03/22/24 1055 98.8 (37.1) Oral -- -- -- -- --    03/22/24 0935 99.3 (37.4) Oral -- -- -- -- --    03/22/24  0750 103.1 (39.5) Oral 115 18 153/79 Lying 93    03/22/24 0130 99.7 (37.6) Oral -- -- -- -- --    03/22/24 0100 102.4 (39.1) Oral -- -- -- -- --    03/21/24 2337 102.7 (39.3) Oral 108 18 156/79 Lying 97    03/21/24 1900 98.8 (37.1) Oral 93 18 121/64 Lying --    03/21/24 1804 -- -- 100 20 147/84 -- 93    03/21/24 1736 -- -- 98 -- 141/77 -- 94    03/21/24 1706 -- -- 103 -- 138/84 -- 97    03/21/24 16:30:05 99.8 (37.7) Tympanic -- -- -- -- --    03/21/24 1606 -- -- 104 20 158/105 -- 96    03/21/24 1603 -- -- 104 -- 146/85 -- 94    03/21/24 1542 -- -- 105 20 -- -- 92    03/21/24 1504 -- -- 110 -- 155/89 -- 95    03/21/24 1501 -- -- 112 -- -- -- 95    03/21/24 1459 101.2 (38.4) Tympanic -- 20 -- -- --          Oxygen Therapy (last 2 days)       Date/Time SpO2 Device (Oxygen Therapy) Flow (L/min) Oxygen Concentration (%) ETCO2 (mmHg)    03/22/24 1328 100 room air -- -- --    03/22/24 0815 -- room air -- -- --    03/22/24 0750 93 nasal cannula 2 -- --    03/21/24 2337 97 -- -- -- --    03/21/24 1804 93 -- -- -- --    03/21/24 1736 94 -- -- -- --    03/21/24 1706 97 -- -- -- --    03/21/24 1606 96 -- -- -- --    03/21/24 1603 94 -- -- -- --    03/21/24 1542 92 -- -- -- --    03/21/24 1504 95 -- -- -- --    03/21/24 1501 95 -- -- -- --          Intake & Output (last 2 days)         03/20 0701 03/21 0700 03/21 0701 03/22 0700 03/22 0701 03/23 0700    IV Piggyback  100     Total Intake(mL/kg)  100 (1.2)     Urine (mL/kg/hr)  650 800 (1.2)    Stool  0     Total Output  650 800    Net  -550 -800           Urine Unmeasured Occurrence  1 x 1 x    Stool Unmeasured Occurrence  1 x           Lines, Drains & Airways       Active LDAs       Name Placement date Placement time Site Days    Peripheral IV 03/21/24 1439 Left Antecubital 03/21/24  1439  Antecubital  1    External Urinary Catheter 03/21/24  1524  --  less than 1             Medication Administration Report for Roxanna Beck as of 3/21/24 through 3/22/24     Legend:     Given Hold Not Given Due Canceled Entry Other Actions    Time Time (Time) Time Time-Action         Discontinued     Completed     Future     MAR Hold     Linked             Medications 03/21/24 03/22/24     acetaminophen (TYLENOL) tablet 1,000 mg  Dose: 1,000 mg  Freq: Once Route: PO  Start: 03/21/24 1503     Admin Instructions:   If given for fever, use fever parameter: fever greater than 100.4 °F  Based on patient request - if ordered for moderate or severe pain, provider allows for administration of a medication prescribed for a lower pain scale.    Do not exceed 4 grams of acetaminophen in a 24 hr period. Max dose of 2gm for AST/ALT greater than 120 units/L.    If given for pain, use the following pain scale:   Mild Pain = Pain Score of 1-3, CPOT 1-2  Moderate Pain = Pain Score of 4-6, CPOT 3-4  Severe Pain = Pain Score of 7-10, CPOT 5-8      (1518)-Not Given               acetaminophen (TYLENOL) tablet 650 mg  Dose: 650 mg  Freq: Every 4 Hours PRN Route: PO  PRN Reason: Mild Pain  Start: 03/21/24 1656     Admin Instructions:   Do not exceed 4 grams of acetaminophen in a 24 hr period.    If given for pain, use the following pain scale:   Mild Pain = Pain Score of 1-3, CPOT 1-2  Moderate Pain = Pain Score of 4-6, CPOT 3-4  Severe Pain = Pain Score of 7-10, CPOT 5-8  Based on patient request - if ordered for moderate or severe pain, provider allows for administration of a medication prescribed for a lower pain scale.    Do not exceed 4 grams of acetaminophen in a 24 hr period. Max dose of 2gm for AST/ALT greater than 120 units/L.    If given for pain, use the following pain scale:   Mild Pain = Pain Score of 1-3, CPOT 1-2  Moderate Pain = Pain Score of 4-6, CPOT 3-4  Severe Pain = Pain Score of 7-10, CPOT 5-8      7570-Given            0758-Given               sennosides-docusate (PERICOLACE) 8.6-50 MG per tablet 2 tablet  Dose: 2 tablet  Freq: 2 Times Daily PRN Route: PO  PRN Reason: Constipation  Start: 03/21/24  1656     Admin Instructions:   Start bowel management regimen if patient has not had a bowel movement after 12 hours.          And   polyethylene glycol (MIRALAX) packet 17 g  Dose: 17 g  Freq: Daily PRN Route: PO  PRN Reason: Constipation  PRN Comment: Use if senna-docusate is ineffective  Start: 03/21/24 1656     Admin Instructions:   Use if no bowel movement after 12 hours. Mix in 6-8 ounces of water.  Use 4-8 ounces of water, tea, or juice for each 17 gram dose.          And   bisacodyl (DULCOLAX) EC tablet 5 mg  Dose: 5 mg  Freq: Daily PRN Route: PO  PRN Reason: Constipation  PRN Comment: Use if polyethylene glycol is ineffective  Start: 03/21/24 1656     Admin Instructions:   Use if no bowel movement after 12 hours.  Swallow whole. Do not crush, split, or chew tablet.          And   bisacodyl (DULCOLAX) suppository 10 mg  Dose: 10 mg  Freq: Daily PRN Route: RE  PRN Reason: Constipation  PRN Comment: Use if bisacodyl oral is ineffective  Start: 03/21/24 1656     Admin Instructions:   Use if no bowel movement after 12 hours.  Hold for diarrhea          cefTRIAXone (ROCEPHIN) 2,000 mg in sodium chloride 0.9 % 100 mL MBP  Dose: 2,000 mg  Freq: Every 24 Hours Route: IV  Indications of Use: SEPSIS  Start: 03/22/24 0900 End: 03/27/24 0859     Admin Instructions:   LR should be paused and flushing of the line with NS is recommended prior to and after completion of ceftriaxone infusion due to incompatibility. Do not co-adminster with calcium-containing solutions.  Caution: Look alike/sound alike drug alert       0828-New Bag              dextrose (D50W) (25 g/50 mL) IV injection 25 g  Dose: 25 g  Freq: Every 15 Minutes PRN Route: IV  PRN Reason: Low Blood Sugar  PRN Comment: Blood Sugar Less Than 70  Start: 03/21/24 2028     Admin Instructions:   Blood sugar less than 70; patient has IV access - Unresponsive, NPO or Unable To Safely Swallow          dextrose (GLUTOSE) oral gel 15 g  Dose: 15 g  Freq: Every 15 Minutes  PRN Route: PO  PRN Reason: Low Blood Sugar  PRN Comment: Blood sugar less than 70  Start: 03/21/24 2028     Admin Instructions:   BS<70, Patient Alert, Is not NPO, Can safely swallow.          DULoxetine (CYMBALTA) DR capsule 60 mg  Dose: 60 mg  Freq: Daily Route: PO  Start: 03/22/24 0900     Admin Instructions:   Do not crush or chew the capsules or tablets. The drug may not work as designed if the capsule or tablet is crushed or chewed. Swallow whole.  Caution: Look alike/sound alike drug alert. Capsule may be opened and sprinkled on applesauce or apple juice. Do not crush or chew capsule.       0822-Given              glucagon (GLUCAGEN) injection 1 mg  Dose: 1 mg  Freq: Every 15 Minutes PRN Route: IM  PRN Reason: Low Blood Sugar  PRN Comment: Blood Glucose Less Than 70  Start: 03/21/24 2028     Admin Instructions:   Blood Glucose Less Than 70 - Patient Without IV Access - Unresponsive, NPO or Unable To Safely Swallow  Reconstitute powder for injection by adding 1 mL of -supplied sterile diluent or sterile water for injection to a vial containing 1 mg of the drug, to provide solutions containing 1 mg/mL. Shake vial gently to dissolve.          insulin glargine (LANTUS, SEMGLEE) injection 50 Units  Dose: 50 Units  Freq: Daily Route: SC  Start: 03/22/24 0900     Admin Instructions:   Do not hold basal insulin without an order. Consider requesting a dose edit, if needed.           0826-Given              insulin lispro (HUMALOG/ADMELOG) injection 2-7 Units  Dose: 2-7 Units  Freq: 4 Times Daily Before Meals & Nightly Route: SC  Start: 03/21/24 2115     Admin Instructions:   Correction Insulin - Low Dose - Total Insulin Dose Less Than 40 units/day (Lean, Elderly or Renal Patients)    Blood Glucose 150-199 mg/dL - 2 units  Blood Glucose 200-249 mg/dL - 3 units  Blood Glucose 250-299 mg/dL - 4 units  Blood Glucose 300-349 mg/dL - 5 units  Blood Glucose 350-400 mg/dL - 6 units  Blood Glucose Greater Than 400  mg/dL - 7 units & Call Provider     Caution: Look alike/sound alike drug alert        2113-Given            (0631)-Not Given     1248-Given     1730     2100           insulin lispro (HUMALOG/ADMELOG) injection 5 Units  Dose: 5 Units  Freq: 3 Times Daily Before Meals Route: SC  Start: 03/22/24 0730     Admin Instructions:      Caution: Look alike/sound alike drug alert         0824-Given     1248-Given     1730            lisinopril (PRINIVIL,ZESTRIL) tablet 20 mg  Dose: 20 mg  Freq: Daily Route: PO  Start: 03/22/24 0900     Admin Instructions:   Hold for SBP less than 100, DBP less than 60       0823-Given              melatonin tablet 3 mg  Dose: 3 mg  Freq: Nightly PRN Route: PO  PRN Reason: Sleep  Start: 03/21/24 1656           ondansetron ODT (ZOFRAN-ODT) disintegrating tablet 4 mg  Dose: 4 mg  Freq: Every 6 Hours PRN Route: PO  PRN Reasons: Nausea,Vomiting  Start: 03/21/24 1656     Admin Instructions:   If BOTH ondansetron (ZOFRAN) and promethazine (PHENERGAN) are ordered use ondansetron first and THEN promethazine IF ondansetron is ineffective.  Place on tongue and allow to dissolve.       0023-Not Given:  See Alt              Or   ondansetron (ZOFRAN) injection 4 mg  Dose: 4 mg  Freq: Every 6 Hours PRN Route: IV  PRN Reasons: Nausea,Vomiting  Start: 03/21/24 1656       0023-Given              pantoprazole (PROTONIX) EC tablet 40 mg  Dose: 40 mg  Freq: Every Early Morning Route: PO  Start: 03/22/24 0600     Admin Instructions:   Swallow whole; do not crush, split, or chew.       0613-Given              Pharmacy to dose vancomycin  Freq: Continuous PRN Route: XX  PRN Reason: Consult  Indications of Use: SEPSIS,SKIN AND SOFT TISSUE INFECTION  Start: 03/22/24 1115 End: 04/01/24 1114          rosuvastatin (CRESTOR) tablet 10 mg  Dose: 10 mg  Freq: Daily Route: PO  Start: 03/22/24 0900     Admin Instructions:   Avoid grapefruit juice.       0822-Given              sodium chloride 0.9 % infusion  Rate: 75 mL/hr  Dose: 75 mL/hr  Freq: Continuous Route: IV  Start: 03/21/24 2130 2113-New Bag               Future Medications  Medications 03/21/24 03/22/24      vancomycin (VANCOCIN) 1,000 mg in sodium chloride 0.9 % 250 mL IVPB-VTB  Dose: 1,000 mg  Freq: Every 12 Hours Route: IV  Indications of Use: SEPSIS,SKIN AND SOFT TISSUE INFECTION  Start: 03/22/24 2300 End: 04/01/24 1059       2300              Completed Medications  Medications 03/21/24 03/22/24      acetaminophen (TYLENOL) suppository 650 mg  Dose: 650 mg  Freq: Once Route: RE  Start: 03/21/24 1506 End: 03/21/24 1518     Admin Instructions:   If given for fever, use fever parameter: fever greater than 100.4 °F  Based on patient request - if ordered for moderate or severe pain, provider allows for administration of a medication prescribed for a lower pain scale.    Do not exceed 4 grams of acetaminophen in a 24 hr period. Max dose of 2gm for AST/ALT greater than 120 units/L.    If given for pain, use the following pain scale:   Mild Pain = Pain Score of 1-3, CPOT 1-2  Moderate Pain = Pain Score of 4-6, CPOT 3-4  Severe Pain = Pain Score of 7-10, CPOT 5-8      1518-Given               ceFAZolin 1000 mg IVPB in 100 mL NS (MBP)  Dose: 1,000 mg  Freq: Once Route: IV  Indications of Use: SKIN AND SOFT TISSUE INFECTION  Start: 03/21/24 1641 End: 03/21/24 1703     Admin Instructions:   Caution: Look alike/sound alike drug alert      1633-New Bag     1703-Stopped              sepsis fluid NS 0.9 % bolus 1,944 mL  Dose: 30 mL/kg  Weight Dosing Info: 64.8 kg (Adjusted)  Freq: Once Route: IV  Start: 03/21/24 1503 End: 03/21/24 1557     Admin Instructions:   Volume of This Order May Need to Be Adjusted to Account For Fluids Already Given.  Calculated Total Bolus Volume - 1,943.4 mL      1457-New Bag               vancomycin 1750 mg/500 mL 0.9% NS IVPB ([facility])  Dose: 20 mg/kg  Weight Dosing Info: 81.9 kg  Freq: Once Route: IV  Indications of Use: SEPSIS,SKIN AND SOFT TISSUE  INFECTION  Start: 03/22/24 1215 End: 03/22/24 1430       1245-New Bag                     Lab Results (last 48 hours)       Procedure Component Value Units Date/Time    POC Glucose Once [[id number]]  (Abnormal) Collected: 03/22/24 1124    Specimen: Blood Updated: 03/22/24 1125     Glucose 293 mg/dL     C-reactive Protein [[id number]]  (Abnormal) Collected: 03/22/24 0953    Specimen: Blood Updated: 03/22/24 1031     C-Reactive Protein 13.59 mg/dL     Hemoglobin A1c [[id number]]  (Abnormal) Collected: 03/22/24 0953    Specimen: Blood Updated: 03/22/24 1030     Hemoglobin A1C 11.20 %     Narrative:      Hemoglobin A1C Ranges:    Increased Risk for Diabetes  5.7% to 6.4%  Diabetes                     >= 6.5%  Diabetic Goal                < 7.0%    Sedimentation Rate [[id number]]  (Normal) Collected: 03/22/24 0953    Specimen: Blood Updated: 03/22/24 1028     Sed Rate 28 mm/hr     POC Glucose Once [[id number]]  (Normal) Collected: 03/22/24 0627    Specimen: Blood Updated: 03/22/24 0629     Glucose 128 mg/dL     Basic Metabolic Panel [[id number]]  (Abnormal) Collected: 03/22/24 0348    Specimen: Blood Updated: 03/22/24 0519     Glucose 181 mg/dL      BUN 19 mg/dL      Creatinine 0.73 mg/dL      Sodium 134 mmol/L      Potassium 3.8 mmol/L      Chloride 98 mmol/L      CO2 21.4 mmol/L      Calcium 8.5 mg/dL      BUN/Creatinine Ratio 26.0     Anion Gap 14.6 mmol/L      eGFR 94.9 mL/min/1.73     Narrative:      GFR Normal >60  Chronic Kidney Disease <60  Kidney Failure <15      CBC (No Diff) [[id number]]  (Abnormal) Collected: 03/22/24 0348    Specimen: Blood Updated: 03/22/24 0505     WBC 7.19 10*3/mm3      RBC 3.97 10*6/mm3      Hemoglobin 12.0 g/dL      Hematocrit 36.5 %      MCV 91.9 fL      MCH 30.2 pg      MCHC 32.9 g/dL      RDW 11.8 %      RDW-SD 39.8 fl      MPV 9.8 fL      Platelets 140 10*3/mm3     POC Glucose Once [[id number]]  (Abnormal) Collected: 03/21/24 2058    Specimen: Blood Updated: 03/21/24 2100     Glucose 322  "mg/dL     STAT Lactic Acid, Reflex [[id number]]  (Normal) Collected: 03/21/24 1919    Specimen: Blood Updated: 03/21/24 2000     Lactate 1.8 mmol/L     Procalcitonin [[id number]]  (Abnormal) Collected: 03/21/24 1527    Specimen: Blood Updated: 03/21/24 1616     Procalcitonin 0.27 ng/mL     Narrative:      As a Marker for Sepsis (Non-Neonates):    1. <0.5 ng/mL represents a low risk of severe sepsis and/or septic shock.  2. >2 ng/mL represents a high risk of severe sepsis and/or septic shock.    As a Marker for Lower Respiratory Tract Infections that require antibiotic therapy:    PCT on Admission    Antibiotic Therapy       6-12 Hrs later    >0.5                Strongly Recommended  >0.25 - <0.5        Recommended   0.1 - 0.25          Discouraged              Remeasure/reassess PCT  <0.1                Strongly Discouraged     Remeasure/reassess PCT    As 28 day mortality risk marker: \"Change in Procalcitonin Result\" (>80% or <=80%) if Day 0 (or Day 1) and Day 4 values are available. Refer to http://www.[employer].coms-pct-calculator.com    Change in PCT <=80%  A decrease of PCT levels below or equal to 80% defines a positive change in PCT test result representing a higher risk for 28-day all-cause mortality of patients diagnosed with severe sepsis for septic shock.    Change in PCT >80%  A decrease of PCT levels of more than 80% defines a negative change in PCT result representing a lower risk for 28-day all-cause mortality of patients diagnosed with severe sepsis or septic shock.       Lactic Acid, Plasma [[id number]]  (Abnormal) Collected: 03/21/24 1527    Specimen: Blood Updated: 03/21/24 1611     Lactate 2.4 mmol/L     Comprehensive Metabolic Panel [[id number]]  (Abnormal) Collected: 03/21/24 1527    Specimen: Blood Updated: 03/21/24 1610     Glucose 322 mg/dL      BUN 22 mg/dL      Creatinine 0.93 mg/dL      Sodium 132 mmol/L      Potassium 4.8 mmol/L      Comment: Slight hemolysis detected by analyzer. Result may be " falsely elevated.        Chloride 95 mmol/L      CO2 23.8 mmol/L      Calcium 8.6 mg/dL      Total Protein 6.6 g/dL      Albumin 3.5 g/dL      ALT (SGPT) 35 U/L      AST (SGOT) 27 U/L      Alkaline Phosphatase 113 U/L      Total Bilirubin 0.5 mg/dL      Globulin 3.1 gm/dL      A/G Ratio 1.1 g/dL      BUN/Creatinine Ratio 23.7     Anion Gap 13.2 mmol/L      eGFR 70.9 mL/min/1.73     Narrative:      GFR Normal >60  Chronic Kidney Disease <60  Kidney Failure <15      Respiratory Panel PCR w/COVID-19(SARS-CoV-2) KEERTHI/BEAU/CRISTIANO/[facility] In-House, NP Swab in UTM/VTM, 2 HR TAT - Swab, Nasopharynx [[id number]]  (Normal) Collected: 03/21/24 1456    Specimen: Swab from Nasopharynx Updated: 03/21/24 1603     ADENOVIRUS, PCR Not Detected     Coronavirus 229E Not Detected     Coronavirus HKU1 Not Detected     Coronavirus NL63 Not Detected     Coronavirus OC43 Not Detected     COVID19 Not Detected     Human Metapneumovirus Not Detected     Human Rhinovirus/Enterovirus Not Detected     Influenza A PCR Not Detected     Influenza B PCR Not Detected     Parainfluenza Virus 1 Not Detected     Parainfluenza Virus 2 Not Detected     Parainfluenza Virus 3 Not Detected     Parainfluenza Virus 4 Not Detected     RSV, PCR Not Detected     Bordetella pertussis pcr Not Detected     Bordetella parapertussis PCR Not Detected     Chlamydophila pneumoniae PCR Not Detected     Mycoplasma pneumo by PCR Not Detected    Narrative:      In the setting of a positive respiratory panel with a viral infection PLUS a negative procalcitonin without other underlying concern for bacterial infection, consider observing off antibiotics or discontinuation of antibiotics and continue supportive care. If the respiratory panel is positive for atypical bacterial infection (Bordetella pertussis, Chlamydophila pneumoniae, or Mycoplasma pneumoniae), consider antibiotic de-escalation to target atypical bacterial infection.    CBC & Differential [[id number]]  (Abnormal)  Collected: 03/21/24 1527    Specimen: Blood Updated: 03/21/24 1559    Narrative:      The following orders were created for panel order CBC & Differential.  Procedure                               Abnormality         Status                     ---------                               -----------         ------                     CBC Auto Differential[[id number]]        Abnormal            Final result                 Please view results for these tests on the individual orders.    CBC Auto Differential [[id number]]  (Abnormal) Collected: 03/21/24 1527    Specimen: Blood Updated: 03/21/24 1559     WBC 9.59 10*3/mm3      RBC 4.15 10*6/mm3      Hemoglobin 12.2 g/dL      Hematocrit 37.1 %      MCV 89.4 fL      MCH 29.4 pg      MCHC 32.9 g/dL      RDW 11.7 %      RDW-SD 38.2 fl      MPV 9.7 fL      Platelets 147 10*3/mm3      Neutrophil % 82.7 %      Lymphocyte % 6.3 %      Monocyte % 10.2 %      Eosinophil % 0.0 %      Basophil % 0.2 %      Neutrophils, Absolute 7.93 10*3/mm3      Lymphocytes, Absolute 0.60 10*3/mm3      Monocytes, Absolute 0.98 10*3/mm3      Eosinophils, Absolute 0.00 10*3/mm3      Basophils, Absolute 0.02 10*3/mm3     Urinalysis With Microscopic If Indicated (No Culture) - Straight Cath [[id number]]  (Abnormal) Collected: 03/21/24 1522    Specimen: Urine from Straight Cath Updated: 03/21/24 1553     Color, UA Yellow     Appearance, UA Clear     pH, UA 6.5     Specific Gravity, UA >=1.030     Glucose, UA >=1000 mg/dL (3+)     Ketones, UA Trace     Bilirubin, UA Negative     Blood, UA Negative     Protein, UA Negative     Leuk Esterase, UA Negative     Nitrite, UA Negative     Urobilinogen, UA 0.2 E.U./dL    Narrative:      Urine microscopic not indicated.    Blood Culture - Blood, Arm, Right [[id number]] Collected: 03/21/24 1508    Specimen: Blood from Arm, Right Updated: 03/21/24 1542    Blood Culture - Blood, Hand, Right [[id number]] Collected: 03/21/24 1537    Specimen: Blood from Hand, Right Updated:  03/21/24 1541    POC Glucose Once [[id number]]  (Abnormal) Collected: 03/21/24 1447    Specimen: Blood Updated: 03/21/24 1448     Glucose 334 mg/dL           Imaging Results (Last 48 Hours)       Procedure Component Value Units Date/Time    XR Chest 1 View [[id number]] Collected: 03/21/24 1553     Updated: 03/21/24 1557    Narrative:      XR CHEST 1 VW-     Clinical: Fever     COMPARISON 12/24/2021     FINDINGS: Cardiac size within normal limits. No mediastinal or hilar  abnormality. Lungs are clear. No effusion or edema seen.     CONCLUSION: No active disease of the chest     This report was finalized on 3/21/2024 3:54 PM by Dr. Reji Keen M.D  on Workstation: [id number]             ECG/EMG Results (last 48 hours)       Procedure Component Value Units Date/Time    Telemetry Scan [[id number]] Resulted: 03/21/24     Updated: 03/22/24 1349          Physician Progress Notes (all)    No notes of this type exist for this encounter.          Consult Notes (all)        Fuentes De Souza MD at 03/22/24 1032        Consult Orders    1. Inpatient Infectious Diseases Consult [[id number]] ordered by Victoria Roche MD at 03/21/24 2029                 Referring Provider: Victoria Roche MD  [facility] [place] [address]    Reason for Consultation: Sepsis    History of present illness:  Roxanna Beck is a 58 y.o. uncontrolled type 2 diabetes with last A1c 11% who I am asked to evaluate and give opinion for sepsis. History is obtained from the patient and review of the old medical records which I summarize/synthesize as follows:     She says that she has been dealing with a right great toe wound for about 5 or 6 weeks now.  She had a recent admission from 2/23-2/26/2024 for this problem.  During that admission, she was seen by podiatry and internal medicine.  She had plain films done that were negative for osteomyelitis.  She was initially treated with vancomycin and  piperacillin-tazobactam and then discharged on cephalexin.  Since that time, she has been following with the podiatry clinic.  Most recently about 3/19/2024 she was placed on cephalexin.  Unfortunately she began to have worsening erythema, pain, and swelling.  This led to fever and confusion at home which prompted her to come to the emergency room yesterday.  I reviewed their consultation note and they noted that the erythema at the toe and foot have progressed compared to the last examination.    In the emergency room and since admission she has been febrile.  She has also been tachycardic with a slightly elevated blood pressure.  Labs were notable for normal WBC, slightly elevated lactate that is improved with fluids, and CRP of 13.5.  Her respiratory panel was negative.  It sounds like there had been a sick contact at home with influenza.  She was started on empiric ceftriaxone.  Podiatry has evaluated and has ordered an MRI of the right foot to evaluate further.      Past Medical History:   Diagnosis Date    Arthritis     Depression     Diabetes mellitus     checks sugar once per day     Dizzy     Fibromyalgia     GERD (gastroesophageal reflux disease)     History of kidney stones     HNP (herniated nucleus pulposus)     Hyperlipidemia     Hypertension     Nausea & vomiting 3/22/2024    Wears glasses     readers       Past Surgical History:   Procedure Laterality Date    BACK SURGERY      L4-5 Discectomy    CARPAL TUNNEL RELEASE      left     SECTION      CHOLECYSTECTOMY      COLONOSCOPY      ENDOSCOPY      HYSTERECTOMY      KIDNEY STONE SURGERY      LUMBAR DISCECTOMY N/A 12/15/2016    Procedure: LUMBAR DISCECTOMY L3-4;  Surgeon: Sandro Cuenca MD;  Location: [facility];  Service:     OOPHORECTOMY      TONSILLECTOMY      WISDOM TOOTH EXTRACTION         Antibiotic allergies and intolerances:  None    Medications:    Current Facility-Administered Medications:     acetaminophen (TYLENOL) tablet 1,000 mg,  1,000 mg, Oral, Once, OsvaldoJeff diaz MD    acetaminophen (TYLENOL) tablet 650 mg, 650 mg, Oral, Q4H PRN, Victoria Roche MD, 650 mg at 03/22/24 0758    sennosides-docusate (PERICOLACE) 8.6-50 MG per tablet 2 tablet, 2 tablet, Oral, BID PRN **AND** polyethylene glycol (MIRALAX) packet 17 g, 17 g, Oral, Daily PRN **AND** bisacodyl (DULCOLAX) EC tablet 5 mg, 5 mg, Oral, Daily PRN **AND** bisacodyl (DULCOLAX) suppository 10 mg, 10 mg, Rectal, Daily PRN, Victoria Roche MD    cefTRIAXone (ROCEPHIN) 2,000 mg in sodium chloride 0.9 % 100 mL MBP, 2,000 mg, Intravenous, Q24H, Victoria Roche MD, Last Rate: 200 mL/hr at 03/22/24 0828, 2,000 mg at 03/22/24 0828    dextrose (D50W) (25 g/50 mL) IV injection 25 g, 25 g, Intravenous, Q15 Min PRN, Victoria Roche MD    dextrose (GLUTOSE) oral gel 15 g, 15 g, Oral, Q15 Min PRN, Victoria Roche MD    DULoxetine (CYMBALTA) DR capsule 60 mg, 60 mg, Oral, Daily, Victoria Roche MD, 60 mg at 03/22/24 0822    glucagon (GLUCAGEN) injection 1 mg, 1 mg, Intramuscular, Q15 Min PRN, Victoria Roche MD    insulin glargine (LANTUS, SEMGLEE) injection 50 Units, 50 Units, Subcutaneous, Daily, Victoria Roche MD, 50 Units at 03/22/24 0826    insulin lispro (HUMALOG/ADMELOG) injection 2-7 Units, 2-7 Units, Subcutaneous, 4x Daily AC & at Bedtime, Victoria Roche MD, 5 Units at 03/21/24 2113    insulin lispro (HUMALOG/ADMELOG) injection 5 Units, 5 Units, Subcutaneous, TID AC, Victoria Roche MD, 5 Units at 03/22/24 0824    lisinopril (PRINIVIL,ZESTRIL) tablet 20 mg, 20 mg, Oral, Daily, Victoria Roche MD, 20 mg at 03/22/24 0823    melatonin tablet 3 mg, 3 mg, Oral, Nightly PRN, Victoria Roche MD    ondansetron ODT (ZOFRAN-ODT) disintegrating tablet 4 mg, 4 mg, Oral, Q6H PRN **OR** ondansetron (ZOFRAN) injection 4 mg, 4 mg, Intravenous, Q6H PRN, Victoria Roche MD, 4 mg at 03/22/24 0023    pantoprazole  (PROTONIX) EC tablet 40 mg, 40 mg, Oral, Q AM, Victoria Roche MD, 40 mg at 03/22/24 0613    rosuvastatin (CRESTOR) tablet 10 mg, 10 mg, Oral, Daily, Victoria Roche MD, 10 mg at 03/22/24 0822    sodium chloride 0.9 % infusion, 75 mL/hr, Intravenous, Continuous, Victoria Roche MD, Last Rate: 75 mL/hr at 03/21/24 2113, 75 mL/hr at 03/21/24 2113      Objective   Vital Signs   Temp:  [98.8 °F (37.1 °C)-103.1 °F (39.5 °C)] 103.1 °F (39.5 °C)  Heart Rate:  [] 115  Resp:  [18-20] 18  BP: (121-158)/() 153/79    Physical Exam:   General: awake, alert, very nice  Eyes: no scleral icterus  ENT: no thrush  Cardiovascular: Tachycardic  Respiratory: normal work of breathing on ambient air  GI: Abdomen is soft, not tender, + bowel sounds in all four quadrants  :  no Johnson catheter  MSK Right great toe is edematous and erythematous with the erythema streaking up the foot  Skin: No rashes  Neurological: Alert and oriented x 3  Psychiatric: Normal mood and affect   Vasc: PIV w/o erythema    Labs:     Lab Results   Component Value Date    WBC 7.19 03/22/2024    HGB 12.0 03/22/2024    HCT 36.5 03/22/2024    MCV 91.9 03/22/2024     03/22/2024       Lab Results   Component Value Date    GLUCOSE 181 (H) 03/22/2024    BUN 19 03/22/2024    CREATININE 0.73 03/22/2024    EGFRIFNONA 76 12/28/2021    BCR 26.0 (H) 03/22/2024    CO2 21.4 (L) 03/22/2024    CALCIUM 8.5 (L) 03/22/2024    ALBUMIN 3.5 03/21/2024    LABIL2 1.4 12/03/2014    AST 27 03/21/2024    ALT 35 (H) 03/21/2024     Lab Results   Component Value Date    CRP 13.59 (H) 03/22/2024     Lab Results   Component Value Date    HGBA1C 11.20 (H) 03/22/2024     Procalcitonin 0.27  Lactate 2.4 now down to 1.8  Urinalysis clear, trace ketones, glucose greater than thousand    Microbiology:  3/21 RPP: Negative  3/21 BCx: Pending      Radiology:  CXR personally reviewed and is negative for pneumonia      ASSESSMENT/PLAN:  Sepsis due to right great toe  and right foot infection  Uncontrolled type 2 diabetes -last A1c 11.2%  Elevated C-reactive protein  Obesity BMI 30    The edema and erythema of the right great toe are quite obvious on exam as stated by others.  I think this is the source of her infection.  I recommend that we continue empiric ceftriaxone 2 g IV every 24 hours but should also start vancomycin with goal -600.  MRI has been ordered, and I will follow-up that result.  I think there is a good chance she will end up needing to undergo surgical debridement and she tells me she has been told the same by her foot surgeon.    While the patient is on vancomycin, vancomycin levels will be ordered and reviewed with dose adjustments made as needed. The patient will have a daily creatinine level checked while on vancomycin as part of monitoring this medication.     Going forward, stricter glucose control will be of the utmost importance to promote healing and decrease risk of future infection.    ID will follow.       Electronically signed by Fuentes De Souza MD at 03/22/24 1114       López Zapata DPM at 03/22/24 0852        Consult Orders    1. Inpatient Podiatry Consult [[id number]] ordered by Victoria Roche MD at 03/21/24 2029                 Podiatry Consult Note      Patient: Roxanna Beck Admit Date: 03/21/2024    Age: 58 y.o.   PCP: Donato Vilchis DO    MRN: [id number]  Room: [facility]        Subjective     Chief Complaint     Chief Complaint   Patient presents with    Altered Mental Status        HPI     This is a 59yo F who is established with myself who presents with right hallux infection. Patient was at my office on 3/19/2024 for evaluation of her diabetic foot ulcer. Xrays were taken and did not show any cortical erosion, however the ulcer was probing to capsule/bone. I placed the patient on PO keflex. Patient also informed me that her A1c has been very high. She is suppose to be taking 50units Lantus, however her  "insurance will not cover this amount for another week, so she has been taking 20units daily.     Over the last day, the patient has become disoriented. She came to  ED and was admitted. She has been receiving IV abx.    Past Medical History     Past Medical History:   Diagnosis Date    Arthritis     Depression     Diabetes mellitus     checks sugar once per day     Dizzy     Fibromyalgia     GERD (gastroesophageal reflux disease)     History of kidney stones     HNP (herniated nucleus pulposus)     Hyperlipidemia     Hypertension     Nausea & vomiting 3/22/2024    Wears glasses     readers        Past Surgical History:   Procedure Laterality Date    BACK SURGERY      L4-5 Discectomy    CARPAL TUNNEL RELEASE      left     SECTION      CHOLECYSTECTOMY      COLONOSCOPY      ENDOSCOPY      HYSTERECTOMY      KIDNEY STONE SURGERY      LUMBAR DISCECTOMY N/A 12/15/2016    Procedure: LUMBAR DISCECTOMY L3-4;  Surgeon: Sandro Cuenca MD;  Location: [facility] OR;  Service:     OOPHORECTOMY      TONSILLECTOMY      WISDOM TOOTH EXTRACTION          Allergies   Allergen Reactions    Codeine Nausea And Vomiting    Percocet [Oxycodone-Acetaminophen] Nausea And Vomiting     Caused bad pain    Adhesive Tape Rash    Toradol [Ketorolac Tromethamine] Other (See Comments)     \"doesnt do anything\"   - DOESN'T WORK PER PATIENT        Social History     Tobacco Use   Smoking Status Never   Smokeless Tobacco Never        Objective   Physical Exam    Vitals:    24 0130   BP:    Pulse:    Resp:    Temp: 99.7 °F (37.6 °C)   SpO2:         Dermatology: ulceration to the medial right hallux has hyperkeratotic rim, drainage present. Probe to bone. Periwound erythma, moving proximally by 1st metatarsal.     Vascular: DP and PT pulses palpable    Neurological: light touch and protective sensation diminished    Musckuloskeletal: 1st MTPJ and AJ ROM normal, no crepitus    Labs     Lab Results   Component Value Date    HGBA1C 12.40 (H) " 02/26/2024    POCGLU 128 03/22/2024    SEDRATE 20 02/23/2024        CBC:      Lab 03/22/24  0348 03/21/24  1527   WBC 7.19 9.59   HEMOGLOBIN 12.0 12.2   HEMATOCRIT 36.5 37.1   PLATELETS 140 147   NEUTROS ABS  --  7.93*   LYMPHS ABS  --  0.60*   MONOS ABS  --  0.98*   EOS ABS  --  0.00   MCV 91.9 89.4          Results for orders placed or performed during the hospital encounter of 02/23/24   Blood Culture - Blood, Arm, Left    Specimen: Arm, Left; Blood   Result Value Ref Range    Blood Culture No growth at 5 days         XR Chest 1 View  XR CHEST 1 VW-     Clinical: Fever     COMPARISON 12/24/2021     FINDINGS: Cardiac size within normal limits. No mediastinal or hilar  abnormality. Lungs are clear. No effusion or edema seen.     CONCLUSION: No active disease of the chest     This report was finalized on 3/21/2024 3:54 PM by Dr. Reji Keen M.D  on Workstation: [id number]          Assessment/Plan     57yo F Diabetic foot ulcer, possible osteomyelitis, right hallux    -Pt examined and evaluated by myself  -Labs reviewed, WBC stable, neutrophil count elevated. I will order ESR, CRP, A1c  -Will order ABIs  -The erythema and cellulitis appears worse from when I saw the patient 4 days ago. I will order Right foot MRI to evaluate the ulcer and possible osteomyelitis    Thank you for consult, I will continue to follow      López Zapata DPM  Office: [phone]     Electronically signed by López Zaptaa DPM at 03/22/24 6192

## 2024-03-22 NOTE — PROGRESS NOTES
Name: Roxanna Beck ADMIT: 3/21/2024   : 1965  PCP: Donato Vilchis DO    MRN: [id number] LOS: 1 days   AGE/SEX: 58 y.o. female  ROOM: [facility]     Subjective   Subjective   At present c/o chills. N/V have resolved. No longer confused. Tolerating po. Voiding well. No SOA or CP or palp.       Objective   Objective   Vital Signs  Temp:  [97.7 °F (36.5 °C)-103.1 °F (39.5 °C)] 97.7 °F (36.5 °C)  Heart Rate:  [] 92  Resp:  [18-20] 18  BP: (105-156)/(64-84) 105/69  SpO2:  [93 %-100 %] 100 %  on  Flow (L/min):  [2] 2;   Device (Oxygen Therapy): room air  Body mass index is 30.98 kg/m².  Physical Exam  Vitals and nursing note reviewed. Exam conducted with a chaperone present (RN).   Constitutional:       General: She is not in acute distress.     Appearance: She is ill-appearing. She is not toxic-appearing or diaphoretic.      Comments: Chills   HENT:      Head: Normocephalic.      Mouth/Throat:      Mouth: Mucous membranes are moist.      Pharynx: Oropharynx is clear.   Eyes:      General: No scleral icterus.        Right eye: No discharge.         Left eye: No discharge.      Extraocular Movements: Extraocular movements intact.      Conjunctiva/sclera: Conjunctivae normal.   Cardiovascular:      Rate and Rhythm: Normal rate and regular rhythm.      Pulses: Normal pulses.   Pulmonary:      Effort: Pulmonary effort is normal. No respiratory distress.      Breath sounds: Normal breath sounds. No wheezing or rales.   Abdominal:      General: Bowel sounds are normal. There is no distension.      Palpations: Abdomen is soft.      Tenderness: There is no abdominal tenderness.   Musculoskeletal:         General: No swelling or deformity. Normal range of motion.      Cervical back: Neck supple.      Comments: SCDs in place  Erythematous ulcer right 1st toe   Skin:     General: Skin is warm and dry.      Capillary Refill: Capillary refill takes less than 2 seconds.      Coloration: Skin is not jaundiced.    Neurological:      General: No focal deficit present.      Mental Status: She is alert and oriented to person, place, and time. Mental status is at baseline.      Cranial Nerves: No cranial nerve deficit.      Coordination: Coordination normal.   Psychiatric:         Mood and Affect: Mood normal.         Behavior: Behavior normal.         Thought Content: Thought content normal.       Results Review     I reviewed the patient's new clinical results.  Results from last 7 days   Lab Units 03/22/24  0348 03/21/24  1527   WBC 10*3/mm3 7.19 9.59   HEMOGLOBIN g/dL 12.0 12.2   PLATELETS 10*3/mm3 140 147     Results from last 7 days   Lab Units 03/22/24  0348 03/21/24  1527   SODIUM mmol/L 134* 132*   POTASSIUM mmol/L 3.8 4.8   CHLORIDE mmol/L 98 95*   CO2 mmol/L 21.4* 23.8   BUN mg/dL 19 22*   CREATININE mg/dL 0.73 0.93   GLUCOSE mg/dL 181* 322*   EGFR mL/min/1.73 94.9 70.9     Results from last 7 days   Lab Units 03/21/24  1527   ALBUMIN g/dL 3.5   BILIRUBIN mg/dL 0.5   ALK PHOS U/L 113   AST (SGOT) U/L 27   ALT (SGPT) U/L 35*     Results from last 7 days   Lab Units 03/22/24  0348 03/21/24  1527   CALCIUM mg/dL 8.5* 8.6   ALBUMIN g/dL  --  3.5     Results from last 7 days   Lab Units 03/21/24  1919 03/21/24  1527   PROCALCITONIN ng/mL  --  0.27*   LACTATE mmol/L 1.8 2.4*     Hemoglobin A1C   Date/Time Value Ref Range Status   03/22/2024 0953 11.20 (H) 4.80 - 5.60 % Final     Glucose   Date/Time Value Ref Range Status   03/22/2024 1633 272 (H) 70 - 130 mg/dL Final   03/22/2024 1124 293 (H) 70 - 130 mg/dL Final   03/22/2024 0627 128 70 - 130 mg/dL Final   03/21/2024 2058 322 (H) 70 - 130 mg/dL Final   03/21/2024 1447 334 (H) 70 - 130 mg/dL Final       No radiology results for the last day    I have personally reviewed all medications:  Scheduled Medications  acetaminophen, 1,000 mg, Oral, Once  cefTRIAXone, 2,000 mg, Intravenous, Q24H  DULoxetine, 60 mg, Oral, Daily  insulin glargine, 50 Units, Subcutaneous,  Daily  insulin lispro, 2-7 Units, Subcutaneous, 4x Daily AC & at Bedtime  insulin lispro, 5 Units, Subcutaneous, TID AC  lisinopril, 20 mg, Oral, Daily  pantoprazole, 40 mg, Oral, Q AM  rosuvastatin, 10 mg, Oral, Daily  vancomycin, 1,000 mg, Intravenous, Q12H    Infusions  Pharmacy to dose vancomycin,   sodium chloride, 75 mL/hr, Last Rate: 75 mL/hr (03/22/24 1616)    Diet  Diet: Cardiac; Healthy Heart (2-3 Na+); Fluid Consistency: Thin (IDDSI 0)    I have personally reviewed:  [x]  Laboratory   [x]  Microbiology   [x]  Radiology   [x]  EKG/Telemetry  [x]  Cardiology/Vascular   []  Pathology    [x]  Records       Assessment/Plan     Active Hospital Problems    Diagnosis  POA    **Sepsis [A41.9]  Yes    Mixed hyperlipidemia [E78.2]  Yes    GERD (gastroesophageal reflux disease) [K21.9]  Yes    Nausea & vomiting [R11.2]  Yes    Fever [R50.9]  Yes    Hyponatremia [E87.1]  Yes    Acute metabolic encephalopathy [G93.41]  Yes    Diabetic ulcer of right great toe [E11.621, L97.519]  Yes    Insulin dependent type 2 diabetes mellitus [E11.9, Z79.4]  Not Applicable    Hypertension [I10]  Yes    Diabetic peripheral neuropathy [E11.42]  Yes    Generalized anxiety disorder [F41.1]  Yes      Resolved Hospital Problems   No resolved problems to display.     59yo woman with DM2, DPN, CAROL, HTN, GERD, HLD, and DFU of right 1st toe, who presented with confusion, nausea/vomiting, and temp of 103.2 per EMS.    Right 1st toe DFU, infected  Fever  Appreciate ID and Pod attention to pt  KELI looks okay  MRI pending  Blood cultures NGTD  Still febrile  Continue Rocephin, Vanc added  APAP for fever  LA has normalized and mental status cleared  WBC wnl    Metabolic encephalopathy (due to above)  Resolved with tx of infection    DM2  DPN  Uncontrolled--A1c 11.2  Continue Lantus/SSI/mealtime insulin  Restart home Gabapentin, continue SNRI    GERD  Continue PPI    HLD  Continue statin    HTN  No hypotension, Cr fine  Continue  Lisinopril    CAROL  Continue SNRI    Hyponatremia  Improved with IV NS  Continue for now while still febrile      SCDs for DVT prophylaxis.  Full code.  Discussed with patient. D/w RN, CCP, and Pharm at morning huddle.  Anticipate discharge TBD        Kale Villarreal MD  [facility]  03/22/24  16:45 EDT

## 2024-03-22 NOTE — CONSULTS
Referring Provider: Victoria Roche MD  [address]  [address]  [place] [address]    Reason for Consultation: Sepsis    History of present illness:  Roxanna Beck is a 58 y.o. uncontrolled type 2 diabetes with last A1c 11% who I am asked to evaluate and give opinion for sepsis. History is obtained from the patient and review of the old medical records which I summarize/synthesize as follows:     She says that she has been dealing with a right great toe wound for about 5 or 6 weeks now.  She had a recent admission from 2/23-2/26/2024 for this problem.  During that admission, she was seen by podiatry and internal medicine.  She had plain films done that were negative for osteomyelitis.  She was initially treated with vancomycin and piperacillin-tazobactam and then discharged on cephalexin.  Since that time, she has been following with the podiatry clinic.  Most recently about 3/19/2024 she was placed on cephalexin.  Unfortunately she began to have worsening erythema, pain, and swelling.  This led to fever and confusion at home which prompted her to come to the emergency room yesterday.  I reviewed their consultation note and they noted that the erythema at the toe and foot have progressed compared to the last examination.    In the emergency room and since admission she has been febrile.  She has also been tachycardic with a slightly elevated blood pressure.  Labs were notable for normal WBC, slightly elevated lactate that is improved with fluids, and CRP of 13.5.  Her respiratory panel was negative.  It sounds like there had been a sick contact at home with influenza.  She was started on empiric ceftriaxone.  Podiatry has evaluated and has ordered an MRI of the right foot to evaluate further.      Past Medical History:   Diagnosis Date    Arthritis     Depression     Diabetes mellitus     checks sugar once per day     Dizzy     Fibromyalgia     GERD (gastroesophageal reflux disease)     History of  kidney stones     HNP (herniated nucleus pulposus)     Hyperlipidemia     Hypertension     Nausea & vomiting 3/22/2024    Wears glasses     readers       Past Surgical History:   Procedure Laterality Date    BACK SURGERY      L4-5 Discectomy    CARPAL TUNNEL RELEASE      left     SECTION      CHOLECYSTECTOMY      COLONOSCOPY      ENDOSCOPY      HYSTERECTOMY      KIDNEY STONE SURGERY      LUMBAR DISCECTOMY N/A 12/15/2016    Procedure: LUMBAR DISCECTOMY L3-4;  Surgeon: Sandro Cuenca MD;  Location: [facility];  Service:     OOPHORECTOMY      TONSILLECTOMY      WISDOM TOOTH EXTRACTION         Antibiotic allergies and intolerances:  None    Medications:    Current Facility-Administered Medications:     acetaminophen (TYLENOL) tablet 1,000 mg, 1,000 mg, Oral, Once, HunnewellJeff diaz MD    acetaminophen (TYLENOL) tablet 650 mg, 650 mg, Oral, Q4H PRN, Victoria Roche MD, 650 mg at 24 0758    sennosides-docusate (PERICOLACE) 8.6-50 MG per tablet 2 tablet, 2 tablet, Oral, BID PRN **AND** polyethylene glycol (MIRALAX) packet 17 g, 17 g, Oral, Daily PRN **AND** bisacodyl (DULCOLAX) EC tablet 5 mg, 5 mg, Oral, Daily PRN **AND** bisacodyl (DULCOLAX) suppository 10 mg, 10 mg, Rectal, Daily PRN, Victoria Roche MD    cefTRIAXone (ROCEPHIN) 2,000 mg in sodium chloride 0.9 % 100 mL MBP, 2,000 mg, Intravenous, Q24H, Victoria Roche MD, Last Rate: 200 mL/hr at 24 0828, 2,000 mg at 24 0828    dextrose (D50W) (25 g/50 mL) IV injection 25 g, 25 g, Intravenous, Q15 Min PRN, Victoria Roche MD    dextrose (GLUTOSE) oral gel 15 g, 15 g, Oral, Q15 Min PRN, Victoria Roche MD    DULoxetine (CYMBALTA) DR capsule 60 mg, 60 mg, Oral, Daily, StingVictoria cole MD, 60 mg at 24 0822    glucagon (GLUCAGEN) injection 1 mg, 1 mg, Intramuscular, Q15 Min PRN, Arturkelsey, Victoria Law MD    insulin glargine (LANTUS, SEMGLEE) injection 50 Units, 50 Units, Subcutaneous, Daily, Stingl,  Victoria Law MD, 50 Units at 03/22/24 0826    insulin lispro (HUMALOG/ADMELOG) injection 2-7 Units, 2-7 Units, Subcutaneous, 4x Daily AC & at Bedtime, Victoria Roche MD, 5 Units at 03/21/24 2113    insulin lispro (HUMALOG/ADMELOG) injection 5 Units, 5 Units, Subcutaneous, TID AC, Victoria Roche MD, 5 Units at 03/22/24 0824    lisinopril (PRINIVIL,ZESTRIL) tablet 20 mg, 20 mg, Oral, Daily, Victoria Roche MD, 20 mg at 03/22/24 0823    melatonin tablet 3 mg, 3 mg, Oral, Nightly PRN, Victoria Roche MD    ondansetron ODT (ZOFRAN-ODT) disintegrating tablet 4 mg, 4 mg, Oral, Q6H PRN **OR** ondansetron (ZOFRAN) injection 4 mg, 4 mg, Intravenous, Q6H PRN, Victoria Roche MD, 4 mg at 03/22/24 0023    pantoprazole (PROTONIX) EC tablet 40 mg, 40 mg, Oral, Q AM, Victoria Roche MD, 40 mg at 03/22/24 0613    rosuvastatin (CRESTOR) tablet 10 mg, 10 mg, Oral, Daily, Victoria Roche MD, 10 mg at 03/22/24 0822    sodium chloride 0.9 % infusion, 75 mL/hr, Intravenous, Continuous, Victoria Roche MD, Last Rate: 75 mL/hr at 03/21/24 2113, 75 mL/hr at 03/21/24 2113      Objective   Vital Signs   Temp:  [98.8 °F (37.1 °C)-103.1 °F (39.5 °C)] 103.1 °F (39.5 °C)  Heart Rate:  [] 115  Resp:  [18-20] 18  BP: (121-158)/() 153/79    Physical Exam:   General: awake, alert, very nice  Eyes: no scleral icterus  ENT: no thrush  Cardiovascular: Tachycardic  Respiratory: normal work of breathing on ambient air  GI: Abdomen is soft, not tender, + bowel sounds in all four quadrants  :  no Johnson catheter  MSK Right great toe is edematous and erythematous with the erythema streaking up the foot  Skin: No rashes  Neurological: Alert and oriented x 3  Psychiatric: Normal mood and affect   Vasc: PIV w/o erythema    Labs:     Lab Results   Component Value Date    WBC 7.19 03/22/2024    HGB 12.0 03/22/2024    HCT 36.5 03/22/2024    MCV 91.9 03/22/2024     03/22/2024       Lab  Results   Component Value Date    GLUCOSE 181 (H) 03/22/2024    BUN 19 03/22/2024    CREATININE 0.73 03/22/2024    EGFRIFNONA 76 12/28/2021    BCR 26.0 (H) 03/22/2024    CO2 21.4 (L) 03/22/2024    CALCIUM 8.5 (L) 03/22/2024    ALBUMIN 3.5 03/21/2024    LABIL2 1.4 12/03/2014    AST 27 03/21/2024    ALT 35 (H) 03/21/2024     Lab Results   Component Value Date    CRP 13.59 (H) 03/22/2024     Lab Results   Component Value Date    HGBA1C 11.20 (H) 03/22/2024     Procalcitonin 0.27  Lactate 2.4 now down to 1.8  Urinalysis clear, trace ketones, glucose greater than thousand    Microbiology:  3/21 RPP: Negative  3/21 BCx: Pending      Radiology:  CXR personally reviewed and is negative for pneumonia      ASSESSMENT/PLAN:  Sepsis due to right great toe and right foot infection  Uncontrolled type 2 diabetes -last A1c 11.2%  Elevated C-reactive protein  Obesity BMI 30    The edema and erythema of the right great toe are quite obvious on exam as stated by others.  I think this is the source of her infection.  I recommend that we continue empiric ceftriaxone 2 g IV every 24 hours but should also start vancomycin with goal -600.  MRI has been ordered, and I will follow-up that result.  I think there is a good chance she will end up needing to undergo surgical debridement and she tells me she has been told the same by her foot surgeon.    While the patient is on vancomycin, vancomycin levels will be ordered and reviewed with dose adjustments made as needed. The patient will have a daily creatinine level checked while on vancomycin as part of monitoring this medication.     Going forward, stricter glucose control will be of the utmost importance to promote healing and decrease risk of future infection.    ID will follow.

## 2024-03-22 NOTE — NURSING NOTE
CWON note: consult received for right great toe wound POA. Podiatry has been consulted. Will defer care and management to podiatry. Please re-consult for any additional needs.

## 2024-03-23 ENCOUNTER — APPOINTMENT (OUTPATIENT)
Dept: MRI IMAGING | Facility: HOSPITAL | Age: 59
End: 2024-03-23
Payer: MEDICAID

## 2024-03-23 PROBLEM — A41.50 SEPSIS DUE TO GRAM NEGATIVE BACTERIA: Status: ACTIVE | Noted: 2024-03-23

## 2024-03-23 PROBLEM — R78.81 GRAM-NEGATIVE BACTEREMIA: Status: ACTIVE | Noted: 2024-03-23

## 2024-03-23 LAB
ANION GAP SERPL CALCULATED.3IONS-SCNC: 12.8 MMOL/L (ref 5–15)
BACTERIA BLD CULT: ABNORMAL
BOTTLE TYPE: ABNORMAL
BUN SERPL-MCNC: 16 MG/DL (ref 6–20)
BUN/CREAT SERPL: 21.9 (ref 7–25)
CALCIUM SPEC-SCNC: 8.3 MG/DL (ref 8.6–10.5)
CHLORIDE SERPL-SCNC: 99 MMOL/L (ref 98–107)
CO2 SERPL-SCNC: 20.2 MMOL/L (ref 22–29)
CREAT SERPL-MCNC: 0.73 MG/DL (ref 0.57–1)
DEPRECATED RDW RBC AUTO: 40.4 FL (ref 37–54)
EGFRCR SERPLBLD CKD-EPI 2021: 94.9 ML/MIN/1.73
ERYTHROCYTE [DISTWIDTH] IN BLOOD BY AUTOMATED COUNT: 12.1 % (ref 12.3–15.4)
GLUCOSE BLDC GLUCOMTR-MCNC: 162 MG/DL (ref 70–130)
GLUCOSE BLDC GLUCOMTR-MCNC: 247 MG/DL (ref 70–130)
GLUCOSE BLDC GLUCOMTR-MCNC: 271 MG/DL (ref 70–130)
GLUCOSE BLDC GLUCOMTR-MCNC: 320 MG/DL (ref 70–130)
GLUCOSE SERPL-MCNC: 138 MG/DL (ref 65–99)
HCT VFR BLD AUTO: 35.8 % (ref 34–46.6)
HGB BLD-MCNC: 11.9 G/DL (ref 12–15.9)
MAGNESIUM SERPL-MCNC: 1.7 MG/DL (ref 1.6–2.6)
MCH RBC QN AUTO: 30.1 PG (ref 26.6–33)
MCHC RBC AUTO-ENTMCNC: 33.2 G/DL (ref 31.5–35.7)
MCV RBC AUTO: 90.6 FL (ref 79–97)
PLATELET # BLD AUTO: 146 10*3/MM3 (ref 140–450)
PMV BLD AUTO: 10.1 FL (ref 6–12)
POTASSIUM SERPL-SCNC: 3.6 MMOL/L (ref 3.5–5.2)
RBC # BLD AUTO: 3.95 10*6/MM3 (ref 3.77–5.28)
SODIUM SERPL-SCNC: 132 MMOL/L (ref 136–145)
WBC NRBC COR # BLD AUTO: 6.02 10*3/MM3 (ref 3.4–10.8)

## 2024-03-23 PROCEDURE — 82948 REAGENT STRIP/BLOOD GLUCOSE: CPT

## 2024-03-23 PROCEDURE — 73720 MRI LWR EXTREMITY W/O&W/DYE: CPT

## 2024-03-23 PROCEDURE — 99233 SBSQ HOSP IP/OBS HIGH 50: CPT | Performed by: INTERNAL MEDICINE

## 2024-03-23 PROCEDURE — 25010000002 CEFTRIAXONE PER 250 MG: Performed by: INTERNAL MEDICINE

## 2024-03-23 PROCEDURE — 25810000003 SODIUM CHLORIDE 0.9 % SOLUTION 250 ML FLEX CONT: Performed by: INTERNAL MEDICINE

## 2024-03-23 PROCEDURE — 25010000002 CEFEPIME PER 500 MG: Performed by: INTERNAL MEDICINE

## 2024-03-23 PROCEDURE — A9577 INJ MULTIHANCE: HCPCS | Performed by: HOSPITALIST

## 2024-03-23 PROCEDURE — 80048 BASIC METABOLIC PNL TOTAL CA: CPT | Performed by: INTERNAL MEDICINE

## 2024-03-23 PROCEDURE — 85027 COMPLETE CBC AUTOMATED: CPT | Performed by: INTERNAL MEDICINE

## 2024-03-23 PROCEDURE — 63710000001 INSULIN LISPRO (HUMAN) PER 5 UNITS: Performed by: INTERNAL MEDICINE

## 2024-03-23 PROCEDURE — 63710000001 INSULIN GLARGINE PER 5 UNITS: Performed by: INTERNAL MEDICINE

## 2024-03-23 PROCEDURE — 25010000002 VANCOMYCIN 1 G RECONSTITUTED SOLUTION 1 EACH VIAL: Performed by: INTERNAL MEDICINE

## 2024-03-23 PROCEDURE — 83735 ASSAY OF MAGNESIUM: CPT | Performed by: HOSPITALIST

## 2024-03-23 PROCEDURE — 63710000001 ONDANSETRON ODT 4 MG TABLET DISPERSIBLE: Performed by: INTERNAL MEDICINE

## 2024-03-23 PROCEDURE — 0 GADOBENATE DIMEGLUMINE 529 MG/ML SOLUTION: Performed by: HOSPITALIST

## 2024-03-23 RX ORDER — SUMATRIPTAN 50 MG/1
50 TABLET, FILM COATED ORAL
Status: DISCONTINUED | OUTPATIENT
Start: 2024-03-23 | End: 2024-03-26 | Stop reason: HOSPADM

## 2024-03-23 RX ADMIN — INSULIN LISPRO 4 UNITS: 100 INJECTION, SOLUTION INTRAVENOUS; SUBCUTANEOUS at 18:07

## 2024-03-23 RX ADMIN — SUMATRIPTAN SUCCINATE 50 MG: 50 TABLET ORAL at 21:32

## 2024-03-23 RX ADMIN — INSULIN GLARGINE 50 UNITS: 100 INJECTION, SOLUTION SUBCUTANEOUS at 08:45

## 2024-03-23 RX ADMIN — GADOBENATE DIMEGLUMINE 15 ML: 529 INJECTION, SOLUTION INTRAVENOUS at 17:11

## 2024-03-23 RX ADMIN — ROSUVASTATIN CALCIUM 10 MG: 10 TABLET, FILM COATED ORAL at 08:45

## 2024-03-23 RX ADMIN — INSULIN LISPRO 3 UNITS: 100 INJECTION, SOLUTION INTRAVENOUS; SUBCUTANEOUS at 12:29

## 2024-03-23 RX ADMIN — INSULIN LISPRO 2 UNITS: 100 INJECTION, SOLUTION INTRAVENOUS; SUBCUTANEOUS at 07:55

## 2024-03-23 RX ADMIN — ONDANSETRON 4 MG: 4 TABLET, ORALLY DISINTEGRATING ORAL at 07:55

## 2024-03-23 RX ADMIN — CEFTRIAXONE 2000 MG: 2 INJECTION, POWDER, FOR SOLUTION INTRAMUSCULAR; INTRAVENOUS at 09:25

## 2024-03-23 RX ADMIN — GABAPENTIN 300 MG: 300 CAPSULE ORAL at 20:11

## 2024-03-23 RX ADMIN — CEFEPIME 2000 MG: 2 INJECTION, POWDER, FOR SOLUTION INTRAVENOUS at 18:04

## 2024-03-23 RX ADMIN — INSULIN LISPRO 5 UNITS: 100 INJECTION, SOLUTION INTRAVENOUS; SUBCUTANEOUS at 18:07

## 2024-03-23 RX ADMIN — INSULIN LISPRO 5 UNITS: 100 INJECTION, SOLUTION INTRAVENOUS; SUBCUTANEOUS at 21:33

## 2024-03-23 RX ADMIN — LISINOPRIL 20 MG: 20 TABLET ORAL at 08:45

## 2024-03-23 RX ADMIN — INSULIN LISPRO 5 UNITS: 100 INJECTION, SOLUTION INTRAVENOUS; SUBCUTANEOUS at 12:29

## 2024-03-23 RX ADMIN — VANCOMYCIN HYDROCHLORIDE 1000 MG: 1 INJECTION, POWDER, LYOPHILIZED, FOR SOLUTION INTRAVENOUS at 08:45

## 2024-03-23 RX ADMIN — INSULIN LISPRO 5 UNITS: 100 INJECTION, SOLUTION INTRAVENOUS; SUBCUTANEOUS at 07:56

## 2024-03-23 RX ADMIN — HYDROCODONE BITARTRATE AND ACETAMINOPHEN 1 TABLET: 5; 325 TABLET ORAL at 07:43

## 2024-03-23 RX ADMIN — DULOXETINE HYDROCHLORIDE 60 MG: 60 CAPSULE, DELAYED RELEASE ORAL at 08:45

## 2024-03-23 RX ADMIN — ACETAMINOPHEN 325MG 650 MG: 325 TABLET ORAL at 06:52

## 2024-03-23 RX ADMIN — PANTOPRAZOLE SODIUM 40 MG: 40 TABLET, DELAYED RELEASE ORAL at 06:22

## 2024-03-23 RX ADMIN — CEFEPIME 2000 MG: 2 INJECTION, POWDER, FOR SOLUTION INTRAVENOUS at 10:33

## 2024-03-23 NOTE — PROGRESS NOTES
Name: Roxanna Beck ADMIT: 3/21/2024   : 1965  PCP: Donato Vilchis DO    MRN: [id number] LOS: 2 days   AGE/SEX: 58 y.o. female  ROOM: [facility]     Subjective   Subjective   Feeling better today. No subjective fever. N/V have resolved. No longer confused. Tolerating po. Voiding well. No SOA or CP or palp.       Objective   Objective   Vital Signs  Temp:  [97.5 °F (36.4 °C)-102.4 °F (39.1 °C)] 97.5 °F (36.4 °C)  Heart Rate:  [] 87  Resp:  [16-18] 18  BP: (108-149)/(62-82) 108/62  SpO2:  [93 %-100 %] 93 %  on   ;   Device (Oxygen Therapy): room air  Body mass index is 31.41 kg/m².  Physical Exam  Vitals and nursing note reviewed.   Constitutional:       General: She is not in acute distress.     Appearance: She is ill-appearing. She is not toxic-appearing or diaphoretic.   HENT:      Head: Normocephalic.      Mouth/Throat:      Mouth: Mucous membranes are moist.      Pharynx: Oropharynx is clear.   Eyes:      General: No scleral icterus.        Right eye: No discharge.         Left eye: No discharge.      Extraocular Movements: Extraocular movements intact.      Conjunctiva/sclera: Conjunctivae normal.   Cardiovascular:      Rate and Rhythm: Normal rate and regular rhythm.      Pulses: Normal pulses.   Pulmonary:      Effort: Pulmonary effort is normal. No respiratory distress.      Breath sounds: Normal breath sounds. No wheezing or rales.   Abdominal:      General: Bowel sounds are normal. There is no distension.      Palpations: Abdomen is soft.      Tenderness: There is no abdominal tenderness.      Comments: Truncal obesity   Musculoskeletal:         General: No swelling or deformity. Normal range of motion.      Cervical back: Neck supple.      Comments: SCDs in place  Erythematous ulcer right 1st toe   Skin:     General: Skin is warm and dry.      Capillary Refill: Capillary refill takes less than 2 seconds.      Coloration: Skin is not jaundiced.   Neurological:      General: No focal  deficit present.      Mental Status: She is alert and oriented to person, place, and time. Mental status is at baseline.      Cranial Nerves: No cranial nerve deficit.      Coordination: Coordination normal.   Psychiatric:         Mood and Affect: Mood normal.         Behavior: Behavior normal.         Thought Content: Thought content normal.       Results Review     I reviewed the patient's new clinical results.  Results from last 7 days   Lab Units 03/23/24  0404 03/22/24  0348 03/21/24  1527   WBC 10*3/mm3 6.02 7.19 9.59   HEMOGLOBIN g/dL 11.9* 12.0 12.2   PLATELETS 10*3/mm3 146 140 147     Results from last 7 days   Lab Units 03/23/24  0404 03/22/24  0348 03/21/24  1527   SODIUM mmol/L 132* 134* 132*   POTASSIUM mmol/L 3.6 3.8 4.8   CHLORIDE mmol/L 99 98 95*   CO2 mmol/L 20.2* 21.4* 23.8   BUN mg/dL 16 19 22*   CREATININE mg/dL 0.73 0.73 0.93   GLUCOSE mg/dL 138* 181* 322*   EGFR mL/min/1.73 94.9 94.9 70.9     Results from last 7 days   Lab Units 03/21/24  1527   ALBUMIN g/dL 3.5   BILIRUBIN mg/dL 0.5   ALK PHOS U/L 113   AST (SGOT) U/L 27   ALT (SGPT) U/L 35*     Results from last 7 days   Lab Units 03/23/24  0404 03/22/24  0348 03/21/24  1527   CALCIUM mg/dL 8.3* 8.5* 8.6   ALBUMIN g/dL  --   --  3.5   MAGNESIUM mg/dL 1.7  --   --      Results from last 7 days   Lab Units 03/21/24  1919 03/21/24  1527   PROCALCITONIN ng/mL  --  0.27*   LACTATE mmol/L 1.8 2.4*     Hemoglobin A1C   Date/Time Value Ref Range Status   03/22/2024 0953 11.20 (H) 4.80 - 5.60 % Final     Glucose   Date/Time Value Ref Range Status   03/23/2024 1139 247 (H) 70 - 130 mg/dL Final   03/23/2024 0602 162 (H) 70 - 130 mg/dL Final   03/22/2024 2111 158 (H) 70 - 130 mg/dL Final   03/22/2024 1633 272 (H) 70 - 130 mg/dL Final   03/22/2024 1124 293 (H) 70 - 130 mg/dL Final   03/22/2024 0627 128 70 - 130 mg/dL Final   03/21/2024 2058 322 (H) 70 - 130 mg/dL Final       No radiology results for the last day    I have personally reviewed all  medications:  Scheduled Medications  acetaminophen, 1,000 mg, Oral, Once  cefepime, 2,000 mg, Intravenous, Q8H  DULoxetine, 60 mg, Oral, Daily  gabapentin, 300 mg, Oral, Nightly  insulin glargine, 50 Units, Subcutaneous, Daily  insulin lispro, 2-7 Units, Subcutaneous, 4x Daily AC & at Bedtime  insulin lispro, 5 Units, Subcutaneous, TID AC  lisinopril, 20 mg, Oral, Daily  pantoprazole, 40 mg, Oral, Q AM  rosuvastatin, 10 mg, Oral, Daily    Infusions  sodium chloride, 75 mL/hr, Last Rate: 75 mL/hr (03/22/24 1616)    Diet  Diet: Cardiac; Healthy Heart (2-3 Na+); Fluid Consistency: Thin (IDDSI 0)    I have personally reviewed:  [x]  Laboratory   [x]  Microbiology   []  Radiology   [x]  EKG/Telemetry  []  Cardiology/Vascular   []  Pathology    []  Records       Assessment/Plan     Active Hospital Problems    Diagnosis  POA    **Diabetic ulcer of right great toe [E11.621, L97.519]  Yes    Gram-negative bacteremia [R78.81]  Yes    Sepsis due to Gram negative bacteria [A41.50]  Yes    Mixed hyperlipidemia [E78.2]  Yes    GERD (gastroesophageal reflux disease) [K21.9]  Yes    Nausea & vomiting [R11.2]  Yes    Fever [R50.9]  Yes    Hyponatremia [E87.1]  Yes    Acute metabolic encephalopathy [G93.41]  Yes    Insulin dependent type 2 diabetes mellitus [E11.9, Z79.4]  Not Applicable    Hypertension [I10]  Yes    Diabetic peripheral neuropathy [E11.42]  Yes    Generalized anxiety disorder [F41.1]  Yes      Resolved Hospital Problems   No resolved problems to display.     59yo woman with DM2, DPN, CAROL, HTN, GERD, HLD, and DFU of right 1st toe, who presented with confusion, nausea/vomiting, and temp of 103.2 per EMS.    Right 1st toe DFU, infected  Serratia bacteremia  Gram neg sepsis  Appreciate ID and Pod attention to pt  KELI looks okay  MRI pending  Blood cultures growing Serratia sp  Still febrile  ID has changed CTX/Vanc to Cefepime  LA has normalized and mental status cleared  WBC wnl    Metabolic encephalopathy (due to  above)  Resolved with tx of infection    DM2  DPN  Uncontrolled--A1c 11.2  Continue Lantus/SSI/mealtime insulin  Restarted home Gabapentin, continue SNRI    GERD  Continue PPI    HLD  Continue statin    HTN  No hypotension, Cr fine  Continue Lisinopril    CAROL  Continue SNRI    Hyponatremia  Stable in low 130s  Stop IVFs    MIgraine DAVIDSON  Have ordered PRN Imitrex with good effect      SCDs for DVT prophylaxis.  Full code.  Discussed with patient.   Anticipate discharge TBD        Kale Villarreal MD  [facility]  03/23/24  15:09 EDT

## 2024-03-23 NOTE — PROGRESS NOTES
ID note for sepsis    Subjective: She is still complaining of intermittent fevers.  She thinks the erythema on the foot is progressing.  MRI has not yet been obtained.  She has some dysuria when the PureWick was first inserted but none since.  Initial UA was negative.    Objective:   Temp:  [97.7 °F (36.5 °C)-102.4 °F (39.1 °C)] 100.2 °F (37.9 °C)  Heart Rate:  [] 110  Resp:  [16-18] 18  BP: (105-149)/(68-82) 146/68  GENERAL: Awake and alert, in no acute distress.   HEENT: Oropharynx is clear. Hearing is grossly normal.   EYES: . No conjunctival injection. No lid lag.   LUNGS:normal respiratory effort.   SKIN: no cutaneous eruptions in exposed areas  PSYCHIATRIC: Appropriate mood, affect, insight, and judgment.   Swollen and erythematous right great toe with plantar ulcer    Chest x-ray independently interpreted: No pneumonia  White count 6.02  Creatinine 0.73  Glucose 138 through 293  3/21 blood cultures 1/2 Serratia    ASSESSMENT/PLAN:  Sepsis due to right great toe and right foot infection with Serratia septicemia  Uncontrolled type 2 diabetes -last A1c 11.2% with hyperglycemia, neuropathy and diabetic foot ulcer      Awaiting MRI.  Blood cultures growing Serratia which has not typically seen with osteoarticular infections or cellulitis but can happen.  We will change her off vancomycin and ceftriaxone to cefepime.  Repeat blood cultures have been ordered for the a.m. to document sterility of the blood

## 2024-03-23 NOTE — PLAN OF CARE
Goal Outcome Evaluation:  Plan of Care Reviewed With: patient        Progress: improving  Outcome Evaluation: only febrile this morning, antibiotics changed to cefepime for positive blood cultures, oriented x4, migraine this morning resolved, IVF stopped, MRI results pending, vss, will continue to monitor

## 2024-03-24 LAB
ALBUMIN SERPL-MCNC: 3.2 G/DL (ref 3.5–5.2)
ALBUMIN/GLOB SERPL: 1.1 G/DL
ALP SERPL-CCNC: 108 U/L (ref 39–117)
ALT SERPL W P-5'-P-CCNC: 26 U/L (ref 1–33)
ANION GAP SERPL CALCULATED.3IONS-SCNC: 9 MMOL/L (ref 5–15)
AST SERPL-CCNC: 17 U/L (ref 1–32)
BASOPHILS # BLD AUTO: 0.02 10*3/MM3 (ref 0–0.2)
BASOPHILS NFR BLD AUTO: 0.5 % (ref 0–1.5)
BILIRUB SERPL-MCNC: 0.3 MG/DL (ref 0–1.2)
BUN SERPL-MCNC: 21 MG/DL (ref 6–20)
BUN/CREAT SERPL: 26.9 (ref 7–25)
CALCIUM SPEC-SCNC: 8.2 MG/DL (ref 8.6–10.5)
CHLORIDE SERPL-SCNC: 103 MMOL/L (ref 98–107)
CO2 SERPL-SCNC: 24 MMOL/L (ref 22–29)
CREAT SERPL-MCNC: 0.78 MG/DL (ref 0.57–1)
DEPRECATED RDW RBC AUTO: 40.8 FL (ref 37–54)
EGFRCR SERPLBLD CKD-EPI 2021: 87.6 ML/MIN/1.73
EOSINOPHIL # BLD AUTO: 0.04 10*3/MM3 (ref 0–0.4)
EOSINOPHIL NFR BLD AUTO: 1.1 % (ref 0.3–6.2)
ERYTHROCYTE [DISTWIDTH] IN BLOOD BY AUTOMATED COUNT: 12.2 % (ref 12.3–15.4)
GLOBULIN UR ELPH-MCNC: 2.8 GM/DL
GLUCOSE BLDC GLUCOMTR-MCNC: 189 MG/DL (ref 70–130)
GLUCOSE BLDC GLUCOMTR-MCNC: 212 MG/DL (ref 70–130)
GLUCOSE BLDC GLUCOMTR-MCNC: 255 MG/DL (ref 70–130)
GLUCOSE BLDC GLUCOMTR-MCNC: 384 MG/DL (ref 70–130)
GLUCOSE SERPL-MCNC: 221 MG/DL (ref 65–99)
HCT VFR BLD AUTO: 34.8 % (ref 34–46.6)
HGB BLD-MCNC: 11.6 G/DL (ref 12–15.9)
IMM GRANULOCYTES # BLD AUTO: 0.01 10*3/MM3 (ref 0–0.05)
IMM GRANULOCYTES NFR BLD AUTO: 0.3 % (ref 0–0.5)
LYMPHOCYTES # BLD AUTO: 1.23 10*3/MM3 (ref 0.7–3.1)
LYMPHOCYTES NFR BLD AUTO: 33.2 % (ref 19.6–45.3)
MAGNESIUM SERPL-MCNC: 1.8 MG/DL (ref 1.6–2.6)
MCH RBC QN AUTO: 30.4 PG (ref 26.6–33)
MCHC RBC AUTO-ENTMCNC: 33.3 G/DL (ref 31.5–35.7)
MCV RBC AUTO: 91.1 FL (ref 79–97)
MONOCYTES # BLD AUTO: 0.7 10*3/MM3 (ref 0.1–0.9)
MONOCYTES NFR BLD AUTO: 18.9 % (ref 5–12)
NEUTROPHILS NFR BLD AUTO: 1.71 10*3/MM3 (ref 1.7–7)
NEUTROPHILS NFR BLD AUTO: 46 % (ref 42.7–76)
NRBC BLD AUTO-RTO: 0 /100 WBC (ref 0–0.2)
PLATELET # BLD AUTO: 171 10*3/MM3 (ref 140–450)
PMV BLD AUTO: 10.3 FL (ref 6–12)
POTASSIUM SERPL-SCNC: 3.6 MMOL/L (ref 3.5–5.2)
PROT SERPL-MCNC: 6 G/DL (ref 6–8.5)
RBC # BLD AUTO: 3.82 10*6/MM3 (ref 3.77–5.28)
SODIUM SERPL-SCNC: 136 MMOL/L (ref 136–145)
WBC NRBC COR # BLD AUTO: 3.71 10*3/MM3 (ref 3.4–10.8)

## 2024-03-24 PROCEDURE — 85025 COMPLETE CBC W/AUTO DIFF WBC: CPT | Performed by: HOSPITALIST

## 2024-03-24 PROCEDURE — 63710000001 INSULIN LISPRO (HUMAN) PER 5 UNITS: Performed by: INTERNAL MEDICINE

## 2024-03-24 PROCEDURE — 63710000001 INSULIN GLARGINE PER 5 UNITS: Performed by: INTERNAL MEDICINE

## 2024-03-24 PROCEDURE — 82948 REAGENT STRIP/BLOOD GLUCOSE: CPT

## 2024-03-24 PROCEDURE — 80053 COMPREHEN METABOLIC PANEL: CPT | Performed by: HOSPITALIST

## 2024-03-24 PROCEDURE — 87040 BLOOD CULTURE FOR BACTERIA: CPT | Performed by: INTERNAL MEDICINE

## 2024-03-24 PROCEDURE — 83735 ASSAY OF MAGNESIUM: CPT | Performed by: HOSPITALIST

## 2024-03-24 PROCEDURE — 99232 SBSQ HOSP IP/OBS MODERATE 35: CPT | Performed by: INTERNAL MEDICINE

## 2024-03-24 PROCEDURE — 25010000002 CEFEPIME PER 500 MG: Performed by: INTERNAL MEDICINE

## 2024-03-24 RX ADMIN — INSULIN LISPRO 2 UNITS: 100 INJECTION, SOLUTION INTRAVENOUS; SUBCUTANEOUS at 08:35

## 2024-03-24 RX ADMIN — CEFEPIME 2000 MG: 2 INJECTION, POWDER, FOR SOLUTION INTRAVENOUS at 11:34

## 2024-03-24 RX ADMIN — INSULIN LISPRO 5 UNITS: 100 INJECTION, SOLUTION INTRAVENOUS; SUBCUTANEOUS at 11:35

## 2024-03-24 RX ADMIN — INSULIN LISPRO 3 UNITS: 100 INJECTION, SOLUTION INTRAVENOUS; SUBCUTANEOUS at 21:38

## 2024-03-24 RX ADMIN — CEFEPIME 2000 MG: 2 INJECTION, POWDER, FOR SOLUTION INTRAVENOUS at 18:03

## 2024-03-24 RX ADMIN — INSULIN LISPRO 6 UNITS: 100 INJECTION, SOLUTION INTRAVENOUS; SUBCUTANEOUS at 11:35

## 2024-03-24 RX ADMIN — GABAPENTIN 300 MG: 300 CAPSULE ORAL at 20:04

## 2024-03-24 RX ADMIN — ROSUVASTATIN CALCIUM 10 MG: 10 TABLET, FILM COATED ORAL at 08:35

## 2024-03-24 RX ADMIN — LISINOPRIL 20 MG: 20 TABLET ORAL at 08:34

## 2024-03-24 RX ADMIN — DULOXETINE HYDROCHLORIDE 60 MG: 60 CAPSULE, DELAYED RELEASE ORAL at 08:35

## 2024-03-24 RX ADMIN — INSULIN LISPRO 4 UNITS: 100 INJECTION, SOLUTION INTRAVENOUS; SUBCUTANEOUS at 17:09

## 2024-03-24 RX ADMIN — INSULIN GLARGINE 50 UNITS: 100 INJECTION, SOLUTION SUBCUTANEOUS at 08:36

## 2024-03-24 RX ADMIN — INSULIN LISPRO 5 UNITS: 100 INJECTION, SOLUTION INTRAVENOUS; SUBCUTANEOUS at 08:36

## 2024-03-24 RX ADMIN — PANTOPRAZOLE SODIUM 40 MG: 40 TABLET, DELAYED RELEASE ORAL at 08:35

## 2024-03-24 RX ADMIN — CEFEPIME 2000 MG: 2 INJECTION, POWDER, FOR SOLUTION INTRAVENOUS at 03:20

## 2024-03-24 RX ADMIN — METFORMIN HYDROCHLORIDE 500 MG: 500 TABLET, FILM COATED ORAL at 17:09

## 2024-03-24 RX ADMIN — INSULIN LISPRO 5 UNITS: 100 INJECTION, SOLUTION INTRAVENOUS; SUBCUTANEOUS at 17:10

## 2024-03-24 NOTE — PLAN OF CARE
Goal Outcome Evaluation:  Plan of Care Reviewed With: patient        Progress: improving  Outcome Evaluation: Patient started on metformin to better control of her glucose, no fever, toerating new antibiotic, vss, MRI negative, will continue to monitor.

## 2024-03-24 NOTE — PROGRESS NOTES
ID note for sepsis    Subjective: Feels better. Tolerating abx. AF. Still swollen r 1st toe    Objective:   Temp:  [97.5 °F (36.4 °C)-98.6 °F (37 °C)] 98.2 °F (36.8 °C)  Heart Rate:  [86-90] 90  Resp:  [18-20] 18  BP: (108-149)/(62-84) 149/79  GENERAL: Awake and alert, in no acute distress.   HEENT: Oropharynx is clear. Hearing is grossly normal.   EYES: . No conjunctival injection. No lid lag.   LUNGS:normal respiratory effort.     PSYCHIATRIC: Appropriate mood, affect, insight, and judgment.   Swollen and erythematous right great toe        White count 3.7  Creatinine 0.78  Glucose 162-320  3/21 blood cultures 1/2 Serratia  3/24 Bcx P    ASSESSMENT/PLAN:  Sepsis due to right great toe and right foot infection with Serratia septicemia  Uncontrolled type 2 diabetes -last A1c 11.2% with hyperglycemia, neuropathy and diabetic foot ulcer    MRI was negative for osteo or deep infection  Cont cefepime 2g IV q8 targeting serratia, hope to transition to PO abx in next 1-2d

## 2024-03-24 NOTE — PROGRESS NOTES
Name: Roxanna Beck ADMIT: 3/21/2024   : 1965  PCP: Donato Vilchis DO    MRN: [id number] LOS: 3 days   AGE/SEX: 58 y.o. female  ROOM: [facility]     Subjective   Subjective   Feeling better again today. No subjective fever. N/V have resolved. No longer confused. Tolerating po and eating more. Voiding well. No SOA or CP or palp.       Objective   Objective   Vital Signs  Temp:  [97.5 °F (36.4 °C)-98.6 °F (37 °C)] 98.2 °F (36.8 °C)  Heart Rate:  [86-90] 90  Resp:  [18-20] 18  BP: (108-149)/(62-84) 149/79  SpO2:  [93 %-100 %] 98 %  on   ;   Device (Oxygen Therapy): room air  Body mass index is 31.48 kg/m².    (No change in exam today)    Physical Exam  Vitals and nursing note reviewed. Exam conducted with a chaperone present (RN).   Constitutional:       General: She is not in acute distress.     Appearance: She is not ill-appearing, toxic-appearing or diaphoretic.   HENT:      Head: Normocephalic.      Mouth/Throat:      Mouth: Mucous membranes are moist.      Pharynx: Oropharynx is clear.   Eyes:      General: No scleral icterus.        Right eye: No discharge.         Left eye: No discharge.      Extraocular Movements: Extraocular movements intact.      Conjunctiva/sclera: Conjunctivae normal.   Cardiovascular:      Rate and Rhythm: Normal rate and regular rhythm.      Pulses: Normal pulses.   Pulmonary:      Effort: Pulmonary effort is normal. No respiratory distress.      Breath sounds: Normal breath sounds. No wheezing or rales.   Abdominal:      General: Bowel sounds are normal. There is no distension.      Palpations: Abdomen is soft.      Tenderness: There is no abdominal tenderness.      Comments: Truncal obesity   Musculoskeletal:         General: No swelling or deformity. Normal range of motion.      Cervical back: Neck supple.      Comments: Dressing to right 1st toe   Skin:     General: Skin is warm and dry.      Capillary Refill: Capillary refill takes less than 2 seconds.      Coloration:  Skin is not jaundiced.   Neurological:      General: No focal deficit present.      Mental Status: She is alert and oriented to person, place, and time. Mental status is at baseline.      Cranial Nerves: No cranial nerve deficit.      Coordination: Coordination normal.   Psychiatric:         Mood and Affect: Mood normal.         Behavior: Behavior normal.         Thought Content: Thought content normal.       Results Review     I reviewed the patient's new clinical results.  Results from last 7 days   Lab Units 03/24/24  0402 03/23/24  0404 03/22/24 0348 03/21/24  1527   WBC 10*3/mm3 3.71 6.02 7.19 9.59   HEMOGLOBIN g/dL 11.6* 11.9* 12.0 12.2   PLATELETS 10*3/mm3 171 146 140 147     Results from last 7 days   Lab Units 03/24/24  0402 03/23/24  0404 03/22/24 0348 03/21/24  1527   SODIUM mmol/L 136 132* 134* 132*   POTASSIUM mmol/L 3.6 3.6 3.8 4.8   CHLORIDE mmol/L 103 99 98 95*   CO2 mmol/L 24.0 20.2* 21.4* 23.8   BUN mg/dL 21* 16 19 22*   CREATININE mg/dL 0.78 0.73 0.73 0.93   GLUCOSE mg/dL 221* 138* 181* 322*   EGFR mL/min/1.73 87.6 94.9 94.9 70.9     Results from last 7 days   Lab Units 03/24/24  0402 03/21/24  1527   ALBUMIN g/dL 3.2* 3.5   BILIRUBIN mg/dL 0.3 0.5   ALK PHOS U/L 108 113   AST (SGOT) U/L 17 27   ALT (SGPT) U/L 26 35*     Results from last 7 days   Lab Units 03/24/24  0402 03/23/24  0404 03/22/24  0348 03/21/24  1527   CALCIUM mg/dL 8.2* 8.3* 8.5* 8.6   ALBUMIN g/dL 3.2*  --   --  3.5   MAGNESIUM mg/dL 1.8 1.7  --   --      Results from last 7 days   Lab Units 03/21/24  1919 03/21/24  1527   PROCALCITONIN ng/mL  --  0.27*   LACTATE mmol/L 1.8 2.4*     Hemoglobin A1C   Date/Time Value Ref Range Status   03/22/2024 0953 11.20 (H) 4.80 - 5.60 % Final     Glucose   Date/Time Value Ref Range Status   03/24/2024 1102 384 (H) 70 - 130 mg/dL Final   03/24/2024 0718 189 (H) 70 - 130 mg/dL Final   03/23/2024 2104 320 (H) 70 - 130 mg/dL Final   03/23/2024 1805 271 (H) 70 - 130 mg/dL Final   03/23/2024 1139  247 (H) 70 - 130 mg/dL Final   03/23/2024 0602 162 (H) 70 - 130 mg/dL Final   03/22/2024 2111 158 (H) 70 - 130 mg/dL Final       MRI Foot Right With & Without Contrast    Result Date: 3/23/2024  Electronically signed by Juan Jose Jacobson MD on 03-23-24 at 1927     I have personally reviewed all medications:  Scheduled Medications  acetaminophen, 1,000 mg, Oral, Once  cefepime, 2,000 mg, Intravenous, Q8H  DULoxetine, 60 mg, Oral, Daily  gabapentin, 300 mg, Oral, Nightly  insulin glargine, 50 Units, Subcutaneous, Daily  insulin lispro, 2-7 Units, Subcutaneous, 4x Daily AC & at Bedtime  insulin lispro, 5 Units, Subcutaneous, TID AC  lisinopril, 20 mg, Oral, Daily  pantoprazole, 40 mg, Oral, Q AM  rosuvastatin, 10 mg, Oral, Daily    Infusions     Diet  Diet: Cardiac; Healthy Heart (2-3 Na+); Fluid Consistency: Thin (IDDSI 0)    I have personally reviewed:  [x]  Laboratory   [x]  Microbiology   [x]  Radiology   [x]  EKG/Telemetry  []  Cardiology/Vascular   []  Pathology    []  Records       Assessment/Plan     Active Hospital Problems    Diagnosis  POA    **Diabetic ulcer of right great toe [E11.621, L97.519]  Yes    Gram-negative bacteremia [R78.81]  Yes    Sepsis due to Gram negative bacteria [A41.50]  Yes    Mixed hyperlipidemia [E78.2]  Yes    GERD (gastroesophageal reflux disease) [K21.9]  Yes    Nausea & vomiting [R11.2]  Yes    Fever [R50.9]  Yes    Hyponatremia [E87.1]  Yes    Acute metabolic encephalopathy [G93.41]  Yes    Insulin dependent type 2 diabetes mellitus [E11.9, Z79.4]  Not Applicable    Hypertension [I10]  Yes    Diabetic peripheral neuropathy [E11.42]  Yes    Generalized anxiety disorder [F41.1]  Yes      Resolved Hospital Problems   No resolved problems to display.     57yo woman with DM2, DPN, CAROL, HTN, GERD, HLD, and DFU of right 1st toe, who presented with confusion, nausea/vomiting, and temp of 103.2 per EMS.    Right 1st toe DFU, infected  Serratia bacteremia  Gram neg sepsis  Appreciate ID and  Pod attention to pt  KELI looks okay  MRI neg for osteo  Blood cultures growing Serratia sp  No fever last 24h  ID has changed CTX/Vanc to Cefepime  LA has normalized and mental status cleared  WBC wnl    Metabolic encephalopathy (due to above)  Resolved with tx of infection    DM2  DPN  Uncontrolled--A1c 11.2  Continue Lantus/SSI/mealtime insulin  Sugars high/labile now that pt feeling better and eating more  Will add back Metformin with close attention to Cr and CO2  Restarted home Gabapentin, continue SNRI    GERD  Continue PPI    HLD  Continue statin    HTN  No hypotension, Cr fine  Continue Lisinopril    CAROL  Continue SNRI    Hyponatremia  Has normalized since stopping IVFs    MIgraine HA  PRN Imitrex      SCDs for DVT prophylaxis.  Full code.  Discussed with patient and RN.  Anticipate discharge TBD        Kale Villarreal MD  [facility]  03/24/24  11:29 EDT

## 2024-03-24 NOTE — PLAN OF CARE
Problem: Adult Inpatient Plan of Care  Goal: Plan of Care Review  Outcome: Ongoing, Progressing   Goal Outcome Evaluation:  VSS. KEYONNA BABIN&Ox4. MRI of right foot completed yesterday. No official orders for dressing change to right great toe ulcer, per wound follow podiatry's recommendations. Pt states home dressing change order from podiatry is betadine and gauze daily. Wound painted with betadine and wrapped with gauze last night. PRN imitrex given for headache, see mar. IV abx. Pt rested throughout the night with no other issues or complaints.

## 2024-03-25 LAB
ALBUMIN SERPL-MCNC: 3.5 G/DL (ref 3.5–5.2)
ALBUMIN/GLOB SERPL: 1.2 G/DL
ALP SERPL-CCNC: 99 U/L (ref 39–117)
ALT SERPL W P-5'-P-CCNC: 23 U/L (ref 1–33)
ANION GAP SERPL CALCULATED.3IONS-SCNC: 10 MMOL/L (ref 5–15)
AST SERPL-CCNC: 14 U/L (ref 1–32)
BACTERIA SPEC AEROBE CULT: ABNORMAL
BILIRUB SERPL-MCNC: 0.4 MG/DL (ref 0–1.2)
BUN SERPL-MCNC: 16 MG/DL (ref 6–20)
BUN/CREAT SERPL: 21.9 (ref 7–25)
CALCIUM SPEC-SCNC: 9 MG/DL (ref 8.6–10.5)
CHLORIDE SERPL-SCNC: 102 MMOL/L (ref 98–107)
CO2 SERPL-SCNC: 26 MMOL/L (ref 22–29)
CREAT SERPL-MCNC: 0.73 MG/DL (ref 0.57–1)
DEPRECATED RDW RBC AUTO: 40.9 FL (ref 37–54)
EGFRCR SERPLBLD CKD-EPI 2021: 94.9 ML/MIN/1.73
ERYTHROCYTE [DISTWIDTH] IN BLOOD BY AUTOMATED COUNT: 12.4 % (ref 12.3–15.4)
GLOBULIN UR ELPH-MCNC: 2.9 GM/DL
GLUCOSE BLDC GLUCOMTR-MCNC: 181 MG/DL (ref 70–130)
GLUCOSE BLDC GLUCOMTR-MCNC: 182 MG/DL (ref 70–130)
GLUCOSE BLDC GLUCOMTR-MCNC: 267 MG/DL (ref 70–130)
GLUCOSE BLDC GLUCOMTR-MCNC: 361 MG/DL (ref 70–130)
GLUCOSE SERPL-MCNC: 170 MG/DL (ref 65–99)
GRAM STN SPEC: ABNORMAL
HCT VFR BLD AUTO: 36.8 % (ref 34–46.6)
HGB BLD-MCNC: 12.1 G/DL (ref 12–15.9)
ISOLATED FROM: ABNORMAL
MAGNESIUM SERPL-MCNC: 1.7 MG/DL (ref 1.6–2.6)
MCH RBC QN AUTO: 29.8 PG (ref 26.6–33)
MCHC RBC AUTO-ENTMCNC: 32.9 G/DL (ref 31.5–35.7)
MCV RBC AUTO: 90.6 FL (ref 79–97)
PLATELET # BLD AUTO: 210 10*3/MM3 (ref 140–450)
PMV BLD AUTO: 9.9 FL (ref 6–12)
POTASSIUM SERPL-SCNC: 3.9 MMOL/L (ref 3.5–5.2)
PROT SERPL-MCNC: 6.4 G/DL (ref 6–8.5)
RBC # BLD AUTO: 4.06 10*6/MM3 (ref 3.77–5.28)
SODIUM SERPL-SCNC: 138 MMOL/L (ref 136–145)
WBC NRBC COR # BLD AUTO: 5.44 10*3/MM3 (ref 3.4–10.8)

## 2024-03-25 PROCEDURE — 80053 COMPREHEN METABOLIC PANEL: CPT | Performed by: HOSPITALIST

## 2024-03-25 PROCEDURE — 63710000001 INSULIN LISPRO (HUMAN) PER 5 UNITS: Performed by: INTERNAL MEDICINE

## 2024-03-25 PROCEDURE — 85027 COMPLETE CBC AUTOMATED: CPT | Performed by: HOSPITALIST

## 2024-03-25 PROCEDURE — 82948 REAGENT STRIP/BLOOD GLUCOSE: CPT

## 2024-03-25 PROCEDURE — 63710000001 INSULIN GLARGINE PER 5 UNITS: Performed by: INTERNAL MEDICINE

## 2024-03-25 PROCEDURE — 99232 SBSQ HOSP IP/OBS MODERATE 35: CPT | Performed by: INTERNAL MEDICINE

## 2024-03-25 PROCEDURE — 83735 ASSAY OF MAGNESIUM: CPT | Performed by: HOSPITALIST

## 2024-03-25 PROCEDURE — 25010000002 CEFEPIME PER 500 MG: Performed by: INTERNAL MEDICINE

## 2024-03-25 RX ADMIN — METFORMIN HYDROCHLORIDE 500 MG: 500 TABLET, FILM COATED ORAL at 17:10

## 2024-03-25 RX ADMIN — CEFEPIME 2000 MG: 2 INJECTION, POWDER, FOR SOLUTION INTRAVENOUS at 04:01

## 2024-03-25 RX ADMIN — SUMATRIPTAN SUCCINATE 50 MG: 50 TABLET ORAL at 04:53

## 2024-03-25 RX ADMIN — LISINOPRIL 20 MG: 20 TABLET ORAL at 08:50

## 2024-03-25 RX ADMIN — INSULIN LISPRO 6 UNITS: 100 INJECTION, SOLUTION INTRAVENOUS; SUBCUTANEOUS at 12:07

## 2024-03-25 RX ADMIN — METFORMIN HYDROCHLORIDE 500 MG: 500 TABLET, FILM COATED ORAL at 08:50

## 2024-03-25 RX ADMIN — DULOXETINE HYDROCHLORIDE 60 MG: 60 CAPSULE, DELAYED RELEASE ORAL at 08:50

## 2024-03-25 RX ADMIN — ROSUVASTATIN CALCIUM 10 MG: 10 TABLET, FILM COATED ORAL at 08:50

## 2024-03-25 RX ADMIN — GABAPENTIN 300 MG: 300 CAPSULE ORAL at 20:25

## 2024-03-25 RX ADMIN — INSULIN GLARGINE 50 UNITS: 100 INJECTION, SOLUTION SUBCUTANEOUS at 08:49

## 2024-03-25 RX ADMIN — PANTOPRAZOLE SODIUM 40 MG: 40 TABLET, DELAYED RELEASE ORAL at 04:53

## 2024-03-25 RX ADMIN — INSULIN LISPRO 5 UNITS: 100 INJECTION, SOLUTION INTRAVENOUS; SUBCUTANEOUS at 17:12

## 2024-03-25 RX ADMIN — SUMATRIPTAN SUCCINATE 50 MG: 50 TABLET ORAL at 21:45

## 2024-03-25 RX ADMIN — CEFEPIME 2000 MG: 2 INJECTION, POWDER, FOR SOLUTION INTRAVENOUS at 13:09

## 2024-03-25 RX ADMIN — INSULIN LISPRO 4 UNITS: 100 INJECTION, SOLUTION INTRAVENOUS; SUBCUTANEOUS at 17:11

## 2024-03-25 RX ADMIN — INSULIN LISPRO 5 UNITS: 100 INJECTION, SOLUTION INTRAVENOUS; SUBCUTANEOUS at 12:08

## 2024-03-25 RX ADMIN — INSULIN LISPRO 5 UNITS: 100 INJECTION, SOLUTION INTRAVENOUS; SUBCUTANEOUS at 08:49

## 2024-03-25 RX ADMIN — INSULIN LISPRO 2 UNITS: 100 INJECTION, SOLUTION INTRAVENOUS; SUBCUTANEOUS at 20:25

## 2024-03-25 RX ADMIN — INSULIN LISPRO 2 UNITS: 100 INJECTION, SOLUTION INTRAVENOUS; SUBCUTANEOUS at 08:47

## 2024-03-25 NOTE — PLAN OF CARE
Problem: Adult Inpatient Plan of Care  Goal: Plan of Care Review  Outcome: Ongoing, Progressing   Goal Outcome Evaluation:  VSS. KEYONNA SR. IV abx. Dressing change to right great toe done on day shift per order. Pt c/o of headache, prn Imitrex given, see mar. Pt rested throughout the night with no other issues or complaints.

## 2024-03-25 NOTE — PROGRESS NOTES
"Podiatry Progress Note      Patient: Roxanna Beck Admit Date: 2024    Age: 58 y.o.   PCP: Donato Vilchis DO    MRN: [id number]  Room: [facility]/        Subjective     Chief Complaint     Chief Complaint   Patient presents with    Altered Mental Status        HPI     Patient resting in bed, states she has been feeling much better. States her fever broke about 3 days ago. Right toe is wrapped.    Past Medical History     Past Medical History:   Diagnosis Date    Arthritis     Depression     Diabetes mellitus     checks sugar once per day     Dizzy     Fibromyalgia     GERD (gastroesophageal reflux disease)     History of kidney stones     HNP (herniated nucleus pulposus)     Hyperlipidemia     Hypertension     Nausea & vomiting 3/22/2024    Wears glasses     readers        Past Surgical History:   Procedure Laterality Date    BACK SURGERY      L4-5 Discectomy    CARPAL TUNNEL RELEASE      left     SECTION      CHOLECYSTECTOMY      COLONOSCOPY      ENDOSCOPY      HYSTERECTOMY      KIDNEY STONE SURGERY      LUMBAR DISCECTOMY N/A 12/15/2016    Procedure: LUMBAR DISCECTOMY L3-4;  Surgeon: Sandro Cuenca MD;  Location: [facility];  Service:     OOPHORECTOMY      TONSILLECTOMY      WISDOM TOOTH EXTRACTION          Allergies   Allergen Reactions    Codeine Nausea And Vomiting    Percocet [Oxycodone-Acetaminophen] Nausea And Vomiting     Caused bad pain    Adhesive Tape Rash    Toradol [Ketorolac Tromethamine] Other (See Comments)     \"doesnt do anything\"   - DOESN'T WORK PER PATIENT        Social History     Tobacco Use   Smoking Status Never   Smokeless Tobacco Never        Objective   Physical Exam    Vitals:    24 1340   BP: 128/82   Pulse: 94   Resp: 18   Temp: 98.8 °F (37.1 °C)   SpO2: 100%        Dermatology: ulceration to the medial right hallux has hyperkeratotic rim, no drainage present. Probe to bone. Periwound erythma, moving proximally by 1st metatarsal. The erythema has extended " plantarly, slightly progressed     Vascular: DP and PT pulses palpable     Neurological: light touch and protective sensation diminished     Musckuloskeletal: 1st MTPJ and AJ ROM normal, no crepitus    Labs     Lab Results   Component Value Date    HGBA1C 11.20 (H) 03/22/2024    POCGLU 361 (H) 03/25/2024    SEDRATE 28 03/22/2024        CBC:      Lab 03/25/24  0329 03/24/24  0402 03/23/24  0404 03/22/24  0348 03/21/24  1527   WBC 5.44 3.71 6.02 7.19 9.59   HEMOGLOBIN 12.1 11.6* 11.9* 12.0 12.2   HEMATOCRIT 36.8 34.8 35.8 36.5 37.1   PLATELETS 210 171 146 140 147   NEUTROS ABS  --  1.71  --   --  7.93*   IMMATURE GRANS (ABS)  --  0.01  --   --   --    LYMPHS ABS  --  1.23  --   --  0.60*   MONOS ABS  --  0.70  --   --  0.98*   EOS ABS  --  0.04  --   --  0.00   MCV 90.6 91.1 90.6 91.9 89.4          Results for orders placed or performed during the hospital encounter of 03/21/24   Blood Culture - Blood, Hand, Right    Specimen: Hand, Right; Blood   Result Value Ref Range    Blood Culture No growth at 24 hours         MRI Foot Right With & Without Contrast  Narrative: Patient: MARIELA CARR  Time Out: 19:27  Exam(s): MRI RIGHT FOOT W WO Contrast     EXAM:    MR Right Lower Extremity Without and With Intravenous Contrast, Foot    CLINICAL HISTORY:     Reason for exam: Foot swelling, diabetic, osteomyelitis suspected,   xray done.    TECHNIQUE:    Multiplanar magnetic resonance images of the right foot without and   with intravenous contrast.    COMPARISON:    No relevant prior studies available.    FINDINGS:  Soft tissue ulcer along the plantar first digit.  Normal marrow signal.    No osteomyelitis.  No septic arthritis.  No tenosynovitis.  No abscess.    No acute fracture.    IMPRESSION:         First digit ulcer.  No osteomyelitis.  Impression: Electronically signed by Juan Jose Jacobson MD on 03-23-24 at 1927       Assessment/Plan     57yo F with right foot diabetic foot ulcer, cellulitis    -Pt examined and  evaluated  -Labs reviewed; much improved  -MRI reviewed; no signs of OM  -I agree with other providers, the cellulitis/soft tissue infection of the foot appears somewhat concerning. Appears that her systemic infection may not have been from her foot. She may benefit from tailored IV vs PO abx. I will discuss with Dr. De Souza.  -We also discussed that she may need outpatient debridement for the ulcer  -Patient is OK for dc from my standpoint    Please call with questions      López Zapata DPM  Office: [phone]

## 2024-03-25 NOTE — PLAN OF CARE
Goal Outcome Evaluation:         Patient A&Ox4, RA, SR, VSS, no fever reported. Continues with IV Abt, wound dressing changed. I spoke with podiatry and he is okay to discharge patient. Plan of care ongoing.

## 2024-03-25 NOTE — CASE MANAGEMENT/SOCIAL WORK
Discharge Planning Assessment  [facility]     Patient Name: Roxanna Beck  MRN: [id number]  Today's Date: 3/25/2024    Admit Date: 3/21/2024    Plan: Home via SO, [facility] HH referral pending   Discharge Needs Assessment       Row Name 03/25/24 1415       Living Environment    People in Home grandchild(charly);significant other    Current Living Arrangements apartment    Family Caregiver if Needed significant other;other (see comments)    Quality of Family Relationships helpful;involved;supportive    Able to Return to Prior Arrangements yes       Transition Planning    Patient/Family Anticipates Transition to home with family    Patient/Family Anticipated Services at Transition     Transportation Anticipated family or friend will provide       Discharge Needs Assessment    Equipment Currently Used at Home walker, rolling    Concerns to be Addressed discharge planning    Anticipated Changes Related to Illness none    Equipment Needed After Discharge none    Outpatient/Agency/Support Group Needs homecare agency    Discharge Facility/Level of Care Needs home with home health                   Discharge Plan       Row Name 03/25/24 1416       Plan    Plan Home via SO, [facility] HH referral pending    Patient/Family in Agreement with Plan yes    Plan Comments CCP met with pt and SO Dmitriy at the bedside, introduced self and role of CCP. Pt gave CCP permission to speak in front of Dmitriy. Face sheet information and pharmacy verified. Pt lives in a single-story apartment with Dmitriy and her grandson. Pt still drives, IADL's and has a walker. Pt denies having a living will. Pt is enrolled in meds to beds and denies trouble affording or managing her medications. Pt denies HH or SNF history. CCP discussed PT recs for SNF vs Home with assist. Pt declines SNF referrals and states she will have Dmitriy and her grandson to assist at home. CCP discussed HH referral; pt is agreeable to HH referral and declines preference of  agency. [employer]  referral placed and notified. DC plan is home, Dmitriy to transport, [facility] HH referral pending. Lydia RN/CCP                  Continued Care and Services - Admitted Since 3/21/2024       Home Medical Care       Service Provider Request Status Selected Services Address Phone Fax Patient Preferred    [facility] Pending - Request Sent N/A [address] [address] [phone] --                  Expected Discharge Date and Time       Expected Discharge Date Expected Discharge Time    Mar 26, 2024            Demographic Summary    No documentation.                  Functional Status       Row Name 03/25/24 1415       Functional Status    Usual Activity Tolerance good    Current Activity Tolerance moderate       Assessment of Health Literacy    How often do you have someone help you read hospital materials? Never    How often do you have problems learning about your medical condition because of difficulty understanding written information? Never    How often do you have a problem understanding what is told to you about your medical condition? Never    How confident are you filling out medical forms by yourself? Quite a bit    Health Literacy Good       Functional Status, IADL    Medications independent    Meal Preparation independent    Housekeeping independent    Laundry independent    Shopping independent                               Dior Giraldo, VIOLET

## 2024-03-25 NOTE — DISCHARGE PLACEMENT REQUEST
"Mraiela Beck (58 y.o. Female)       Date of Birth   1965    Social Security Number       Address   [address] [address]    Home Phone       MRN   [id number]       Mandaen   [facility]    Marital Status   Single                            Admission Date   3/21/24    Admission Type   Emergency    Admitting Provider   Victoria Roche MD    Attending Provider   Kale Villarreal MD    Department, Room/Bed   [address], [address]       Discharge Date       Discharge Disposition       Discharge Destination                                 Attending Provider: Kale Villarreal MD    Allergies: Codeine, Percocet [Oxycodone-acetaminophen], Adhesive Tape, Toradol [Ketorolac Tromethamine]    Isolation: None   Infection: None   Code Status: CPR    Ht: 162.6 cm (64\")   Wt: 81.6 kg (179 lb 14.4 oz)    Admission Cmt: None   Principal Problem: Diabetic ulcer of right great toe [E11.621,L97.519]                   Active Insurance as of 3/21/2024       Primary Coverage       Payor Plan Insurance Group Employer/Plan Group    ANTH MEDICAID [facility] MEDICAID [id number]       Payor Plan Address Payor Plan Phone Number Payor Plan Fax Number Effective Dates    [address] [address] [phone]  1/1/2024 - None Entered    [facility] [address]         Subscriber Name Subscriber Birth Date Member ID       MARIELA BECK 1965 [id number]                     Emergency Contacts        (Rel.) Home Phone Work Phone Mobile Phone    Sarah Zayas (Sister) -- -- [phone]    PIERO ARROYO (Daughter) -- -- [phone]    Barb Beck (Brother) [phone] -- --    BERTHA TRIPP (Significant Other) -- -- [phone]                "

## 2024-03-25 NOTE — SIGNIFICANT NOTE
03/25/24 1613   OTHER   Discipline physical therapist   Rehab Time/Intention   Session Not Performed patient/family declined treatment;other (see comments)  (Educated on importance of PT. Pt reports feeling safe to d/c home tomorrow without treatment this PM.)   Recommendation   PT - Next Appointment 03/26/24

## 2024-03-25 NOTE — PROGRESS NOTES
Name: Roxanna Beck ADMIT: 3/21/2024   : 1965  PCP: Donato Vilchis DO    MRN: [id number] LOS: 4 days   AGE/SEX: 58 y.o. female  ROOM: [facility]     Subjective   Subjective   Feeling better again today. No subjective fever. N/V have resolved. No longer confused. Tolerating po and eating more. Voiding well. No SOA or CP or palp.       Objective   Objective   Vital Signs  Temp:  [98.2 °F (36.8 °C)-99 °F (37.2 °C)] 99 °F (37.2 °C)  Heart Rate:  [86-95] 95  Resp:  [18] 18  BP: (129-137)/(73-94) 137/94  SpO2:  [99 %-100 %] 99 %  on   ;   Device (Oxygen Therapy): room air  Body mass index is 30.88 kg/m².    (No change in exam today)    Physical Exam  Vitals and nursing note reviewed.   Constitutional:       General: She is not in acute distress.     Appearance: She is not ill-appearing, toxic-appearing or diaphoretic.   HENT:      Head: Normocephalic.      Mouth/Throat:      Mouth: Mucous membranes are moist.      Pharynx: Oropharynx is clear.   Eyes:      General: No scleral icterus.        Right eye: No discharge.         Left eye: No discharge.      Extraocular Movements: Extraocular movements intact.      Conjunctiva/sclera: Conjunctivae normal.   Cardiovascular:      Rate and Rhythm: Normal rate and regular rhythm.      Pulses: Normal pulses.   Pulmonary:      Effort: Pulmonary effort is normal. No respiratory distress.      Breath sounds: Normal breath sounds. No wheezing or rales.   Abdominal:      General: Bowel sounds are normal. There is no distension.      Palpations: Abdomen is soft.      Tenderness: There is no abdominal tenderness.      Comments: Truncal obesity   Musculoskeletal:         General: No swelling or deformity. Normal range of motion.      Cervical back: Neck supple.      Comments: Dressing to right 1st toe   Skin:     General: Skin is warm and dry.      Capillary Refill: Capillary refill takes less than 2 seconds.      Coloration: Skin is not jaundiced.   Neurological:       General: No focal deficit present.      Mental Status: She is alert and oriented to person, place, and time. Mental status is at baseline.      Cranial Nerves: No cranial nerve deficit.      Coordination: Coordination normal.   Psychiatric:         Mood and Affect: Mood normal.         Behavior: Behavior normal.         Thought Content: Thought content normal.       Results Review     I reviewed the patient's new clinical results.  Results from last 7 days   Lab Units 03/25/24  0329 03/24/24 0402 03/23/24  0404 03/22/24  0348   WBC 10*3/mm3 5.44 3.71 6.02 7.19   HEMOGLOBIN g/dL 12.1 11.6* 11.9* 12.0   PLATELETS 10*3/mm3 210 171 146 140     Results from last 7 days   Lab Units 03/25/24 0329 03/24/24 0402 03/23/24  0404 03/22/24  0348   SODIUM mmol/L 138 136 132* 134*   POTASSIUM mmol/L 3.9 3.6 3.6 3.8   CHLORIDE mmol/L 102 103 99 98   CO2 mmol/L 26.0 24.0 20.2* 21.4*   BUN mg/dL 16 21* 16 19   CREATININE mg/dL 0.73 0.78 0.73 0.73   GLUCOSE mg/dL 170* 221* 138* 181*   EGFR mL/min/1.73 94.9 87.6 94.9 94.9     Results from last 7 days   Lab Units 03/25/24  0329 03/24/24  0402 03/21/24  1527   ALBUMIN g/dL 3.5 3.2* 3.5   BILIRUBIN mg/dL 0.4 0.3 0.5   ALK PHOS U/L 99 108 113   AST (SGOT) U/L 14 17 27   ALT (SGPT) U/L 23 26 35*     Results from last 7 days   Lab Units 03/25/24  0329 03/24/24  0402 03/23/24  0404 03/22/24  0348 03/21/24  1527   CALCIUM mg/dL 9.0 8.2* 8.3* 8.5* 8.6   ALBUMIN g/dL 3.5 3.2*  --   --  3.5   MAGNESIUM mg/dL 1.7 1.8 1.7  --   --      Results from last 7 days   Lab Units 03/21/24  1919 03/21/24  1527   PROCALCITONIN ng/mL  --  0.27*   LACTATE mmol/L 1.8 2.4*     Glucose   Date/Time Value Ref Range Status   03/25/2024 1115 361 (H) 70 - 130 mg/dL Final   03/25/2024 0638 181 (H) 70 - 130 mg/dL Final   03/24/2024 2107 212 (H) 70 - 130 mg/dL Final   03/24/2024 1613 255 (H) 70 - 130 mg/dL Final   03/24/2024 1102 384 (H) 70 - 130 mg/dL Final   03/24/2024 0718 189 (H) 70 - 130 mg/dL Final   03/23/2024  2104 320 (H) 70 - 130 mg/dL Final       MRI Foot Right With & Without Contrast    Result Date: 3/23/2024  Electronically signed by Juan Jose Jacobson MD on 03-23-24 at 1927     I have personally reviewed all medications:  Scheduled Medications  acetaminophen, 1,000 mg, Oral, Once  cefepime, 2,000 mg, Intravenous, Q8H  DULoxetine, 60 mg, Oral, Daily  gabapentin, 300 mg, Oral, Nightly  insulin glargine, 50 Units, Subcutaneous, Daily  insulin lispro, 2-7 Units, Subcutaneous, 4x Daily AC & at Bedtime  insulin lispro, 5 Units, Subcutaneous, TID AC  lisinopril, 20 mg, Oral, Daily  metFORMIN, 500 mg, Oral, BID With Meals  pantoprazole, 40 mg, Oral, Q AM  rosuvastatin, 10 mg, Oral, Daily    Infusions     Diet  Diet: Cardiac; Healthy Heart (2-3 Na+); Fluid Consistency: Thin (IDDSI 0)    I have personally reviewed:  [x]  Laboratory   [x]  Microbiology   []  Radiology   [x]  EKG/Telemetry  []  Cardiology/Vascular   []  Pathology    []  Records       Assessment/Plan     Active Hospital Problems    Diagnosis  POA    **Diabetic ulcer of right great toe [E11.621, L97.519]  Yes    Gram-negative bacteremia [R78.81]  Yes    Sepsis due to Gram negative bacteria [A41.50]  Yes    Mixed hyperlipidemia [E78.2]  Yes    GERD (gastroesophageal reflux disease) [K21.9]  Yes    Nausea & vomiting [R11.2]  Yes    Fever [R50.9]  Yes    Hyponatremia [E87.1]  Yes    Acute metabolic encephalopathy [G93.41]  Yes    Insulin dependent type 2 diabetes mellitus [E11.9, Z79.4]  Not Applicable    Hypertension [I10]  Yes    Diabetic peripheral neuropathy [E11.42]  Yes    Generalized anxiety disorder [F41.1]  Yes      Resolved Hospital Problems   No resolved problems to display.     57yo woman with DM2, DPN, CAROL, HTN, GERD, HLD, and DFU of right 1st toe, who presented with confusion, nausea/vomiting, and temp of 103.2 per EMS.    Right 1st toe DFU, infected  Serratia bacteremia  Gram neg sepsis  Appreciate ID and Pod attention to pt  KELI looks okay  MRI neg for  osteo  Blood cultures growing Serratia sp  No fever last 48h  ID has changed CTX/Vanc to Cefepime  LA has normalized and mental status cleared  WBC wnl  Await Pod input today    Metabolic encephalopathy (due to above)  Resolved with tx of infection    DM2  DPN  Uncontrolled--A1c 11.2  Continue Lantus/SSI/mealtime insulin  Sugars high/labile now that pt feeling better and eating more  Added back Metformin, will increase to 1000mg in AM and 500mg in PM  Continue to pay close attention to Cr and CO2  Restarted home Gabapentin, continue SNRI    GERD  Continue PPI    HLD  Continue statin    HTN  No hypotension, Cr fine  Continue Lisinopril    CAROL  Continue SNRI    Hyponatremia  Has normalized since stopping IVFs    MIgraine HA  PRN Imitrex      SCDs for DVT prophylaxis.  Full code.  Discussed with patient, CCP, Pharm, and RN at morning huddle.  Anticipate discharge TBD        Kale Villarreal MD  [facility]  03/25/24  12:21 EDT

## 2024-03-25 NOTE — PROGRESS NOTES
ID PROGRESS NOTE    CC: f/u R great toe infection and Serratia septicemia    Subj: No acute events.  No fever.  The foot is slightly improved but certainly infection has not yet resolved.  She is tolerating cefepime without rash or diarrhea.  Her main request today is to take a shower.    Medications:    Current Facility-Administered Medications:     acetaminophen (TYLENOL) tablet 1,000 mg, 1,000 mg, Oral, Once, [place] diaz MD    acetaminophen (TYLENOL) tablet 650 mg, 650 mg, Oral, Q4H PRN, Victoria Roche MD, 650 mg at 03/23/24 0652    sennosides-docusate (PERICOLACE) 8.6-50 MG per tablet 2 tablet, 2 tablet, Oral, BID PRN **AND** polyethylene glycol (MIRALAX) packet 17 g, 17 g, Oral, Daily PRN **AND** bisacodyl (DULCOLAX) EC tablet 5 mg, 5 mg, Oral, Daily PRN **AND** bisacodyl (DULCOLAX) suppository 10 mg, 10 mg, Rectal, Daily PRN, Victoria Roche MD    cefepime 2000 mg IVPB in 100 mL NS (MBP), 2,000 mg, Intravenous, Q8H, Jerrell Gaines MD, 2,000 mg at 03/25/24 0401    dextrose (D50W) (25 g/50 mL) IV injection 25 g, 25 g, Intravenous, Q15 Min PRN, Victoria Roche MD    dextrose (GLUTOSE) oral gel 15 g, 15 g, Oral, Q15 Min PRN, Victoria Roche MD    DULoxetine (CYMBALTA) DR capsule 60 mg, 60 mg, Oral, Daily, Victoria Roche MD, 60 mg at 03/25/24 0850    gabapentin (NEURONTIN) capsule 300 mg, 300 mg, Oral, Nightly, Kale Villarreal MD, 300 mg at 03/24/24 2004    glucagon (GLUCAGEN) injection 1 mg, 1 mg, Intramuscular, Q15 Min PRN, Victoria Roche MD    HYDROcodone-acetaminophen (NORCO) 5-325 MG per tablet 1 tablet, 1 tablet, Oral, Q6H PRN, Kale Villarreal MD, 1 tablet at 03/23/24 0743    insulin glargine (LANTUS, SEMGLEE) injection 50 Units, 50 Units, Subcutaneous, Daily, Vicotria Roche MD, 50 Units at 03/25/24 0849    insulin lispro (HUMALOG/ADMELOG) injection 2-7 Units, 2-7 Units, Subcutaneous, 4x Daily AC & at Bedtime, Victoria Roche MD, 2  Units at 03/25/24 0847    insulin lispro (HUMALOG/ADMELOG) injection 5 Units, 5 Units, Subcutaneous, TID AC, Victoria Roche MD, 5 Units at 03/25/24 0849    lisinopril (PRINIVIL,ZESTRIL) tablet 20 mg, 20 mg, Oral, Daily, Victoria Roche MD, 20 mg at 03/25/24 0850    melatonin tablet 3 mg, 3 mg, Oral, Nightly PRN, Victoria Roche MD    metFORMIN (GLUCOPHAGE) tablet 500 mg, 500 mg, Oral, BID With Meals, Kale Villarreal MD, 500 mg at 03/25/24 0850    ondansetron ODT (ZOFRAN-ODT) disintegrating tablet 4 mg, 4 mg, Oral, Q6H PRN, 4 mg at 03/23/24 0755 **OR** ondansetron (ZOFRAN) injection 4 mg, 4 mg, Intravenous, Q6H PRN, Victoria Roche MD, 4 mg at 03/22/24 0023    pantoprazole (PROTONIX) EC tablet 40 mg, 40 mg, Oral, Q AM, Victoria Roche MD, 40 mg at 03/25/24 0453    rosuvastatin (CRESTOR) tablet 10 mg, 10 mg, Oral, Daily, Victoria Roche MD, 10 mg at 03/25/24 0850    SUMAtriptan (IMITREX) tablet 50 mg, 50 mg, Oral, Q2H PRN, Kale Villarreal MD, 50 mg at 03/25/24 0453      Objective:   Temp:  [98.2 °F (36.8 °C)-99 °F (37.2 °C)] 99 °F (37.2 °C)  Heart Rate:  [86-95] 95  Resp:  [18] 18  BP: (129-137)/(73-94) 137/94    GENERAL: Awake and alert, in no acute distress,, very nice, sitting on side of bed  EYES: . No scleral icterus  CV: Normal rate  LUNGS:normal respiratory effort on room air  MSK: Right great toe is edematous and erythematous    Labs:  CBC, BMP, and blood cultures reviewed today  Lab Results   Component Value Date    WBC 5.44 03/25/2024    HGB 12.1 03/25/2024    HCT 36.8 03/25/2024    MCV 90.6 03/25/2024     03/25/2024     Lab Results   Component Value Date    GLUCOSE 170 (H) 03/25/2024    CALCIUM 9.0 03/25/2024     03/25/2024    K 3.9 03/25/2024    CO2 26.0 03/25/2024     03/25/2024    BUN 16 03/25/2024    CREATININE 0.73 03/25/2024    EGFR 94.9 03/25/2024    BCR 21.9 03/25/2024    ANIONGAP 10.0 03/25/2024     Lab Results   Component Value Date     CRP 13.59 (H) 03/22/2024     Lab Results   Component Value Date    HGBA1C 11.20 (H) 03/22/2024       Microbiology:  3/21 blood cultures 1/2 Serratia (susceptible to ceftriaxone, cefepime, levofloxacin, Bactrim)  3/24 Bcx: Negative to date    Radiology:  The MRI right foot shows a first digit ulcer but no osteomyelitis or abscess    ASSESSMENT/PLAN:  Sepsis due to right great toe and right foot infection with Serratia septicemia  Uncontrolled type 2 diabetes -last A1c 11.2% with hyperglycemia, neuropathy and diabetic foot ulcer    She is afebrile with normal white blood cell count.  Her repeat blood cultures are negative to date.  The toe is slightly improved though there is obviously infection still present.  Surprisingly her MRI did not show any evidence of deep infection.  I will follow-up podiatry evaluation today specifically as it relates to the need for surgical intervention.  I should be able to provide antibiotic recommendations once surgical plans are known.      Check CBC, BMP, and CRP in a.m. for close monitoring.    ID will follow.

## 2024-03-26 ENCOUNTER — TRANSCRIBE ORDERS (OUTPATIENT)
Dept: HOME HEALTH SERVICES | Facility: HOME HEALTHCARE | Age: 59
End: 2024-03-26
Payer: MEDICAID

## 2024-03-26 ENCOUNTER — DOCUMENTATION (OUTPATIENT)
Dept: HOME HEALTH SERVICES | Facility: HOME HEALTHCARE | Age: 59
End: 2024-03-26
Payer: MEDICAID

## 2024-03-26 ENCOUNTER — HOME HEALTH ADMISSION (OUTPATIENT)
Dept: HOME HEALTH SERVICES | Facility: HOME HEALTHCARE | Age: 59
End: 2024-03-26
Payer: MEDICAID

## 2024-03-26 ENCOUNTER — READMISSION MANAGEMENT (OUTPATIENT)
Dept: CALL CENTER | Facility: HOSPITAL | Age: 59
End: 2024-03-26
Payer: MEDICAID

## 2024-03-26 VITALS
BODY MASS INDEX: 31.28 KG/M2 | OXYGEN SATURATION: 97 % | HEIGHT: 64 IN | SYSTOLIC BLOOD PRESSURE: 133 MMHG | DIASTOLIC BLOOD PRESSURE: 85 MMHG | TEMPERATURE: 97.5 F | WEIGHT: 183.2 LBS | RESPIRATION RATE: 18 BRPM | HEART RATE: 98 BPM

## 2024-03-26 DIAGNOSIS — R78.81 BACTEREMIA: ICD-10-CM

## 2024-03-26 DIAGNOSIS — E11.621 DIABETIC ULCER OF RIGHT FOOT ASSOCIATED WITH TYPE 2 DIABETES MELLITUS, UNSPECIFIED PART OF FOOT, UNSPECIFIED ULCER STAGE: ICD-10-CM

## 2024-03-26 DIAGNOSIS — Z79.4 TYPE 2 DIABETES MELLITUS WITH DIABETIC NEUROPATHY, WITH LONG-TERM CURRENT USE OF INSULIN: ICD-10-CM

## 2024-03-26 DIAGNOSIS — Z79.4 TYPE 2 DIABETES MELLITUS WITH HYPERGLYCEMIA, WITH LONG-TERM CURRENT USE OF INSULIN: ICD-10-CM

## 2024-03-26 DIAGNOSIS — E11.65 TYPE 2 DIABETES MELLITUS WITH HYPERGLYCEMIA, WITH LONG-TERM CURRENT USE OF INSULIN: ICD-10-CM

## 2024-03-26 DIAGNOSIS — L08.9 RIGHT FOOT INFECTION: Primary | ICD-10-CM

## 2024-03-26 DIAGNOSIS — L97.519 DIABETIC ULCER OF RIGHT FOOT ASSOCIATED WITH TYPE 2 DIABETES MELLITUS, UNSPECIFIED PART OF FOOT, UNSPECIFIED ULCER STAGE: ICD-10-CM

## 2024-03-26 DIAGNOSIS — E11.40 TYPE 2 DIABETES MELLITUS WITH DIABETIC NEUROPATHY, WITH LONG-TERM CURRENT USE OF INSULIN: ICD-10-CM

## 2024-03-26 LAB
ANION GAP SERPL CALCULATED.3IONS-SCNC: 11 MMOL/L (ref 5–15)
BACTERIA SPEC AEROBE CULT: NORMAL
BUN SERPL-MCNC: 16 MG/DL (ref 6–20)
BUN/CREAT SERPL: 17.6 (ref 7–25)
CALCIUM SPEC-SCNC: 9 MG/DL (ref 8.6–10.5)
CHLORIDE SERPL-SCNC: 102 MMOL/L (ref 98–107)
CO2 SERPL-SCNC: 24 MMOL/L (ref 22–29)
CREAT SERPL-MCNC: 0.91 MG/DL (ref 0.57–1)
CRP SERPL-MCNC: 3.48 MG/DL (ref 0–0.5)
DEPRECATED RDW RBC AUTO: 40.7 FL (ref 37–54)
EGFRCR SERPLBLD CKD-EPI 2021: 72.8 ML/MIN/1.73
ERYTHROCYTE [DISTWIDTH] IN BLOOD BY AUTOMATED COUNT: 12.3 % (ref 12.3–15.4)
GLUCOSE BLDC GLUCOMTR-MCNC: 175 MG/DL (ref 70–130)
GLUCOSE BLDC GLUCOMTR-MCNC: 283 MG/DL (ref 70–130)
GLUCOSE SERPL-MCNC: 152 MG/DL (ref 65–99)
HCT VFR BLD AUTO: 36.7 % (ref 34–46.6)
HGB BLD-MCNC: 12.2 G/DL (ref 12–15.9)
MCH RBC QN AUTO: 30 PG (ref 26.6–33)
MCHC RBC AUTO-ENTMCNC: 33.2 G/DL (ref 31.5–35.7)
MCV RBC AUTO: 90.2 FL (ref 79–97)
PLATELET # BLD AUTO: 255 10*3/MM3 (ref 140–450)
PMV BLD AUTO: 9.7 FL (ref 6–12)
POTASSIUM SERPL-SCNC: 3.8 MMOL/L (ref 3.5–5.2)
QT INTERVAL: 372 MS
QTC INTERVAL: 463 MS
RBC # BLD AUTO: 4.07 10*6/MM3 (ref 3.77–5.28)
SODIUM SERPL-SCNC: 137 MMOL/L (ref 136–145)
WBC NRBC COR # BLD AUTO: 7.36 10*3/MM3 (ref 3.4–10.8)

## 2024-03-26 PROCEDURE — 25010000002 CEFEPIME PER 500 MG: Performed by: INTERNAL MEDICINE

## 2024-03-26 PROCEDURE — 82948 REAGENT STRIP/BLOOD GLUCOSE: CPT

## 2024-03-26 PROCEDURE — 63710000001 INSULIN LISPRO (HUMAN) PER 5 UNITS: Performed by: INTERNAL MEDICINE

## 2024-03-26 PROCEDURE — 80048 BASIC METABOLIC PNL TOTAL CA: CPT | Performed by: INTERNAL MEDICINE

## 2024-03-26 PROCEDURE — 63710000001 INSULIN GLARGINE PER 5 UNITS: Performed by: INTERNAL MEDICINE

## 2024-03-26 PROCEDURE — 93005 ELECTROCARDIOGRAM TRACING: CPT | Performed by: INTERNAL MEDICINE

## 2024-03-26 PROCEDURE — 99232 SBSQ HOSP IP/OBS MODERATE 35: CPT | Performed by: INTERNAL MEDICINE

## 2024-03-26 PROCEDURE — 93010 ELECTROCARDIOGRAM REPORT: CPT | Performed by: INTERNAL MEDICINE

## 2024-03-26 PROCEDURE — 85027 COMPLETE CBC AUTOMATED: CPT | Performed by: INTERNAL MEDICINE

## 2024-03-26 PROCEDURE — 86140 C-REACTIVE PROTEIN: CPT | Performed by: INTERNAL MEDICINE

## 2024-03-26 RX ORDER — CIPROFLOXACIN 750 MG/1
750 TABLET, FILM COATED ORAL EVERY 12 HOURS SCHEDULED
Qty: 28 TABLET | Refills: 0 | Status: SHIPPED | OUTPATIENT
Start: 2024-03-26 | End: 2024-04-09

## 2024-03-26 RX ORDER — CIPROFLOXACIN 750 MG/1
750 TABLET, FILM COATED ORAL EVERY 12 HOURS SCHEDULED
Status: DISCONTINUED | OUTPATIENT
Start: 2024-03-26 | End: 2024-03-26 | Stop reason: HOSPADM

## 2024-03-26 RX ORDER — AMITRIPTYLINE HYDROCHLORIDE 50 MG/1
50 TABLET, FILM COATED ORAL NIGHTLY
Qty: 30 TABLET | Refills: 0 | Status: SHIPPED | OUTPATIENT
Start: 2024-03-26

## 2024-03-26 RX ORDER — HYDROCODONE BITARTRATE AND ACETAMINOPHEN 5; 325 MG/1; MG/1
1 TABLET ORAL EVERY 6 HOURS PRN
Qty: 12 TABLET | Refills: 0 | Status: SHIPPED | OUTPATIENT
Start: 2024-03-26 | End: 2024-03-29

## 2024-03-26 RX ADMIN — INSULIN LISPRO 5 UNITS: 100 INJECTION, SOLUTION INTRAVENOUS; SUBCUTANEOUS at 12:11

## 2024-03-26 RX ADMIN — PANTOPRAZOLE SODIUM 40 MG: 40 TABLET, DELAYED RELEASE ORAL at 06:33

## 2024-03-26 RX ADMIN — INSULIN LISPRO 2 UNITS: 100 INJECTION, SOLUTION INTRAVENOUS; SUBCUTANEOUS at 08:25

## 2024-03-26 RX ADMIN — INSULIN GLARGINE 50 UNITS: 100 INJECTION, SOLUTION SUBCUTANEOUS at 08:29

## 2024-03-26 RX ADMIN — INSULIN LISPRO 4 UNITS: 100 INJECTION, SOLUTION INTRAVENOUS; SUBCUTANEOUS at 12:09

## 2024-03-26 RX ADMIN — CEFEPIME 2000 MG: 2 INJECTION, POWDER, FOR SOLUTION INTRAVENOUS at 02:47

## 2024-03-26 RX ADMIN — CIPROFLOXACIN HYDROCHLORIDE 750 MG: 750 TABLET, FILM COATED ORAL at 12:11

## 2024-03-26 RX ADMIN — ROSUVASTATIN CALCIUM 10 MG: 10 TABLET, FILM COATED ORAL at 08:22

## 2024-03-26 RX ADMIN — HYDROCODONE BITARTRATE AND ACETAMINOPHEN 1 TABLET: 5; 325 TABLET ORAL at 08:35

## 2024-03-26 RX ADMIN — LISINOPRIL 20 MG: 20 TABLET ORAL at 08:22

## 2024-03-26 RX ADMIN — METFORMIN HYDROCHLORIDE 1000 MG: 1000 TABLET, FILM COATED ORAL at 08:22

## 2024-03-26 RX ADMIN — DULOXETINE HYDROCHLORIDE 60 MG: 60 CAPSULE, DELAYED RELEASE ORAL at 08:22

## 2024-03-26 RX ADMIN — INSULIN LISPRO 5 UNITS: 100 INJECTION, SOLUTION INTRAVENOUS; SUBCUTANEOUS at 08:28

## 2024-03-26 NOTE — CASE MANAGEMENT/SOCIAL WORK
Case Management Discharge Note      Final Note: Discharge to home with family support and [facility]. Pushpa KELLER RN CCP         Selected Continued Care - Admitted Since 3/21/2024       Destination    No services have been selected for the patient.                Durable Medical Equipment    No services have been selected for the patient.                Dialysis/Infusion    No services have been selected for the patient.                Home Medical Care       Service Provider Selected Services Address Phone Fax Patient Preferred     [facility] [address] [address] [phone] --              Therapy    No services have been selected for the patient.                Community Resources    No services have been selected for the patient.                Community & DME    No services have been selected for the patient.                    Transportation Services  Private: Car    Final Discharge Disposition Code: 06 - home with home health care

## 2024-03-26 NOTE — OUTREACH NOTE
Prep Survey      Flowsheet Row Responses   [facility] patient discharged from? [place]   Is LACE score < 7 ? No   Eligibility Readm Mgmt   Discharge diagnosis **Diabetic ulcer of right great toe   Does the patient have one of the following disease processes/diagnoses(primary or secondary)? Other   Does the patient have Home health ordered? Yes   What is the Home health agency?  [facility]   Is there a DME ordered? No   Prep survey completed? Yes            JULIO COLLIER - Registered Nurse

## 2024-03-26 NOTE — PLAN OF CARE
Goal Outcome Evaluation:  Plan of Care Reviewed With: patient        Progress: improving  Outcome Evaluation: iv abx given. wound care completed. VSS. room air. imitrex given x 1 for a migraine

## 2024-03-26 NOTE — NURSING NOTE
Pt Discharged today, instruction for follow up give, a copy of meds list and AVS given, Meds to bed delivered, peripheral IV removed, wound dressing changed. Wheelchair transport requested.

## 2024-03-26 NOTE — PROGRESS NOTES
ID PROGRESS NOTE    CC: f/u R great toe infection and Serratia septicemia    Subj: No acute events.  No fever.  She says the foot is looking and feeling better.  She is hoping to be discharged today.      Medications:    Current Facility-Administered Medications:     acetaminophen (TYLENOL) tablet 1,000 mg, 1,000 mg, Oral, Once, [place] d[place] MD    acetaminophen (TYLENOL) tablet 650 mg, 650 mg, Oral, Q4H PRN, Victoria Roche MD, 650 mg at 03/23/24 0652    sennosides-docusate (PERICOLACE) 8.6-50 MG per tablet 2 tablet, 2 tablet, Oral, BID PRN **AND** polyethylene glycol (MIRALAX) packet 17 g, 17 g, Oral, Daily PRN **AND** bisacodyl (DULCOLAX) EC tablet 5 mg, 5 mg, Oral, Daily PRN **AND** bisacodyl (DULCOLAX) suppository 10 mg, 10 mg, Rectal, Daily PRN, Victoria Roche MD    cefepime 2000 mg IVPB in 100 mL NS (MBP), 2,000 mg, Intravenous, Q8H, Jerrell Gianes MD, 2,000 mg at 03/26/24 0247    dextrose (D50W) (25 g/50 mL) IV injection 25 g, 25 g, Intravenous, Q15 Min PRN, Victoria Roche MD    dextrose (GLUTOSE) oral gel 15 g, 15 g, Oral, Q15 Min PRN, Victoria Roche MD    DULoxetine (CYMBALTA) DR capsule 60 mg, 60 mg, Oral, Daily, Victoria Roche MD, 60 mg at 03/26/24 0822    gabapentin (NEURONTIN) capsule 300 mg, 300 mg, Oral, Nightly, Kale Villarreal MD, 300 mg at 03/25/24 2025    glucagon (GLUCAGEN) injection 1 mg, 1 mg, Intramuscular, Q15 Min PRN, Victoria Roche MD    HYDROcodone-acetaminophen (NORCO) 5-325 MG per tablet 1 tablet, 1 tablet, Oral, Q6H PRN, Kael Villarreal MD, 1 tablet at 03/26/24 0835    insulin glargine (LANTUS, SEMGLEE) injection 50 Units, 50 Units, Subcutaneous, Daily, Victoria Roche MD, 50 Units at 03/26/24 0829    insulin lispro (HUMALOG/ADMELOG) injection 2-7 Units, 2-7 Units, Subcutaneous, 4x Daily AC & at Bedtime, Victoria Roche MD, 2 Units at 03/26/24 0825    insulin lispro (HUMALOG/ADMELOG) injection 5 Units, 5 Units,  Subcutaneous, TID AC, Victoria Roche MD, 5 Units at 03/26/24 0828    lisinopril (PRINIVIL,ZESTRIL) tablet 20 mg, 20 mg, Oral, Daily, Victoria Roche MD, 20 mg at 03/26/24 0822    melatonin tablet 3 mg, 3 mg, Oral, Nightly PRN, Victoria Roche MD    metFORMIN (GLUCOPHAGE) tablet 1,000 mg, 1,000 mg, Oral, Daily With Breakfast, Kale Villarreal MD, 1,000 mg at 03/26/24 0822    metFORMIN (GLUCOPHAGE) tablet 500 mg, 500 mg, Oral, Daily With Dinner, Kale Villarreal MD, 500 mg at 03/25/24 1710    ondansetron ODT (ZOFRAN-ODT) disintegrating tablet 4 mg, 4 mg, Oral, Q6H PRN, 4 mg at 03/23/24 0755 **OR** ondansetron (ZOFRAN) injection 4 mg, 4 mg, Intravenous, Q6H PRN, Victoria Roche MD, 4 mg at 03/22/24 0023    pantoprazole (PROTONIX) EC tablet 40 mg, 40 mg, Oral, Q AM, Victoria Roche MD, 40 mg at 03/26/24 0633    rosuvastatin (CRESTOR) tablet 10 mg, 10 mg, Oral, Daily, Victoria Roche MD, 10 mg at 03/26/24 0822    SUMAtriptan (IMITREX) tablet 50 mg, 50 mg, Oral, Q2H PRN, Kale Villarreal MD, 50 mg at 03/25/24 2145      Objective:   Temp:  [98.1 °F (36.7 °C)-99.5 °F (37.5 °C)] 98.2 °F (36.8 °C)  Heart Rate:  [91-94] 92  Resp:  [18] 18  BP: (128-152)/(75-88) 143/88    GENERAL: Awake and alert, NAD, very nice  EYES: . No scleral icterus  CV: Normal rate  LUNGS:normal respiratory effort on ambient air  MSK: Right foot bandaged    Labs:  CBC, BMP, CRP, and blood cultures reviewed today  Lab Results   Component Value Date    WBC 7.36 03/26/2024    HGB 12.2 03/26/2024    HCT 36.7 03/26/2024    MCV 90.2 03/26/2024     03/26/2024     Lab Results   Component Value Date    GLUCOSE 152 (H) 03/26/2024    CALCIUM 9.0 03/26/2024     03/26/2024    K 3.8 03/26/2024    CO2 24.0 03/26/2024     03/26/2024    BUN 16 03/26/2024    CREATININE 0.91 03/26/2024    EGFR 72.8 03/26/2024    BCR 17.6 03/26/2024    ANIONGAP 11.0 03/26/2024     Lab Results   Component Value Date    CRP 3.48 (H)  03/26/2024     Lab Results   Component Value Date    HGBA1C 11.20 (H) 03/22/2024       Microbiology:  3/21 blood cultures 1/2 Serratia (susceptible to ceftriaxone, cefepime, levofloxacin, Bactrim)  3/24 Bcx: Negative to date    Prior radiology:  MRI right foot shows a first digit ulcer but no osteomyelitis or abscess    EKG ordered and personally reviewed and shows a QTc of 433 ms  ASSESSMENT/PLAN:  Sepsis due to right great toe and right foot infection with Serratia septicemia  Uncontrolled type 2 diabetes -last A1c 11.2% with hyperglycemia, neuropathy and diabetic foot ulcer    She is afebrile with normal white blood cell count.  Her repeat blood cultures are negative to date.  Her CRP is trended down.  The toe is improved.  MRI did not show any evidence of deep infection so no surgical debridement is needed at this time.    Discussed the case with Dr. Zapata yesterday.  My antibiotic recommendations are that we change her to ciprofloxacin 750 mg p.o. every 12 hours x 14 days.  She will continue to have outpatient follow-up with podiatry for local wound care and ongoing monitoring.    Thank you for allowing me to be involved in the care of this patient. Infectious diseases will sign off at this time with antibiotics plan in place, but please call me at [phone] if any further ID questions or new ID concerns.

## 2024-03-26 NOTE — PROGRESS NOTES
[facility] will follow post hospital as requested. Patient agreeable to services. Contact information confirmed. Patient's mobile phone is not currently working. Best for home health to call significant other at [phone] to schedule home health visits.Verbal order received from Dr. López Zapata for home health care.

## 2024-03-26 NOTE — DISCHARGE SUMMARY
Patient Name: Roxanna Beck  : 1965  MRN: [id number]    Date of Admission: 3/21/2024  Date of Discharge:  3/26/2024  Primary Care Physician: Donato Vilchis DO      Chief Complaint:   Altered Mental Status      Discharge Diagnoses     Active Hospital Problems    Diagnosis  POA    **Diabetic ulcer of right great toe [E11.621, L97.519]  Yes    Gram-negative bacteremia [R78.81]  Yes    Sepsis due to Gram negative bacteria [A41.50]  Yes    Mixed hyperlipidemia [E78.2]  Yes    GERD (gastroesophageal reflux disease) [K21.9]  Yes    Nausea & vomiting [R11.2]  Yes    Fever [R50.9]  Yes    Hyponatremia [E87.1]  Yes    Acute metabolic encephalopathy [G93.41]  Yes    Insulin dependent type 2 diabetes mellitus [E11.9, Z79.4]  Not Applicable    Hypertension [I10]  Yes    Diabetic peripheral neuropathy [E11.42]  Yes    Generalized anxiety disorder [F41.1]  Yes      Resolved Hospital Problems   No resolved problems to display.        Hospital Course     This is a 58-year-old woman with type 2 diabetes, admitted by peripheral neuropathy, hypertension, hyperlipidemia and diabetic foot ulcer of the right first toe who came to the hospital with confusion, fever to 103.2.  She underwent MRI which is negative for osteomyelitis.  Podiatry was consulted, and she may undergo possible outpatient debridement of the ulcer however no acute intervention was recommended inpatient.  Infectious disease also saw the patient in consultation.  Initial set of blood cultures grew Serratia.  She was started on cefepime initially and will be discharged on ciprofloxacin 750 mg every 12 hours for 14 days. She will be following up with Podiatry as an outpatient for local wound care.     Physical Exam:  Temp:  [97.5 °F (36.4 °C)-99.5 °F (37.5 °C)] 97.5 °F (36.4 °C)  Heart Rate:  [91-98] 98  Resp:  [18] 18  BP: (133-152)/(75-88) 133/85  Body mass index is 31.45 kg/m².  Physical Exam  Constitutional:       General: She is not in acute  distress.  HENT:      Head: Normocephalic and atraumatic.   Cardiovascular:      Rate and Rhythm: Normal rate and regular rhythm.   Pulmonary:      Effort: Pulmonary effort is normal. No respiratory distress.   Abdominal:      General: There is no distension.      Palpations: Abdomen is soft.      Tenderness: There is no abdominal tenderness.   Neurological:      Mental Status: She is alert and oriented to person, place, and time.         Consultants     Consult Orders (all) (From admission, onward)       Start     Ordered    03/21/24 2030  Inpatient Podiatry Consult  Once        Specialty:  Podiatry  Provider:  López Zapata DPM    03/21/24 2029 03/21/24 2030  Inpatient Infectious Diseases Consult  Once        Specialty:  Infectious Diseases  Provider:  Haley Sams MD    03/21/24 2029 03/21/24 1906  Inpatient Diabetes Educator Consult  Once        Provider:  (Not yet assigned)    03/21/24 1905 03/21/24 1629  [facility] (on-call MD unless specified) Details  Once        Specialty:  Hospitalist  Provider:  (Not yet assigned)    03/21/24 1628                  Procedures     Imaging Results (All)       Procedure Component Value Units Date/Time    MRI Foot Right With & Without Contrast [[id number]] Collected: 03/23/24 1928     Updated: 03/23/24 1928    Narrative:        Patient: MARIELA CARR  Time Out: 19:27  Exam(s): MRI RIGHT FOOT W WO Contrast     EXAM:    MR Right Lower Extremity Without and With Intravenous Contrast, Foot    CLINICAL HISTORY:     Reason for exam: Foot swelling, diabetic, osteomyelitis suspected,   xray done.    TECHNIQUE:    Multiplanar magnetic resonance images of the right foot without and   with intravenous contrast.    COMPARISON:    No relevant prior studies available.    FINDINGS:  Soft tissue ulcer along the plantar first digit.  Normal marrow signal.    No osteomyelitis.  No septic arthritis.  No tenosynovitis.  No abscess.    No acute fracture.    IMPRESSION:         First digit  "ulcer.  No osteomyelitis.      Impression:          Electronically signed by Juan Jose Jacobson MD on 03-23-24 at 1927    XR Chest 1 View [[id number]] Collected: 03/21/24 1553     Updated: 03/21/24 1557    Narrative:      XR CHEST 1 VW-     Clinical: Fever     COMPARISON 12/24/2021     FINDINGS: Cardiac size within normal limits. No mediastinal or hilar  abnormality. Lungs are clear. No effusion or edema seen.     CONCLUSION: No active disease of the chest     This report was finalized on 3/21/2024 3:54 PM by Dr. Reji Keen M.D  on Workstation: [id number]               Pertinent Labs     Results from last 7 days   Lab Units 03/26/24  0405 03/25/24  0329 03/24/24  0402 03/23/24  0404   WBC 10*3/mm3 7.36 5.44 3.71 6.02   HEMOGLOBIN g/dL 12.2 12.1 11.6* 11.9*   PLATELETS 10*3/mm3 255 210 171 146     Results from last 7 days   Lab Units 03/26/24  0405 03/25/24  0329 03/24/24  0402 03/23/24  0404   SODIUM mmol/L 137 138 136 132*   POTASSIUM mmol/L 3.8 3.9 3.6 3.6   CHLORIDE mmol/L 102 102 103 99   CO2 mmol/L 24.0 26.0 24.0 20.2*   BUN mg/dL 16 16 21* 16   CREATININE mg/dL 0.91 0.73 0.78 0.73   GLUCOSE mg/dL 152* 170* 221* 138*   Estimated Creatinine Clearance: 69.5 mL/min (by C-G formula based on SCr of 0.91 mg/dL).  Results from last 7 days   Lab Units 03/25/24  0329 03/24/24  0402 03/21/24  1527   ALBUMIN g/dL 3.5 3.2* 3.5   BILIRUBIN mg/dL 0.4 0.3 0.5   ALK PHOS U/L 99 108 113   AST (SGOT) U/L 14 17 27   ALT (SGPT) U/L 23 26 35*     Results from last 7 days   Lab Units 03/26/24  0405 03/25/24  0329 03/24/24  0402 03/23/24  0404 03/22/24  0348 03/21/24  1527   CALCIUM mg/dL 9.0 9.0 8.2* 8.3*   < > 8.6   ALBUMIN g/dL  --  3.5 3.2*  --   --  3.5   MAGNESIUM mg/dL  --  1.7 1.8 1.7  --   --     < > = values in this interval not displayed.               Invalid input(s): \"LDLCALC\"  Results from last 7 days   Lab Units 03/24/24  0402 03/21/24  1537 03/21/24  1508   BLOODCX  No growth at 2 days  No growth at 2 days No growth " at 4 days Serratia marcescens*   BCIDPCR   --   --  Serratia marcescens. Identification by BCID2 PCR.*       Test Results Pending at Discharge     Pending Labs       Order Current Status    Blood Culture - Blood, Hand, Left Preliminary result    Blood Culture - Blood, Hand, Right Preliminary result    Blood Culture - Blood, Hand, Right Preliminary result            Discharge Details        Discharge Medications        New Medications        Instructions Start Date   ciprofloxacin 750 MG tablet  Commonly known as: CIPRO   Take 1 tablet by mouth Every 12 (Twelve) Hours for 28 doses.      HYDROcodone-acetaminophen 5-325 MG per tablet  Commonly known as: NORCO   1 tablet, Oral, Every 6 Hours PRN             Changes to Medications        Instructions Start Date   amitriptyline 50 MG tablet  Commonly known as: ELAVIL  What changed:   medication strength  how much to take   50 mg, Oral, Nightly      HumaLOG KwikPen 100 UNIT/ML solution pen-injector  Generic drug: Insulin Lispro (1 Unit Dial)  What changed: when to take this   Inject 5 Units under the skin into the appropriate area as directed 3 (Three) Times a Day With Meals             Continue These Medications        Instructions Start Date   baclofen 10 MG tablet  Commonly known as: LIORESAL   10 mg, Oral, 2 Times Daily      cetirizine 10 MG tablet  Commonly known as: zyrTEC   10 mg, Oral, Daily      DULoxetine 60 MG capsule  Commonly known as: CYMBALTA   60 mg, Oral, Daily      gabapentin 300 MG capsule  Commonly known as: NEURONTIN   300 mg, Oral, Nightly PRN      glipizide 10 MG tablet  Commonly known as: GLUCOTROL   10 mg, Oral, 2 Times Daily Before Meals      Lantus SoloStar 100 UNIT/ML injection pen  Generic drug: Insulin Glargine   50 Units, Subcutaneous, Daily      lisinopril 20 MG tablet  Commonly known as: PRINIVIL,ZESTRIL   20 mg, Oral, Daily      metFORMIN 1000 MG tablet  Commonly known as: GLUCOPHAGE   1,000 mg, Oral, 2 Times Daily      omeprazole 40 MG  "capsule  Commonly known as: priLOSEC   40 mg, Oral, Daily      PARoxetine 20 MG tablet  Commonly known as: PAXIL   20 mg, Oral, Every Morning      Pen Needles 32G X 4 MM misc   1 each, Subcutaneous, 4 Times Daily PRN, Formulary Compliance Approval      pioglitazone 30 MG tablet  Commonly known as: ACTOS   30 mg, Oral, Daily      rosuvastatin 10 MG tablet  Commonly known as: CRESTOR   10 mg, Oral, Daily      SUMAtriptan 50 MG tablet  Commonly known as: IMITREX   50 mg, Oral, Every 2 Hours PRN, Take one tablet at onset of headache. May repeat dose one time in 2 hours if headache not relieved.             Stop These Medications      cephalexin 500 MG capsule  Commonly known as: KEFLEX     hydroCHLOROthiazide 25 MG tablet     meloxicam 15 MG tablet  Commonly known as: MOBIC     Soliqua 100-33 UNT-MCG/ML solution pen-injector injection  Generic drug: Insulin Glargine-Lixisenatide              Allergies   Allergen Reactions    Codeine Nausea And Vomiting    Percocet [Oxycodone-Acetaminophen] Nausea And Vomiting     Caused bad pain    Adhesive Tape Rash    Toradol [Ketorolac Tromethamine] Other (See Comments)     \"doesnt do anything\"   - DOESN'T WORK PER PATIENT         Discharge Disposition:  Home-Health Care Sv    Discharge Diet:  Diet Order   Procedures    Diet: Cardiac; Healthy Heart (2-3 Na+); Fluid Consistency: Thin (IDDSI 0)       Discharge Activity:       CODE STATUS:    Code Status and Medical Interventions:   Ordered at: 03/21/24 0094     Code Status (Patient has no pulse and is not breathing):    CPR (Attempt to Resuscitate)     Medical Interventions (Patient has pulse or is breathing):    Full       No future appointments.   Contact information for follow-up providers       López Zapata DPM. Schedule an appointment as soon as possible for a visit in 1 week(s).    Specialty: Podiatry  Why: Podiatry  Contact information:  [address] [place] [id number] [phone]               Deng" DO Vida Oates    Specialty: Family Medicine  Contact information:  [address] [facility]  [address]  [place] [id number] [phone]                       Contact information for after-discharge care       Home Medical Care       [facility] .    Service: Home Health Services  Contact information:  [address]  [facility] [address] [phone]                                   Time Spent on Discharge:  Greater than 30 minutes      Smooth Osei MD  [facility]  03/26/24  15:19 EDT

## 2024-03-27 ENCOUNTER — HOME CARE VISIT (OUTPATIENT)
Dept: HOME HEALTH SERVICES | Facility: HOME HEALTHCARE | Age: 59
End: 2024-03-27

## 2024-03-27 NOTE — PAYOR COMM NOTE
"Mariela Beck (58 y.o. Female)      PATIENT DISCHARGED 03/26/2024:  REF# [id number]      DEPT: [phone]     [facility]: NPI [id number] [facility]# [id number]     HUYEN LAWS RN,[facility]     Date of Birth   1965    Social Security Number       Address   [address] [place] [id number]    Home Phone       MRN   [id number]       [facility]    Marital Status   Single                            Admission Date   3/21/24    Admission Type   Emergency    Admitting Provider   Victoria Roche MD    Attending Provider       Department, Room/Bed   [facility] 4 [facility], S413/1       Discharge Date   3/26/2024    Discharge Disposition   Home-Health Care [facility]    Discharge Destination                                 Attending Provider: (none)   Allergies: Codeine, Percocet [Oxycodone-acetaminophen], Adhesive Tape, Toradol [Ketorolac Tromethamine]    Isolation: None   Infection: None   Code Status: Prior    Ht: 162.6 cm (64\")   Wt: 83.1 kg (183 lb 3.2 oz)    Admission Cmt: None   Principal Problem: Diabetic ulcer of right great toe [E11.621,L97.519]                   Active Insurance as of 3/21/2024       Primary Coverage       Payor Plan Insurance Group Employer/Plan Group    ANTHEM MEDICAID ANTH MEDICAID [id number]       Payor Plan Address Payor Plan Phone Number Payor Plan Fax Number Effective Dates    [address] [address] [phone]  1/1/2024 - None Entered    [facility] [id number]         Subscriber Name Subscriber Birth Date Member ID       MARIELA BECK 1965 [id number]                     Emergency Contacts        (Rel.) Home Phone Work Phone Mobile Phone    Sarah Zayas (Sister) -- -- [phone]    PIERO ARROYO (Daughter) [phone] -- [phone]    Barb Beck (Brother) [phone] -- --    BERTHA TRIPP (Significant Other) [phone] -- [phone]                 Discharge Summary        Osei, " MD Smooth at 24 1510              Patient Name: Roxanna Beck  : 1965  MRN: [id number]    Date of Admission: 3/21/2024  Date of Discharge:  3/26/2024  Primary Care Physician: Donato Vilchis DO      Chief Complaint:   Altered Mental Status      Discharge Diagnoses     Active Hospital Problems    Diagnosis  POA    **Diabetic ulcer of right great toe [E11.621, L97.519]  Yes    Gram-negative bacteremia [R78.81]  Yes    Sepsis due to Gram negative bacteria [A41.50]  Yes    Mixed hyperlipidemia [E78.2]  Yes    GERD (gastroesophageal reflux disease) [K21.9]  Yes    Nausea & vomiting [R11.2]  Yes    Fever [R50.9]  Yes    Hyponatremia [E87.1]  Yes    Acute metabolic encephalopathy [G93.41]  Yes    Insulin dependent type 2 diabetes mellitus [E11.9, Z79.4]  Not Applicable    Hypertension [I10]  Yes    Diabetic peripheral neuropathy [E11.42]  Yes    Generalized anxiety disorder [F41.1]  Yes      Resolved Hospital Problems   No resolved problems to display.        Hospital Course     This is a 58-year-old woman with type 2 diabetes, admitted by peripheral neuropathy, hypertension, hyperlipidemia and diabetic foot ulcer of the right first toe who came to the hospital with confusion, fever to 103.2.  She underwent MRI which is negative for osteomyelitis.  Podiatry was consulted, and she may undergo possible outpatient debridement of the ulcer however no acute intervention was recommended inpatient.  Infectious disease also saw the patient in consultation.  Initial set of blood cultures grew Serratia.  She was started on cefepime initially and will be discharged on ciprofloxacin 750 mg every 12 hours for 14 days. She will be following up with Podiatry as an outpatient for local wound care.     Physical Exam:  Temp:  [97.5 °F (36.4 °C)-99.5 °F (37.5 °C)] 97.5 °F (36.4 °C)  Heart Rate:  [91-98] 98  Resp:  [18] 18  BP: (133-152)/(75-88) 133/85  Body mass index is 31.45 kg/m².  Physical Exam  Constitutional:        General: She is not in acute distress.  HENT:      Head: Normocephalic and atraumatic.   Cardiovascular:      Rate and Rhythm: Normal rate and regular rhythm.   Pulmonary:      Effort: Pulmonary effort is normal. No respiratory distress.   Abdominal:      General: There is no distension.      Palpations: Abdomen is soft.      Tenderness: There is no abdominal tenderness.   Neurological:      Mental Status: She is alert and oriented to person, place, and time.         Consultants     Consult Orders (all) (From admission, onward)       Start     Ordered    03/21/24 2030  Inpatient Podiatry Consult  Once        Specialty:  Podiatry  Provider:  López Zapata DPM    03/21/24 2029 03/21/24 2030  Inpatient Infectious Diseases Consult  Once        Specialty:  Infectious Diseases  Provider:  Haley Sams MD    03/21/24 2029 03/21/24 1906  Inpatient Diabetes Educator Consult  Once        Provider:  (Not yet assigned)    03/21/24 1905 03/21/24 1629  [facility] (on-call MD unless specified) Details  Once        Specialty:  Hospitalist  Provider:  (Not yet assigned)    03/21/24 1628                  Procedures     Imaging Results (All)       Procedure Component Value Units Date/Time    MRI Foot Right With & Without Contrast [[id number]] Collected: 03/23/24 1928     Updated: 03/23/24 1928    Narrative:        Patient: MARIELA CARR  Time Out: 19:27  Exam(s): MRI RIGHT FOOT W WO Contrast     EXAM:    MR Right Lower Extremity Without and With Intravenous Contrast, Foot    CLINICAL HISTORY:     Reason for exam: Foot swelling, diabetic, osteomyelitis suspected,   xray done.    TECHNIQUE:    Multiplanar magnetic resonance images of the right foot without and   with intravenous contrast.    COMPARISON:    No relevant prior studies available.    FINDINGS:  Soft tissue ulcer along the plantar first digit.  Normal marrow signal.    No osteomyelitis.  No septic arthritis.  No tenosynovitis.  No abscess.    No acute  "fracture.    IMPRESSION:         First digit ulcer.  No osteomyelitis.      Impression:          Electronically signed by Juan Jose Jacobson MD on 03-23-24 at 1927    XR Chest 1 View [[id number]] Collected: 03/21/24 1553     Updated: 03/21/24 1557    Narrative:      XR CHEST 1 VW-     Clinical: Fever     COMPARISON 12/24/2021     FINDINGS: Cardiac size within normal limits. No mediastinal or hilar  abnormality. Lungs are clear. No effusion or edema seen.     CONCLUSION: No active disease of the chest     This report was finalized on 3/21/2024 3:54 PM by Dr. Reji Keen M.D  on Workstation: [id number]               Pertinent Labs     Results from last 7 days   Lab Units 03/26/24  0405 03/25/24 0329 03/24/24  0402 03/23/24  0404   WBC 10*3/mm3 7.36 5.44 3.71 6.02   HEMOGLOBIN g/dL 12.2 12.1 11.6* 11.9*   PLATELETS 10*3/mm3 255 210 171 146     Results from last 7 days   Lab Units 03/26/24  0405 03/25/24  0329 03/24/24  0402 03/23/24  0404   SODIUM mmol/L 137 138 136 132*   POTASSIUM mmol/L 3.8 3.9 3.6 3.6   CHLORIDE mmol/L 102 102 103 99   CO2 mmol/L 24.0 26.0 24.0 20.2*   BUN mg/dL 16 16 21* 16   CREATININE mg/dL 0.91 0.73 0.78 0.73   GLUCOSE mg/dL 152* 170* 221* 138*   Estimated Creatinine Clearance: 69.5 mL/min (by C-G formula based on SCr of 0.91 mg/dL).  Results from last 7 days   Lab Units 03/25/24  0329 03/24/24  0402 03/21/24  1527   ALBUMIN g/dL 3.5 3.2* 3.5   BILIRUBIN mg/dL 0.4 0.3 0.5   ALK PHOS U/L 99 108 113   AST (SGOT) U/L 14 17 27   ALT (SGPT) U/L 23 26 35*     Results from last 7 days   Lab Units 03/26/24  0405 03/25/24  0329 03/24/24  0402 03/23/24  0404 03/22/24  0348 03/21/24  1527   CALCIUM mg/dL 9.0 9.0 8.2* 8.3*   < > 8.6   ALBUMIN g/dL  --  3.5 3.2*  --   --  3.5   MAGNESIUM mg/dL  --  1.7 1.8 1.7  --   --     < > = values in this interval not displayed.               Invalid input(s): \"LDLCALC\"  Results from last 7 days   Lab Units 03/24/24  0402 03/21/24  1537 03/21/24  1508   BLOODCX  No " growth at 2 days  No growth at 2 days No growth at 4 days Serratia marcescens*   BCIDPCR   --   --  Serratia marcescens. Identification by BCID2 PCR.*       Test Results Pending at Discharge     Pending Labs       Order Current Status    Blood Culture - Blood, Hand, Left Preliminary result    Blood Culture - Blood, Hand, Right Preliminary result    Blood Culture - Blood, Hand, Right Preliminary result            Discharge Details        Discharge Medications        New Medications        Instructions Start Date   ciprofloxacin 750 MG tablet  Commonly known as: CIPRO   Take 1 tablet by mouth Every 12 (Twelve) Hours for 28 doses.      HYDROcodone-acetaminophen 5-325 MG per tablet  Commonly known as: NORCO   1 tablet, Oral, Every 6 Hours PRN             Changes to Medications        Instructions Start Date   amitriptyline 50 MG tablet  Commonly known as: ELAVIL  What changed:   medication strength  how much to take   50 mg, Oral, Nightly      HumaLOG KwikPen 100 UNIT/ML solution pen-injector  Generic drug: Insulin Lispro (1 Unit Dial)  What changed: when to take this   Inject 5 Units under the skin into the appropriate area as directed 3 (Three) Times a Day With Meals             Continue These Medications        Instructions Start Date   baclofen 10 MG tablet  Commonly known as: LIORESAL   10 mg, Oral, 2 Times Daily      cetirizine 10 MG tablet  Commonly known as: zyrTEC   10 mg, Oral, Daily      DULoxetine 60 MG capsule  Commonly known as: CYMBALTA   60 mg, Oral, Daily      gabapentin 300 MG capsule  Commonly known as: NEURONTIN   300 mg, Oral, Nightly PRN      glipizide 10 MG tablet  Commonly known as: GLUCOTROL   10 mg, Oral, 2 Times Daily Before Meals      Lantus SoloStar 100 UNIT/ML injection pen  Generic drug: Insulin Glargine   50 Units, Subcutaneous, Daily      lisinopril 20 MG tablet  Commonly known as: PRINIVIL,ZESTRIL   20 mg, Oral, Daily      metFORMIN 1000 MG tablet  Commonly known as: GLUCOPHAGE   1,000  "mg, Oral, 2 Times Daily      omeprazole 40 MG capsule  Commonly known as: priLOSEC   40 mg, Oral, Daily      PARoxetine 20 MG tablet  Commonly known as: PAXIL   20 mg, Oral, Every Morning      Pen Needles 32G X 4 MM misc   1 each, Subcutaneous, 4 Times Daily PRN, Formulary Compliance Approval      pioglitazone 30 MG tablet  Commonly known as: ACTOS   30 mg, Oral, Daily      rosuvastatin 10 MG tablet  Commonly known as: CRESTOR   10 mg, Oral, Daily      SUMAtriptan 50 MG tablet  Commonly known as: IMITREX   50 mg, Oral, Every 2 Hours PRN, Take one tablet at onset of headache. May repeat dose one time in 2 hours if headache not relieved.             Stop These Medications      cephalexin 500 MG capsule  Commonly known as: KEFLEX     hydroCHLOROthiazide 25 MG tablet     meloxicam 15 MG tablet  Commonly known as: MOBIC     Soliqua 100-33 UNT-MCG/ML solution pen-injector injection  Generic drug: Insulin Glargine-Lixisenatide              Allergies   Allergen Reactions    Codeine Nausea And Vomiting    Percocet [Oxycodone-Acetaminophen] Nausea And Vomiting     Caused bad pain    Adhesive Tape Rash    Toradol [Ketorolac Tromethamine] Other (See Comments)     \"doesnt do anything\"   - DOESN'T WORK PER PATIENT         Discharge Disposition:  Home-Health Care Svc    Discharge Diet:  Diet Order   Procedures    Diet: Cardiac; Healthy Heart (2-3 Na+); Fluid Consistency: Thin (IDDSI 0)       Discharge Activity:       CODE STATUS:    Code Status and Medical Interventions:   Ordered at: 03/21/24 4909     Code Status (Patient has no pulse and is not breathing):    CPR (Attempt to Resuscitate)     Medical Interventions (Patient has pulse or is breathing):    Full       No future appointments.   Contact information for follow-up providers       López Zapata DPM. Schedule an appointment as soon as possible for a visit in 1 week(s).    Specialty: Podiatry  Why: Podiatry  Contact information:  [address]  [address] " [place] [address]  [phone]               Donato Vilchis DO .    Specialty: Family Medicine  Contact information:  [address] [facility]  [address]  [facility] [id number] [phone]                       Contact information for after-discharge care       Home Medical Care       [facility] .    Service: Home Health Services  Contact information:  [address]  [facility] [address] [phone]                                   Time Spent on Discharge:  Greater than 30 minutes      Smooth Islas MD  [facility]  03/26/24  15:19 EDT                Electronically signed by Smooth Islas MD at 03/26/24 1522       Discharge Order (From admission, onward)       Start     Ordered    03/26/24 1153  Discharge patient  Once        Expected Discharge Date: 03/26/24   [place] Dis[place] [facility] [place] [facility] [place]   Physician of Record for Attribution - Please select from Treatment Team: SMOOTH ISLAS [[id number]]   Review needed by CMO to determine Physician of Record: No      Question Answer Comment   Physician of Record for Attribution - Please select from Treatment Team SMOOTH ISLAS    Review needed by CMO to determine Physician of Record No        03/26/24 1157                Pushpa Mendez, RN     Case Management     Case Management/Social Work     Signed     Date of Service: 03/26/24 1251  Creation Time: 03/26/24 1251     Signed         Case Management Discharge Note        Final Note: Discharge to home with family support and [facility]. Pushpa KELLER RN CCP        Selected Continued Care - Admitted Since 3/21/2024         Destination    No services have been selected for the patient.                    Durable Medical Equipment    No services have been selected for the patient.                    Dialysis/Infusion    No services have been selected for the patient.                    Home Medical Care         Service Provider Selected  Services Address Phone Fax Patient Preferred     [facility] [address] [address] [phone] --                  Therapy    No services have been selected for the patient.                    Community Resources    No services have been selected for the patient.                    Community & DME    No services have been selected for the patient.                         Transportation Services  Private: Car     Final Discharge Disposition Code: 06 - home with home health care

## 2024-03-28 ENCOUNTER — HOME CARE VISIT (OUTPATIENT)
Dept: HOME HEALTH SERVICES | Facility: HOME HEALTHCARE | Age: 59
End: 2024-03-28
Payer: MEDICAID

## 2024-03-28 VITALS
RESPIRATION RATE: 18 BRPM | SYSTOLIC BLOOD PRESSURE: 116 MMHG | DIASTOLIC BLOOD PRESSURE: 70 MMHG | HEIGHT: 64 IN | TEMPERATURE: 97.2 F | HEART RATE: 91 BPM | WEIGHT: 179 LBS | OXYGEN SATURATION: 97 % | BODY MASS INDEX: 30.56 KG/M2

## 2024-03-28 PROCEDURE — G0299 HHS/HOSPICE OF RN EA 15 MIN: HCPCS

## 2024-03-28 NOTE — HOME HEALTH
"SOC Note:  58 year old female history of diabetes that was recently admitted for a diabetic foot ulcer, she has had two inpatient admissions for ulcer management--being followed by Dr. Guidry with podiatry. She had an appointment today in office and reports some debridement done. She does her own dressings daily--SN has reached out to Jodi to see if he wants dressings done by nursing.   Patient will be kept for infection prevention education and diabetes education.   PT  ST consult because of memory deficit noted and inattention  Home Health ordered for: disciplines CPR    Reason for Hosp/Primary Dx/Co-morbidities: diabetic foot ulcer    Focus of Care: SN needed for diabetes education and diabetic foot ulcer education with infection prevention education      Patient's goal(s): \"get my strength back\"  Current Functional status/mobility/DME: walker    HB status/Living Arrangements: lives with significant other and grandson    Skin Integrity/wound status: diabetic foot ulcer to right foot--isabella d/t dressing in place    Code Status: full    Fall Risk/Safety concerns: High falls risk    Educated on Emergency Plan, steps to take prior to going to the ER and when to Call [facility].     Medication issues/Concerns:ASA, insulin, norco, gabapentin    Additional Problems/Concerns: memory cognition--patient has been having issue with her memory    SDOH Barriers (i.e. caregiver concerns, social isolation, transportation, food insecurity, environment, income etc.)/Need for MSW.    Plan for next visit: diabetes education-diet, foot care, cardiopulmonary assessments"

## 2024-03-28 NOTE — Clinical Note
"SOC Note:  58 year old female history of diabetes that was recently admitted for a diabetic foot ulcer, she has had two inpatient admissions for ulcer management--being followed by Dr. Guidry with podiatry. She had an appointment today in office and reports some debridement done. She does her own dressings daily--SN has reached out to Jodi to see if he wants dressings done by nursing.   Patient will be kept for infection prevention education and diabetes education.   PT  ST consult because of memory deficit noted and inattention  Home Health ordered for: disciplines CPR    Reason for Hosp/Primary Dx/Co-morbidities: diabetic foot ulcer    Focus of Care: SN needed for diabetes education and diabetic foot ulcer education with infection prevention education      Patient's goal(s): \"get my strength back\"  Current Functional status/mobility/DME: walker    HB status/Living Arrangements: lives with significant other and grandson    Skin Integrity/wound status: diabetic foot ulcer to right foot--isabella d/t dressing in place    Code Status: full    Fall Risk/Safety concerns: High falls risk    Educated on Emergency Plan, steps to take prior to going to the ER and when to Call [facility].     Medication issues/Concerns:ASA, insulin, norco, gabapentin    Additional Problems/Concerns: memory cognition--patient has been having issue with her memory    SDOH Barriers (i.e. caregiver concerns, social isolation, transportation, food insecurity, environment, income etc.)/Need for MSW.    Plan for next visit: diabetes education-diet, foot care, cardiopulmonary assessments   "

## 2024-03-29 LAB
BACTERIA SPEC AEROBE CULT: NORMAL
BACTERIA SPEC AEROBE CULT: NORMAL

## 2024-04-02 ENCOUNTER — HOME CARE VISIT (OUTPATIENT)
Dept: HOME HEALTH SERVICES | Facility: HOME HEALTHCARE | Age: 59
End: 2024-04-02
Payer: MEDICAID

## 2024-04-02 VITALS
TEMPERATURE: 96.4 F | SYSTOLIC BLOOD PRESSURE: 122 MMHG | HEART RATE: 98 BPM | DIASTOLIC BLOOD PRESSURE: 74 MMHG | OXYGEN SATURATION: 99 %

## 2024-04-02 VITALS
HEART RATE: 94 BPM | OXYGEN SATURATION: 98 % | SYSTOLIC BLOOD PRESSURE: 122 MMHG | TEMPERATURE: 97 F | DIASTOLIC BLOOD PRESSURE: 84 MMHG | RESPIRATION RATE: 18 BRPM

## 2024-04-02 PROCEDURE — G0153 HHCP-SVS OF S/L PATH,EA 15MN: HCPCS

## 2024-04-02 PROCEDURE — G0299 HHS/HOSPICE OF RN EA 15 MIN: HCPCS

## 2024-04-02 NOTE — Clinical Note
MEDICAL NECESSITY: Speech Therapy is not indicated at this time. All needs were met this visit.   EVALUATION:  [facility] SLUMS             EDUCATION  Cognitive strategies  RESULTS: Patient scored 26/30 on the SLUMS. Patient is functional in her home, managing her own medications, checking blood sugar levels, schedule and finances. Expressive language skills are intact. Mild short term memory deficits are augmented by using external memory aids. Patient was instructed in strategies to increase cognitive skills (ie. playing games, puzzles, reading, etc).  ALL NEEDS MET THIS VISIT. EVALUATION ONLY.

## 2024-04-03 ENCOUNTER — READMISSION MANAGEMENT (OUTPATIENT)
Dept: CALL CENTER | Facility: HOSPITAL | Age: 59
End: 2024-04-03
Payer: MEDICAID

## 2024-04-03 ENCOUNTER — HOME CARE VISIT (OUTPATIENT)
Dept: HOME HEALTH SERVICES | Facility: HOME HEALTHCARE | Age: 59
End: 2024-04-03
Payer: MEDICAID

## 2024-04-03 PROCEDURE — G0151 HHCP-SERV OF PT,EA 15 MIN: HCPCS

## 2024-04-03 NOTE — OUTREACH NOTE
Medical Week 2 Survey      Flowsheet Row Responses   [facility] patient discharged from? [place]   Does the patient have one of the following disease processes/diagnoses(primary or secondary)? Other   Week 2 attempt successful? Yes   Call start time 0813   Discharge diagnosis **Diabetic ulcer of right great toe   Call end time 0815   Is patient permission given to speak with other caregiver? Yes   List who call center can speak with BERTHA TRIPP Significant Other   Person spoke with today (if not patient) and relationship BERTHA TRIPP Significant Other   Meds reviewed with patient/caregiver? Yes  [Reports patient is taking her antibiotic]   Does the patient have all medications ordered at discharge? Yes   Is the patient taking all medications as directed (includes completed medication regime)? Yes   Does the patient have a primary care provider?  Yes   Comments regarding PCP Follow up with Donato Vilchis PCP   Has the patient kept scheduled appointments due by today? Yes  [Patient has had follow up with podiatrist, DR Zapata]   What is the Home health agency?  [facility]   Has home health visited the patient within 72 hours of discharge? Yes   Psychosocial issues? No   Comments Reports that patient is checking her blood sugars at home.   Did the patient receive a copy of their discharge instructions? Yes   Nursing interventions Reviewed instructions with patient  [sig other]   What is the patient's perception of their health status since discharge? Improving   Is the patient/caregiver able to teach back signs and symptoms related to disease process for when to call PCP? Yes   Is the patient/caregiver able to teach back signs and symptoms related to disease process for when to call 911? Yes   Is the patient/caregiver able to teach back the hierarchy of who to call/visit for symptoms/problems? PCP, Specialist, Home health nurse, Urgent Care, ED, 911 Yes   If the patient is a current smoker, are they  able to teach back resources for cessation? Not a smoker   Week 2 Call Completed? Yes   Is the patient interested in additional calls from an ambulatory ? No   Would this patient benefit from a Referral to [facility] Social Work? No   Wrap up additional comments Denies any questions or needs today   Call end time 0800            MARIELA CASTRO - Registered Nurse

## 2024-04-03 NOTE — HOME HEALTH
REASON FOR REFERRAL: Speech Therapy referral for cognition due to difficulty with attention and memory.   PRIMARY DIAGNOSIS: Cognitive Impairment  SECONDARY DIAGNOSIS: Type 2 diabetes mellitus with foot ulcer   VFSS OR FEES: None known  PERTINENT HISTORY: Pt is a 58 year old female history of diabetes that was recently admitted to hospital for a diabetic foot ulcer with diagnosis of  and metabolic encephalopathy. She has had two inpatient admissions for ulcer management and is being followed by Dr. Guidry with podiatry. Hyperglycemia due to diabetes mellitus, Acute metabolic encephalopathy, Mixed hyperlipidemia, GERD, Nausea & vomiting,  Fever, Hyponatremia,  Gram-negative bacteremia, Sepsis due to Gram negative bacteria,   PRIOR LEVEL OF FUNCTION: Pt lives with significant other and grandson.      MEDICAL NECESSITY: Speech Therapy is not indicated at this time. All needs were met this visit.   EVALUATION:  [facility]             EDUCATION  Cognitive strategies  RESULTS: Patient scored 26/30 on the SLUMS. Patient is functional in her home, managing her own medications, checking blood sugar levels, schedule and finances. Expressive language skills are intact. Mild short term memory deficits are augmented by using external memory aids. Patient was instructed in strategies to increase cognitive skills (ie. playing games, puzzles, reading, etc).  ALL NEEDS MET THIS VISIT. EVALUATION ONLY.

## 2024-04-03 NOTE — Clinical Note
Home PT evaluation on 4/3/24.  May be evaluation only if she will remain restricted with her right LE weightbearing activities.  Will call patient and decide if further treatment is needed.

## 2024-04-03 NOTE — PROGRESS NOTES
"Enter Query Response Below      Query Response:Metabolic encephalopathy was not supported              If applicable, please update the problem list.       Patient: Roxanna Beck        : 1965  Account: [id number]           Admit Date: 3/21/2024        How to Respond to this query:       a. Click New Note     b. Answer query within the yellow box.                c. Update the Problem List, if applicable.      If you have any questions about this query contact me at: [email or web address]     ,     Risk Factors: 58-year-old female with history of diabetes with \"right hallux infection\" per podiatry note dated 3/22.   Clinical Indicators: \"Presented to the emergency room with confusion and a fever\" per H&P. ED note reads, \" is now present at bedside and states that patient is much more coherent.\" Discharge summary lists, \" Acute metabolic encephalopathy\" under chief complaint. GCS 15 on admission and throughout encounter. No CTH done.  Treatment: Cefazolin (3/21), Cefepime (3/23-), Vancomycin (3/22,23), Ceftriaxone (3/23,), ciprofloxacin (3/26). Discharged on ciprofloxacin 750 mg every 12 hours for 14 days.    After study, was metabolic encephalopathy clinically supported during this admission?    Metabolic encephalopathy was supported with additional clinical indicators:____________  Metabolic encephalopathy was not supported  Other- specify_____________  Unable to determine      By submitting this query, we are merely seeking further clarification of documentation to accurately reflect all conditions that you are monitoring, evaluating, treating or that extend the hospitalization or utilize additional resources of care. Please utilize your independent clinical judgment when addressing the question(s) above.     This query and your response, once completed, will be entered into the legal medical record.    Sincerely,  Joan Blas RN  Clinical Documentation Integrity Program     "

## 2024-04-04 ENCOUNTER — HOME CARE VISIT (OUTPATIENT)
Dept: HOME HEALTH SERVICES | Facility: HOME HEALTHCARE | Age: 59
End: 2024-04-04
Payer: MEDICAID

## 2024-04-04 VITALS
SYSTOLIC BLOOD PRESSURE: 110 MMHG | DIASTOLIC BLOOD PRESSURE: 74 MMHG | OXYGEN SATURATION: 99 % | TEMPERATURE: 97.3 F | HEART RATE: 83 BPM

## 2024-04-04 NOTE — HOME HEALTH
"Patient went to the hospital from 3/21/24 to 3/26/24 due to fever, AMS, and a fall at home.  She was found to have infected right foot ulcer.  The hospital ruled out osteomyletis and she was referred to podiatry for treatment.  SN was intially involved at home but discharged her yesterday with no wound care orders and patient following up with podiatry.  She lives with her significant other (Dmitriy) in a small apartment.  She has as off loading shoe on the right and she states the podiatrist wants her to \"stay off her it as much as possible.\"  Therefore, PT limited at this time.      Assessment:  Patient wants therapy (\"anything I can do to make my legs stronger\") but only if the podiatrist allows less restrictions on her right LE.  If she is allowed to bear weight as tolerated, then some progressive intervention can be down but otherwise if may be an evaluation only. I will call the pateint after her podiatry appt on 4/4 to determine this.  The patient also complained of \"dizziness\" when she looks up and down, when she turns and when she gets up from the bed (her dizziness is very short in duration).  It has components of BPPV vertigo, but no nystagmus noted when having her do supine <> sit or rolling in the bed.  Tracking seemed intact.  She did state she has had multiple ear infections and has fluid in her ears at times and this could be the etiology of her dizziness.  Can't rule out neuropathy affecting the inner ear, cervicogenic or possibly vertebral artery occulsion causing this dizziness.      Plan for next visit (pending)  Progress standing balance exercises  Progress vestibular/oculomotor activities."

## 2024-04-08 NOTE — HOME HEALTH
Patient states she is completing dressing changes on her own and followinf up with podiatrist regularly and therfore does not need any further nursing visits.  No medication changes.  CP assessment WNL.  SN d/c.

## 2024-04-11 ENCOUNTER — HOME CARE VISIT (OUTPATIENT)
Dept: HOME HEALTH SERVICES | Facility: HOME HEALTHCARE | Age: 59
End: 2024-04-11
Payer: MEDICAID

## 2024-04-19 ENCOUNTER — HOME CARE VISIT (OUTPATIENT)
Dept: HOME HEALTH SERVICES | Facility: HOME HEALTHCARE | Age: 59
End: 2024-04-19
Payer: MEDICAID

## 2024-04-25 ENCOUNTER — HOME CARE VISIT (OUTPATIENT)
Dept: HOME HEALTH SERVICES | Facility: HOME HEALTHCARE | Age: 59
End: 2024-04-25
Payer: MEDICAID

## 2024-04-26 NOTE — HOME HEALTH
Patient wanted to hold further PT until her follow up with the podiatrist, but it got push back a couple of times and why she had missed visits.  She saw the podiatrist on 4/22 and per my conversation with the patient on 4/24, the podiatrist has further debrided her diabetic foot wound.  She states the podiatrist still does not want nursing for wound care and since she is still not suppose to stress her foot, PT not able to intervene either.  Will dc without a visit per patient request.

## 2024-08-12 ENCOUNTER — HOSPITAL ENCOUNTER (EMERGENCY)
Facility: HOSPITAL | Age: 59
Discharge: HOME | End: 2024-08-12
Attending: EMERGENCY MEDICINE
Payer: MEDICAID

## 2024-08-12 VITALS
WEIGHT: 195 LBS | SYSTOLIC BLOOD PRESSURE: 111 MMHG | TEMPERATURE: 98.2 F | DIASTOLIC BLOOD PRESSURE: 79 MMHG | OXYGEN SATURATION: 95 % | RESPIRATION RATE: 16 BRPM | HEIGHT: 64 IN | BODY MASS INDEX: 33.29 KG/M2 | HEART RATE: 111 BPM

## 2024-08-12 DIAGNOSIS — L02.419 AXILLARY ABSCESS: Primary | ICD-10-CM

## 2024-08-12 PROCEDURE — 99282 EMERGENCY DEPT VISIT SF MDM: CPT

## 2024-08-12 RX ORDER — SULFAMETHOXAZOLE AND TRIMETHOPRIM 800; 160 MG/1; MG/1
1 TABLET ORAL 2 TIMES DAILY
Qty: 14 TABLET | Refills: 0 | Status: SHIPPED | OUTPATIENT
Start: 2024-08-12

## 2024-08-12 RX ORDER — LIDOCAINE HYDROCHLORIDE AND EPINEPHRINE 10; 10 MG/ML; UG/ML
10 INJECTION, SOLUTION INFILTRATION; PERINEURAL ONCE
Status: COMPLETED | OUTPATIENT
Start: 2024-08-12 | End: 2024-08-12

## 2024-08-12 RX ADMIN — LIDOCAINE HYDROCHLORIDE AND EPINEPHRINE 10 ML: 10; 10 INJECTION, SOLUTION INFILTRATION; PERINEURAL at 17:29

## 2024-08-12 NOTE — ED PROVIDER NOTES
MD ATTESTATION NOTE    SHARED VISIT: This visit was performed by BOTH a physician and an APC. The substantive portion of the medical decision making was performed by this attesting physician who made or approved the management plan and takes responsibility for patient management. All studies in the APC note (if performed) were independently interpreted by me.     The GM and I have discussed this patient's history, physical exam, and treatment plan.  I have reviewed the documentation and affirm the documentation and agree with the treatment and plan.  The attached note describes my personal findings.      Independent Historians: Patient    A complete HPI/ROS/PMH/PSH/SH/FH are unobtainable due to: None    Chronic or social conditions impacting patient care (social determinants of health): None    Roxanna Beck is a 59 y.o. female who presents to the ED c/o acute abscess to right axilla. Pt shaved and had onset of erythema and induration with central pointing and some drainage. No fever, no n/v.          On exam:  GENERAL: pleasant cooperative well appearing f, alert, no acute distress  SKIN: Warm, dry, right axilla with area of indurationa dn erythema  HENT: Normocephalic, atraumatic  RESPIRATORY: relaxed breathing  NEURO: alert, moves all extremities, follows commands                                      Medical Decision Making:  ED Course as of 08/12/24 2053   Mon Aug 12, 2024   1734 Patient tolerated incision and drainage well.  I informed her that she will need to follow-up with her PCP in 2 days for removal of packing and follow-up on wound.  Patient will be started on broad-spectrum antibiotics given she is an insulin-dependent type 2 diabetic.  Patient was given return precautions for severe pain, severe bleeding, severe drainage or development of fever or chills.  Patient understands and agrees plan of care. [AH]      ED Course User Index  [AH] Arleen Gibson PA       MDM: Pt with localized abscess and no  systemic signs of illness. Plan I and D and local wound care.    Procedures:  Procedures        PPE: I followed hospital protocols for proper PPE based on patient presentation including use of N95 mask for suspected infectious respiratory conditions.  Proper hand hygiene was performed both before and after the patient encounter.          Diagnosis  Final diagnoses:   Axillary abscess       Note Disclaimer: At [facility], we believe that sharing information builds trust and better relationships. You are receiving this note because you recently visited [facility]. It is possible you will see health information before a provider has talked with you about it. This kind of information can be easy to misunderstand. To help you fully understand what it means for your health, we urge you to discuss this note with your provider.       Daysi Araujo MD  08/13/24 8033

## 2024-08-12 NOTE — ED PROVIDER NOTES
EMERGENCY DEPARTMENT ENCOUNTER  Room Number:  S04/04  PCP: Asia Roger PA  Independent Historians: Patient      HPI:  Chief Complaint: had concerns including Abscess.     A complete HPI/ROS/PMH/PSH/SH/FH are unobtainable due to: None    Chronic or social conditions impacting patient care (Social Determinants of Health): None      Context: Roxanna Beck is a 59 y.o. female with a medical history of dependent type 2 diabetes who presents to the ED c/o acute abscess of right axilla comps 5 days.  Patient states she does not normally shave her underarms but did shave her underarms approximately 6 to 7 days ago and started to notice an irritated area around the spot they continually got worse.  She states she feels a hardened area in her axilla that has become red and painful.  She denies any history of this in the past.  She is a insulin-dependent type 2 diabetic.  She admits to a history of wounds and sores on her feet but nothing in this area in the past.  She denies any history of known staph or MRSA infection.  Denies any fever or chills.  She denies any drug use.      Review of prior external notes (non-ED) -and- Review of prior external test results outside of this encounter:  Patient saw her PCP on 6/28/2024 for follow-up on her type 2 diabetes and refill of her gabapentin for neuropathy.  She did have a right toe ulcer at that time and was being followed up with podiatry.    Prescription drug monitoring program review:     N/A    PAST MEDICAL HISTORY  Active Ambulatory Problems     Diagnosis Date Noted    Lumbar disc herniation 12/15/2016    Migraine 12/29/2021    Hyperglycemia 02/23/2024    Diabetic ulcer of right great toe 02/23/2024    Diabetic peripheral neuropathy 12/23/2020    Generalized anxiety disorder 12/23/2020    Insulin dependent type 2 diabetes mellitus 11/24/2021    Migraine headache 12/23/2020    Hypertension 06/23/2021    Hyperglycemia due to diabetes mellitus 02/25/2024    Acute  metabolic encephalopathy 2024    Mixed hyperlipidemia 2024    GERD (gastroesophageal reflux disease) 2024    Nausea & vomiting 2024    Fever 2024    Hyponatremia 2024    Gram-negative bacteremia 2024    Sepsis due to Gram negative bacteria 2024     Resolved Ambulatory Problems     Diagnosis Date Noted    Lactic acidosis 2024     Past Medical History:   Diagnosis Date    Arthritis     Depression     Diabetes mellitus     Dizzy     Fibromyalgia     History of kidney stones     HNP (herniated nucleus pulposus)     Hyperlipidemia     Wears glasses          PAST SURGICAL HISTORY  Past Surgical History:   Procedure Laterality Date    BACK SURGERY      L4-5 Discectomy    CARPAL TUNNEL RELEASE      left     SECTION      CHOLECYSTECTOMY      COLONOSCOPY      ENDOSCOPY      HYSTERECTOMY      KIDNEY STONE SURGERY      LUMBAR DISCECTOMY N/A 12/15/2016    Procedure: LUMBAR DISCECTOMY L3-4;  Surgeon: Sandro Cuenca MD;  [facility]: [facility];  Service:     OOPHORECTOMY      TONSILLECTOMY      WISDOM TOOTH EXTRACTION           FAMILY HISTORY  Family History   Problem Relation Age of Onset    Lung cancer Mother     Cancer Mother     Heart attack Father     Heart disease Father     Diabetes Sister     Hypertension Sister     Breast cancer Sister 70    Diabetes Brother     Hypertension Brother     Heart disease Paternal Grandmother     Heart disease Paternal Grandfather     Breast cancer Cousin         early 50's    Breast cancer Maternal Aunt         50's    Breast cancer Maternal Aunt         50's    Breast cancer Maternal Aunt         50's    Breast cancer Maternal Aunt         50's    Breast cancer Maternal Aunt         50's    Endometrial cancer Neg Hx     Ovarian cancer Neg Hx          SOCIAL HISTORY  Social History     Socioeconomic History    Marital status: Single    Number of children: 1    Years of education: High School   Tobacco Use    Smoking status: Never     Smokeless tobacco: Never   Vaping Use    Vaping status: Never Used   Substance and Sexual Activity    Alcohol use: No    Drug use: No    Sexual activity: Defer         ALLERGIES  Codeine, Percocet [oxycodone-acetaminophen], Adhesive tape, and Toradol [ketorolac tromethamine]      REVIEW OF SYSTEMS  Review of Systems   Constitutional:  Negative for chills and fever.   Gastrointestinal:  Negative for nausea and vomiting.   Musculoskeletal:  Negative for back pain.   Skin:  Positive for wound.   Neurological:  Negative for numbness.     Included in HPI  All systems reviewed and negative except for those discussed in HPI.      PHYSICAL EXAM    I have reviewed the triage vital signs and nursing notes.    ED Triage Vitals   Temp Heart Rate Resp BP SpO2   08/12/24 1515 08/12/24 1515 08/12/24 1515 08/12/24 1551 08/12/24 1515   98.2 °F (36.8 °C) 111 16 111/79 95 %      Temp src Heart Rate Source Patient Position BP Location FiO2 (%)   08/12/24 1515 08/12/24 1515 -- -- --   Tympanic Monitor          Physical Exam  GENERAL: alert, no acute distress  SKIN: Warm, dry.  5 x 6 cm indurated and fluctuant area in right axilla with a purulent head.  Very tender to touch and erythematous.  Small drainage when compressed.  HENT: Normocephalic, atraumatic  EYES: no scleral icterus  CV: regular rhythm, regular rate  RESPIRATORY: normal effort, lungs clear  ABDOMEN: soft, nontender, nondistended  MUSCULOSKELETAL: no deformity  NEURO: alert, moves all extremities, follows commands        MEDICATIONS GIVEN IN ER  Medications   lidocaine 1% - EPINEPHrine 1:626651 (XYLOCAINE W/EPI) 1 %-1:000733 injection 10 mL (10 mL Injection Given by Other 8/12/24 5721)         ORDERS PLACED DURING THIS VISIT:  Orders Placed This Encounter   Procedures    Incision & Drainage    Incision & Drainage         OUTPATIENT MEDICATION MANAGEMENT:  No current Epic-ordered facility-administered medications on file.     Current Outpatient Medications Ordered in Epic    Medication Sig Dispense Refill    amitriptyline (ELAVIL) 50 MG tablet Take 1 tablet by mouth Every Night. 30 tablet 0    aspirin 81 MG chewable tablet Chew 81 mg Daily. Indications: Disease involving Lipid Deposits in the Arteries      baclofen (LIORESAL) 10 MG tablet Take 1 tablet by mouth 2 (Two) Times a Day. 60 tablet 0    cetirizine (ZyrTEC) 10 MG tablet Take 1 tablet by mouth Daily. Indications: Hayfever      DULoxetine (CYMBALTA) 60 MG capsule Take 1 capsule by mouth Daily. Indications: Generalized Anxiety Disorder, Major Depressive Disorder      gabapentin (NEURONTIN) 300 MG capsule Take 1 capsule by mouth At Night As Needed (For pain). Indications: Neuropathic Pain      glipiZIDE (GLUCOTROL) 10 MG tablet Take 1 tablet by mouth 2 (Two) Times a Day Before Meals. Indications: Type 2 Diabetes      hydroCHLOROthiazide 25 MG tablet Take 25 mg by mouth Daily. Indications: Edema      Insulin Glargine (Lantus SoloStar) 100 UNIT/ML injection pen Inject 50 Units under the skin into the appropriate area as directed Daily. Indications: Type 2 Diabetes      Insulin Lispro, 1 Unit Dial, (HumaLOG KwikPen) 100 UNIT/ML solution pen-injector Inject 5 Units under the skin into the appropriate area as directed 3 (Three) Times a Day With Meals (Patient taking differently: Inject 5 Units under the skin into the appropriate area as directed 3 (Three) Times a Day Before Meals.) 15 mL 0    Insulin Pen Needle (Pen Needles) 32G X 4 MM misc Inject 1 each under the skin into the appropriate area as directed 4 (Four) Times a Day As Needed (for insulin injections). Formulary Compliance Approval 100 each 12    lisinopril (PRINIVIL,ZESTRIL) 20 MG tablet Take 1 tablet by mouth Daily. Indications: High Blood Pressure Disorder      meloxicam (MOBIC) 15 MG tablet Take 15 mg by mouth Daily. Indications: Joint Damage causing Pain and Loss of Function      metFORMIN (GLUCOPHAGE) 1000 MG tablet Take 1 tablet by mouth 2 (Two) Times a Day.  Indications: Type 2 Diabetes      omeprazole (PriLOSEC) 40 MG capsule Take 1 capsule by mouth Daily. Indications: Heartburn      PARoxetine (PAXIL) 20 MG tablet Take 1 tablet by mouth Every Morning. Indications: Generalized Anxiety Disorder, Major Depressive Disorder      pioglitazone (ACTOS) 30 MG tablet Take 1 tablet by mouth Daily. Indications: Type 2 Diabetes      rosuvastatin (CRESTOR) 10 MG tablet Take 1 tablet by mouth Daily. Indications: Disease involving Lipid Deposits in the Arteries      sulfamethoxazole-trimethoprim (BACTRIM DS,SEPTRA DS) 800-160 MG per tablet Take 1 tablet by mouth 2 (Two) Times a Day. 14 tablet 0    SUMAtriptan (IMITREX) 50 MG tablet Take 1 tablet by mouth Every 2 (Two) Hours As Needed for Migraine. Take one tablet at onset of headache. May repeat dose one time in 2 hours if headache not relieved.  Indications: Migraine Headache           PROCEDURES  Incision & Drainage    Date/Time: 8/12/2024 5:32 PM    Performed by: Arleen Gibson PA  Authorized by: Daysi Araujo MD    Consent:     Consent obtained:  Verbal    Consent given by:  Patient    Risks, benefits, and alternatives were discussed: yes      Risks discussed:  Bleeding, incomplete drainage and infection    Alternatives discussed:  No treatment  Universal protocol:     Procedure explained and questions answered to patient or proxy's satisfaction: yes      Relevant documents present and verified: yes      Patient identity confirmed:  Verbally with patient  Location:     Type:  Abscess    Size:  6    Location:  Upper extremity    Upper extremity location: right axilla.  Pre-procedure details:     Skin preparation:  Povidone-iodine  Sedation:     Sedation type:  None  Anesthesia:     Anesthesia method:  Local infiltration    Local anesthetic:  Lidocaine 1% WITH epi  Procedure type:     Complexity:  Complex  Procedure details:     Incision types:  Stab incision    Incision depth:  Subcutaneous    Wound management:  Probed  and deloculated and irrigated with saline    Drainage:  Bloody and purulent    Drainage amount:  Copious    Wound treatment:  Wound left open    Packing materials:  1/2 in iodoform gauze  Post-procedure details:     Procedure completion:  Tolerated          PROGRESS, DATA ANALYSIS, CONSULTS, AND MEDICAL DECISION MAKING  Discussion below represents my analysis of pertinent findings related to patient's condition, differential diagnosis, treatment plan and final disposition.    Differential diagnosis includes but is not limited to abscess, cellulitis, folliculitis, ulcer, insect bite.    ED Course as of 08/12/24 1734   Mon Aug 12, 2024   1734 Patient tolerated incision and drainage well.  I informed her that she will need to follow-up with her PCP in 2 days for removal of packing and follow-up on wound.  Patient will be started on broad-spectrum antibiotics given she is an insulin-dependent type 2 diabetic.  Patient was given return precautions for severe pain, severe bleeding, severe drainage or development of fever or chills.  Patient understands and agrees plan of care. [AH]      ED Course User Index  [AH] Arleen Gibson PA           AS OF 17:34 EDT VITALS:    BP - 111/79  HR - 111  TEMP - 98.2 °F (36.8 °C) (Tympanic)  O2 SATS - 95%    COMPLEXITY OF CARE  Admission was considered but after careful review of the patient's presentation, physical examination, diagnostic results, and response to treatment the patient may be safely discharged with outpatient follow-up.      DIAGNOSIS  Final diagnoses:   Axillary abscess         DISPOSITION  ED Disposition       ED Disposition   Discharge    Condition   Stable    Comment   --                Please note that portions of this document were completed with a voice recognition program.    Note Disclaimer: At [facility], we believe that sharing information builds trust and better relationships. You are receiving this note because you recently visited [facility]. It is  possible you will see health information before a provider has talked with you about it. This kind of information can be easy to misunderstand. To help you fully understand what it means for your health, we urge you to discuss this note with your provider.         Arleen Gibson PA  08/12/24 1732

## 2024-08-12 NOTE — DISCHARGE INSTRUCTIONS
Although you are being discharged from the ED today, I encourage you to return for worsening symptoms. Things can, and do, change such that treatment at home with medication may not be adequate. Specifically I recommend returning for severe intractable pain, bleeding or drainage from wound, fever, chills or any other worsening or alarming symptoms.     Rest.  Keep wound clean with antibacterial soap daily.  After 48 hours remove packing from wound or have your PCP do so.  Follow up with primary care provider for further management and to have blood pressure rechecked.  Take your medications as prescribed.  Finish all antibiotics as prescribed.

## 2024-08-16 ENCOUNTER — HOSPITAL ENCOUNTER (EMERGENCY)
Facility: HOSPITAL | Age: 59
Discharge: HOME | End: 2024-08-16
Attending: EMERGENCY MEDICINE
Payer: MEDICAID

## 2024-08-16 VITALS
TEMPERATURE: 96.8 F | HEIGHT: 64 IN | HEART RATE: 87 BPM | OXYGEN SATURATION: 96 % | BODY MASS INDEX: 33.47 KG/M2 | SYSTOLIC BLOOD PRESSURE: 128 MMHG | RESPIRATION RATE: 16 BRPM | DIASTOLIC BLOOD PRESSURE: 80 MMHG

## 2024-08-16 DIAGNOSIS — L02.411 ABSCESS OF RIGHT AXILLA: Primary | ICD-10-CM

## 2024-08-16 PROCEDURE — 63710000001 ONDANSETRON ODT 4 MG TABLET DISPERSIBLE: Performed by: NURSE PRACTITIONER

## 2024-08-16 PROCEDURE — 99283 EMERGENCY DEPT VISIT LOW MDM: CPT

## 2024-08-16 RX ORDER — HYDROCODONE BITARTRATE AND ACETAMINOPHEN 5; 325 MG/1; MG/1
1 TABLET ORAL ONCE
Status: COMPLETED | OUTPATIENT
Start: 2024-08-16 | End: 2024-08-16

## 2024-08-16 RX ORDER — ONDANSETRON 4 MG/1
4 TABLET, ORALLY DISINTEGRATING ORAL ONCE
Status: COMPLETED | OUTPATIENT
Start: 2024-08-16 | End: 2024-08-16

## 2024-08-16 RX ADMIN — HYDROCODONE BITARTRATE AND ACETAMINOPHEN 1 TABLET: 5; 325 TABLET ORAL at 10:08

## 2024-08-16 RX ADMIN — ONDANSETRON 4 MG: 4 TABLET, ORALLY DISINTEGRATING ORAL at 10:08

## 2024-08-16 NOTE — ED NOTES
Pt has abscess under R arm. Pt had it drained and packed. Pt unpacking wound and pt she could not get all the dressing out.

## 2024-08-16 NOTE — ED PROVIDER NOTES
EMERGENCY DEPARTMENT ENCOUNTER  Room Number:  08/08  PCP: Asia Roger PA  Independent Historians: Patient      HPI:  Chief Complaint: had concerns including Dressing Change.     A complete HPI/ROS/PMH/PSH/SH/FH are unobtainable due to:     Chronic or social conditions impacting patient care (Social Determinants of Health):       Context: Roxanna Beck is a pleasant afebrile ambulatory 59 y.o. occasion female with a medical history of hypertension, anxiety, migraine and type 2 diabetes who presents to the ED c/o acute concern over possible retained packing materials and right axilla.    Patient was seen and evaluated in the emergency department 4 days ago and underwent incision and drainage with packing material placed in her right axilla.  She states in the shower she noticed the iodoform gauze had started to come out, she subsequently pulled on it and removed a piece of the packing material.  Patient states she was concerned that there may be retained portion of the iodoform gauze.  She states she is feeling well.  She is not having any fevers or chills.  She is compliant with her antibiotics.          Review of prior external notes (non-ED) -and- Review of prior external test results outside of this encounter:  Office visit 6/28/2024 seen by Asia Roger for type 2 diabetes, migraine.  Home medications include glipizide, metformin and Actos    Prescription drug monitoring program review:         PAST MEDICAL HISTORY  Active Ambulatory Problems     Diagnosis Date Noted    Lumbar disc herniation 12/15/2016    Migraine 12/29/2021    Hyperglycemia 02/23/2024    Diabetic ulcer of right great toe 02/23/2024    Diabetic peripheral neuropathy 12/23/2020    Generalized anxiety disorder 12/23/2020    Insulin dependent type 2 diabetes mellitus 11/24/2021    Migraine headache 12/23/2020    Hypertension 06/23/2021    Hyperglycemia due to diabetes mellitus 02/25/2024    Acute metabolic encephalopathy 03/21/2024     Mixed hyperlipidemia 2024    GERD (gastroesophageal reflux disease) 2024    Nausea & vomiting 2024    Fever 2024    Hyponatremia 2024    Gram-negative bacteremia 2024    Sepsis due to Gram negative bacteria 2024     Resolved Ambulatory Problems     Diagnosis Date Noted    Lactic acidosis 2024     Past Medical History:   Diagnosis Date    Arthritis     Depression     Diabetes mellitus     Dizzy     Fibromyalgia     History of kidney stones     HNP (herniated nucleus pulposus)     Hyperlipidemia     Wears glasses          PAST SURGICAL HISTORY  Past Surgical History:   Procedure Laterality Date    BACK SURGERY      L4-5 Discectomy    CARPAL TUNNEL RELEASE      left     SECTION      CHOLECYSTECTOMY      COLONOSCOPY      ENDOSCOPY      HYSTERECTOMY      KIDNEY STONE SURGERY      LUMBAR DISCECTOMY N/A 12/15/2016    Procedure: LUMBAR DISCECTOMY L3-4;  Surgeon: Sandro Cuenca MD;  Location: [facility];  Service:     OOPHORECTOMY      TONSILLECTOMY      WISDOM TOOTH EXTRACTION           FAMILY HISTORY  Family History   Problem Relation Age of Onset    Lung cancer Mother     Cancer Mother     Heart attack Father     Heart disease Father     Diabetes Sister     Hypertension Sister     Breast cancer Sister 70    Diabetes Brother     Hypertension Brother     Heart disease Paternal Grandmother     Heart disease Paternal Grandfather     Breast cancer Cousin         early 50's    Breast cancer Maternal Aunt         50's    Breast cancer Maternal Aunt         50's    Breast cancer Maternal Aunt         50's    Breast cancer Maternal Aunt         50's    Breast cancer Maternal Aunt         50's    Endometrial cancer Neg Hx     Ovarian cancer Neg Hx          SOCIAL HISTORY  Social History     Socioeconomic History    Marital status: Single    Number of children: 1    Years of education: High School   Tobacco Use    Smoking status: Never    Smokeless tobacco: Never   Vaping Use     Vaping status: Never Used   Substance and Sexual Activity    Alcohol use: No    Drug use: No    Sexual activity: Defer         ALLERGIES  Codeine, Percocet [oxycodone-acetaminophen], Adhesive tape, and Toradol [ketorolac tromethamine]      REVIEW OF SYSTEMS  Review of Systems  Included in HPI  All systems reviewed and negative except for those discussed in HPI.      PHYSICAL EXAM    I have reviewed the triage vital signs and nursing notes.    ED Triage Vitals   Temp Heart Rate Resp BP SpO2   08/16/24 0921 08/16/24 0923 08/16/24 0923 08/16/24 0924 08/16/24 0923   96.8 °F (36 °C) 90 18 122/71 97 %      Temp src Heart Rate Source Patient Position BP Location FiO2 (%)   08/16/24 0921 08/16/24 0923 08/16/24 0924 08/16/24 0924 --   Tympanic Monitor Sitting Right arm        Physical Exam  GENERAL: alert, no acute distress  SKIN: Warm, there is an approximate 3 x 4 cm area of mild induration consistent with abscess with granulation tissue surrounding incised area, no fluctuance present  HENT: Normocephalic, atraumatic  EYES: no scleral icterus  CV: regular rhythm, regular rate  RESPIRATORY: normal effort, lungs clear  ABDOMEN: soft, nontender, obese  Psychiatric: Bizarre affect, cooperative, nonsuicidal  MUSCULOSKELETAL: no deformity  NEURO: alert, moves all extremities, follows commands            LAB RESULTS  No results found for this or any previous visit (from the past 24 hour(s)).      RADIOLOGY  No Radiology Exams Resulted Within Past 24 Hours      MEDICATIONS GIVEN IN ER  Medications   HYDROcodone-acetaminophen (NORCO) 5-325 MG per tablet 1 tablet (1 tablet Oral Given 8/16/24 1008)   ondansetron ODT (ZOFRAN-ODT) disintegrating tablet 4 mg (4 mg Oral Given 8/16/24 1008)         ORDERS PLACED DURING THIS VISIT:  Orders Placed This Encounter   Procedures    Ambulatory Referral to Wound Clinic         OUTPATIENT MEDICATION MANAGEMENT:  No current Epic-ordered facility-administered medications on file.     Current  Outpatient Medications Ordered in Epic   Medication Sig Dispense Refill    amitriptyline (ELAVIL) 50 MG tablet Take 1 tablet by mouth Every Night. 30 tablet 0    aspirin 81 MG chewable tablet Chew 81 mg Daily. Indications: Disease involving Lipid Deposits in the Arteries      baclofen (LIORESAL) 10 MG tablet Take 1 tablet by mouth 2 (Two) Times a Day. 60 tablet 0    cetirizine (ZyrTEC) 10 MG tablet Take 1 tablet by mouth Daily. Indications: Hayfever      DULoxetine (CYMBALTA) 60 MG capsule Take 1 capsule by mouth Daily. Indications: Generalized Anxiety Disorder, Major Depressive Disorder      gabapentin (NEURONTIN) 300 MG capsule Take 1 capsule by mouth At Night As Needed (For pain). Indications: Neuropathic Pain      glipiZIDE (GLUCOTROL) 10 MG tablet Take 1 tablet by mouth 2 (Two) Times a Day Before Meals. Indications: Type 2 Diabetes      hydroCHLOROthiazide 25 MG tablet Take 25 mg by mouth Daily. Indications: Edema      Insulin Glargine (Lantus SoloStar) 100 UNIT/ML injection pen Inject 50 Units under the skin into the appropriate area as directed Daily. Indications: Type 2 Diabetes      Insulin Lispro, 1 Unit Dial, (HumaLOG KwikPen) 100 UNIT/ML solution pen-injector Inject 5 Units under the skin into the appropriate area as directed 3 (Three) Times a Day With Meals (Patient taking differently: Inject 5 Units under the skin into the appropriate area as directed 3 (Three) Times a Day Before Meals.) 15 mL 0    Insulin Pen Needle (Pen Needles) 32G X 4 MM misc Inject 1 each under the skin into the appropriate area as directed 4 (Four) Times a Day As Needed (for insulin injections). Formulary Compliance Approval 100 each 12    lisinopril (PRINIVIL,ZESTRIL) 20 MG tablet Take 1 tablet by mouth Daily. Indications: High Blood Pressure Disorder      meloxicam (MOBIC) 15 MG tablet Take 15 mg by mouth Daily. Indications: Joint Damage causing Pain and Loss of Function      metFORMIN (GLUCOPHAGE) 1000 MG tablet Take 1 tablet  by mouth 2 (Two) Times a Day. Indications: Type 2 Diabetes      omeprazole (PriLOSEC) 40 MG capsule Take 1 capsule by mouth Daily. Indications: Heartburn      PARoxetine (PAXIL) 20 MG tablet Take 1 tablet by mouth Every Morning. Indications: Generalized Anxiety Disorder, Major Depressive Disorder      pioglitazone (ACTOS) 30 MG tablet Take 1 tablet by mouth Daily. Indications: Type 2 Diabetes      rosuvastatin (CRESTOR) 10 MG tablet Take 1 tablet by mouth Daily. Indications: Disease involving Lipid Deposits in the Arteries      sulfamethoxazole-trimethoprim (BACTRIM DS,SEPTRA DS) 800-160 MG per tablet Take 1 tablet by mouth 2 (Two) Times a Day. 14 tablet 0    SUMAtriptan (IMITREX) 50 MG tablet Take 1 tablet by mouth Every 2 (Two) Hours As Needed for Migraine. Take one tablet at onset of headache. May repeat dose one time in 2 hours if headache not relieved.  Indications: Migraine Headache           PROCEDURES  Procedures            PROGRESS, DATA ANALYSIS, CONSULTS, AND MEDICAL DECISION MAKING  All labs have been independently interpreted by me.  All radiology studies have been reviewed by me. All EKG's have been independently viewed and interpreted by me.  Discussion below represents my analysis of pertinent findings related to patient's condition, differential diagnosis, treatment plan and final disposition.    Differential diagnosis includes but is not limited to retained foreign body, abscess, worried well.      MDM: Patient here with concern of possible retained iodoform gauze.  I have irrigated the area as well as viewed with nasal speculum and suction and do not appreciate any retained iodoform gauze.  I suspect the entirety of it has been removed.  The area seems to be improving well, will apply clean dressing and refer her to the wound care clinic.  Given that she is a diabetic I think it is a good idea to have this area rechecked in the short-term but my suspicion for retained iodoform gauze is very low                                AS OF 10:37 EDT VITALS:    BP - 122/71  HR - 90  TEMP - 96.8 °F (36 °C) (Tympanic)  O2 SATS - 97%    COMPLEXITY OF CARE  Admission was considered but after careful review of the patient's presentation, physical examination, diagnostic results, and response to treatment the patient may be safely discharged with outpatient follow-up.      DIAGNOSIS  Final diagnoses:   Abscess of right axilla         DISPOSITION  ED Disposition       ED Disposition   Discharge    Condition   Stable    Comment   --                Please note that portions of this document were completed with a voice recognition program.    Note Disclaimer: At [facility], we believe that sharing information builds trust and better relationships. You are receiving this note because you recently visited [facility]. It is possible you will see health information before a provider has talked with you about it. This kind of information can be easy to misunderstand. To help you fully understand what it means for your health, we urge you to discuss this note with your provider.         Cailin Caldwell APRN  08/16/24 1037

## 2024-08-17 NOTE — ED PROVIDER NOTES
MD ATTESTATION NOTE    SHARED VISIT: This visit was performed by BOTH a physician and an APC. The substantive portion of the medical decision making was performed by this attesting physician who made or approved the management plan and takes responsibility for patient management. All studies documented in the APC note (if performed) were independently interpreted by me.    The MG and I have discussed this patient's history, physical exam, MDM, and treatment plan.  I have reviewed the documentation and personally had a face to face interaction with the patient. The attached note describes my personal findings.      Roxanna Beck is a 59 y.o. female who presents to the ED c/o acute concern for retained foreign body to the right axillary incision from abscess drainage.  She states that the packing fell out and she is concerned that there is a retained foreign body, specifically retained packing material.    On exam:  GENERAL: not distressed  HENT: nares patent  EYES: no scleral icterus  CV: regular rhythm, regular rate  RESPIRATORY: normal effort  MUSCULOSKELETAL: no deformity  NEURO: alert, moves all extremities, follows commands  SKIN: warm, dry, incision to the right axillary wound has been explored myself.  I see no evidence of foreign body.  However, there is induration to the skin at the inferior margin of the incision.  No remaining fluctuance.    Labs  No results found for this or any previous visit (from the past 24 hour(s)).    Radiology  No Radiology Exams Resulted Within Past 24 Hours    Medications given in the ED:  Medications   HYDROcodone-acetaminophen (NORCO) 5-325 MG per tablet 1 tablet (1 tablet Oral Given 8/16/24 1008)   ondansetron ODT (ZOFRAN-ODT) disintegrating tablet 4 mg (4 mg Oral Given 8/16/24 1008)       Orders placed during this visit:  Orders Placed This Encounter   Procedures    Ambulatory Referral to Wound Clinic       Medical Decision Making:       Differential diagnosis:  Foreign  body, induration, recurrent abscess formation    Diagnosis  Final diagnoses:   Abscess of right axilla          London Knight II, MD  08/17/24 5332